# Patient Record
Sex: FEMALE | Race: WHITE | NOT HISPANIC OR LATINO | Employment: FULL TIME | URBAN - METROPOLITAN AREA
[De-identification: names, ages, dates, MRNs, and addresses within clinical notes are randomized per-mention and may not be internally consistent; named-entity substitution may affect disease eponyms.]

---

## 2017-04-03 RX ORDER — TAMOXIFEN CITRATE 20 MG/1
TABLET ORAL
Qty: 30 TABLET | Refills: 0 | OUTPATIENT
Start: 2017-04-03 | End: 2017-05-09 | Stop reason: SDUPTHER

## 2017-04-03 NOTE — TELEPHONE ENCOUNTER
----- Message from Arlet Smith sent at 4/3/2017  9:48 AM EDT -----  Contact: Mamta Johns [phone]   Want to clarify tamoxiphen.      Maryse with [facility] called wanting a refill on pt's tamoxifen. She has sent the request a few times. Since pt will see Dr. Lyon at the end of the month, OK'd a 30 day supply. She v/u.

## 2017-04-28 ENCOUNTER — OFFICE VISIT (OUTPATIENT)
Dept: ONCOLOGY | Facility: CLINIC | Age: 42
End: 2017-04-28

## 2017-04-28 ENCOUNTER — LAB (OUTPATIENT)
Dept: LAB | Facility: HOSPITAL | Age: 42
End: 2017-04-28

## 2017-04-28 VITALS
RESPIRATION RATE: 18 BRPM | HEART RATE: 116 BPM | OXYGEN SATURATION: 97 % | SYSTOLIC BLOOD PRESSURE: 140 MMHG | TEMPERATURE: 98.3 F | HEIGHT: 65 IN | BODY MASS INDEX: 47.32 KG/M2 | WEIGHT: 284 LBS | DIASTOLIC BLOOD PRESSURE: 90 MMHG

## 2017-04-28 DIAGNOSIS — I89.0 LYMPHEDEMA OF UPPER EXTREMITY: ICD-10-CM

## 2017-04-28 DIAGNOSIS — E55.9 VITAMIN D DEFICIENCY: ICD-10-CM

## 2017-04-28 DIAGNOSIS — D50.8 OTHER IRON DEFICIENCY ANEMIA: ICD-10-CM

## 2017-04-28 DIAGNOSIS — C50.812 MALIGNANT NEOPLASM OF OVERLAPPING SITES OF LEFT FEMALE BREAST (HCC): ICD-10-CM

## 2017-04-28 DIAGNOSIS — D51.8 OTHER VITAMIN B12 DEFICIENCY ANEMIA: ICD-10-CM

## 2017-04-28 DIAGNOSIS — M25.50 ARTHRALGIA OF MULTIPLE SITES: ICD-10-CM

## 2017-04-28 DIAGNOSIS — D51.8 OTHER VITAMIN B12 DEFICIENCY ANEMIA: Primary | ICD-10-CM

## 2017-04-28 DIAGNOSIS — D50.0 ANEMIA DUE TO BLOOD LOSS: ICD-10-CM

## 2017-04-28 DIAGNOSIS — F32.89 OTHER DEPRESSION: ICD-10-CM

## 2017-04-28 PROBLEM — R53.82 CHRONIC FATIGUE: Status: ACTIVE | Noted: 2017-04-28

## 2017-04-28 LAB
25(OH)D3 SERPL-MCNC: 29.8 NG/ML (ref 30–100)
ALBUMIN SERPL-MCNC: 4.2 G/DL (ref 3.5–5.2)
ALBUMIN/GLOB SERPL: 1.4 G/DL (ref 1.1–2.4)
ALP SERPL-CCNC: 62 U/L (ref 38–116)
ALT SERPL W P-5'-P-CCNC: 39 U/L (ref 0–33)
ANION GAP SERPL CALCULATED.3IONS-SCNC: 17.4 MMOL/L
AST SERPL-CCNC: 47 U/L (ref 0–32)
BASOPHILS # BLD AUTO: 0.05 10*3/MM3 (ref 0–0.1)
BASOPHILS NFR BLD AUTO: 0.5 % (ref 0–1.1)
BILIRUB SERPL-MCNC: <0.2 MG/DL (ref 0.1–1.2)
BUN BLD-MCNC: 10 MG/DL (ref 6–20)
BUN/CREAT SERPL: 16.4 (ref 7.3–30)
CALCIUM SPEC-SCNC: 9.2 MG/DL (ref 8.5–10.2)
CHLORIDE SERPL-SCNC: 100 MMOL/L (ref 98–107)
CHROMATIN AB SERPL-ACNC: 16.4 IU/ML (ref 0–14)
CO2 SERPL-SCNC: 24.6 MMOL/L (ref 22–29)
CREAT BLD-MCNC: 0.61 MG/DL (ref 0.6–1.1)
CRP SERPL-MCNC: 0.48 MG/DL (ref 0–0.5)
DEPRECATED RDW RBC AUTO: 46.1 FL (ref 37–49)
EOSINOPHIL # BLD AUTO: 0.99 10*3/MM3 (ref 0–0.36)
EOSINOPHIL NFR BLD AUTO: 9.6 % (ref 1–5)
ERYTHROCYTE [DISTWIDTH] IN BLOOD BY AUTOMATED COUNT: 13.6 % (ref 11.7–14.5)
ERYTHROCYTE [SEDIMENTATION RATE] IN BLOOD: 16 MM/HR (ref 0–20)
FERRITIN SERPL-MCNC: 42.3 NG/ML (ref 11–207)
GFR SERPL CREATININE-BSD FRML MDRD: 107 ML/MIN/1.73
GLOBULIN UR ELPH-MCNC: 3 GM/DL (ref 1.8–3.5)
GLUCOSE BLD-MCNC: 135 MG/DL (ref 74–124)
HCT VFR BLD AUTO: 35.4 % (ref 34–45)
HGB BLD-MCNC: 11.4 G/DL (ref 11.5–14.9)
IMM GRANULOCYTES # BLD: 0.07 10*3/MM3 (ref 0–0.03)
IMM GRANULOCYTES NFR BLD: 0.7 % (ref 0–0.5)
IRON 24H UR-MRATE: 36 MCG/DL (ref 37–145)
IRON SATN MFR SERPL: 7 % (ref 14–48)
LYMPHOCYTES # BLD AUTO: 2.2 10*3/MM3 (ref 1–3.5)
LYMPHOCYTES NFR BLD AUTO: 21.4 % (ref 20–49)
MCH RBC QN AUTO: 29.7 PG (ref 27–33)
MCHC RBC AUTO-ENTMCNC: 32.2 G/DL (ref 32–35)
MCV RBC AUTO: 92.2 FL (ref 83–97)
MONOCYTES # BLD AUTO: 0.41 10*3/MM3 (ref 0.25–0.8)
MONOCYTES NFR BLD AUTO: 4 % (ref 4–12)
NEUTROPHILS # BLD AUTO: 6.57 10*3/MM3 (ref 1.5–7)
NEUTROPHILS NFR BLD AUTO: 63.8 % (ref 39–75)
NRBC BLD MANUAL-RTO: 0 /100 WBC (ref 0–0)
PLATELET # BLD AUTO: 427 10*3/MM3 (ref 150–375)
PMV BLD AUTO: 9.3 FL (ref 8.9–12.1)
POTASSIUM BLD-SCNC: 3.7 MMOL/L (ref 3.5–4.7)
PROT SERPL-MCNC: 7.2 G/DL (ref 6.3–8)
RBC # BLD AUTO: 3.84 10*6/MM3 (ref 3.9–5)
SODIUM BLD-SCNC: 142 MMOL/L (ref 134–145)
TIBC SERPL-MCNC: 491 MCG/DL (ref 249–505)
TRANSFERRIN SERPL-MCNC: 351 MG/DL (ref 200–360)
VIT B12 BLD-MCNC: 370 PG/ML (ref 250–999)
WBC NRBC COR # BLD: 10.29 10*3/MM3 (ref 4–10)

## 2017-04-28 PROCEDURE — 99214 OFFICE O/P EST MOD 30 MIN: CPT | Performed by: INTERNAL MEDICINE

## 2017-04-28 PROCEDURE — 80053 COMPREHEN METABOLIC PANEL: CPT

## 2017-04-28 PROCEDURE — 86431 RHEUMATOID FACTOR QUANT: CPT | Performed by: INTERNAL MEDICINE

## 2017-04-28 PROCEDURE — 85652 RBC SED RATE AUTOMATED: CPT | Performed by: INTERNAL MEDICINE

## 2017-04-28 PROCEDURE — 83540 ASSAY OF IRON: CPT

## 2017-04-28 PROCEDURE — 84466 ASSAY OF TRANSFERRIN: CPT

## 2017-04-28 PROCEDURE — 86140 C-REACTIVE PROTEIN: CPT | Performed by: INTERNAL MEDICINE

## 2017-04-28 PROCEDURE — 36415 COLL VENOUS BLD VENIPUNCTURE: CPT

## 2017-04-28 PROCEDURE — 85025 COMPLETE CBC W/AUTO DIFF WBC: CPT

## 2017-04-28 PROCEDURE — 82728 ASSAY OF FERRITIN: CPT

## 2017-04-28 PROCEDURE — 82306 VITAMIN D 25 HYDROXY: CPT | Performed by: INTERNAL MEDICINE

## 2017-04-28 PROCEDURE — 82607 VITAMIN B-12: CPT

## 2017-04-28 RX ORDER — NEBIVOLOL HYDROCHLORIDE 10 MG/1
TABLET ORAL
COMMUNITY
Start: 2017-03-03 | End: 2017-11-08

## 2017-04-28 RX ORDER — METOPROLOL SUCCINATE 50 MG/1
50 TABLET, EXTENDED RELEASE ORAL NIGHTLY
COMMUNITY
Start: 2017-04-12 | End: 2020-06-26 | Stop reason: SDDI

## 2017-04-28 RX ORDER — LOSARTAN POTASSIUM AND HYDROCHLOROTHIAZIDE 25; 100 MG/1; MG/1
1 TABLET ORAL NIGHTLY
COMMUNITY
Start: 2017-04-25 | End: 2020-06-26 | Stop reason: SDDI

## 2017-04-28 RX ORDER — FLUTICASONE PROPIONATE 50 MCG
1 SPRAY, SUSPENSION (ML) NASAL AS NEEDED
COMMUNITY
Start: 2017-04-12 | End: 2019-10-25

## 2017-04-28 RX ORDER — MELOXICAM 15 MG/1
15 TABLET ORAL DAILY
COMMUNITY
Start: 2017-04-12 | End: 2018-06-04

## 2017-04-28 NOTE — PROGRESS NOTES
Subjective .     REASONS FOR FOLLOWUP:    1. Stage IIA ([id number]) left breast cancer:  Status post left mastectomy with left axillary lymph node dissection 03/12/2013.  Lesion #1 invasive ductal carcinoma grade 2, 1.5 cm ER positive (90%), DC positive (1-4%), HER-2/jodi FISH negative (ratio 1.4),  Ki-67 score of 8%.  Lesion #2 invasive ductal carcinoma, grade 2, 1.7 cm,  ER positive (70%), DC negative (%),HER-2/jodi FISH positive (ratio of 3.0).  3 of 15 lymph nodes positive (2 macrometastases, 1 micrometastasis, largest lesion 2.5 cm replacing lymph node).  Lymph node evaluated and is ER positive (90%), DC positive (30%), HER-2/jodi FISH negative (ratio of 1.4).    2. Iron deficiency anemia presumed secondary to menstrual blood loss.  Previously treated with iron sulfate 325 mg daily.  Iron discontinued secondary to GI side effects (diarrhea).  3. BRCA-1 and 2 testing negative.    4. Participation in the [employer] Clinical trial.   5. Status post Mediport placement by Dr. Neumann 04/08/2013.  Mediport removed by Dr. Mabry 07/21/2014.  6. Initiation of adjuvant chemotherapy 04/16/2013.  Treatment planned:  dose-dense Adriamycin and Cytoxan with Neulasta support q.2 weeks x4 cycles.  Subsequent weekly Taxol and Herceptin x12 weeks.  Subsequent continuation of Herceptin q.3 weeks to complete one year.  Adjuvant endocrine therapy after completion of chemotherapy with tamoxifen (premenopausal).  Plan for adjuvant radiation therapy following completion of chemotherapy.  Last dose of Taxol administered 08/27/2013. Tamoxifen initiated 09/25/2013 (plan to treat for 10 years).   Final dose of Herceptin administered 05/21/2014 (one year of treatment completed 06/11/2014).   7. 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  8. Borderline elevated transaminases. CT abdomen 10/30/2013 with normal appearing liver. Suspected steatohepatitis.   9. Left foot plantar fasciitis with stress fracture of  the 4th metatarsal, status post evaluation with bone scan, 09/29/2014 and MRI of the left foot, 10/08/2014. Negative for metastatic disease.    10. Lymphedema of the left upper extremity with compression sleeve in place and having been evaluated in the lymphedema clinic.   11. Left wrist tenosynovitis identified on MRI of 06/18/2015. Status post steroid injection by orthopedics, on 07/18/2015.   12. Anemia with hemoglobin decreased to 10.8 on 3/30/16.  Evaluation revealing evidence of iron deficiency (related to menstrual blood loss) and B12 deficiency.  Initiated oral iron sulfate 325 mg twice a day as well as B12 replacement with weekly injections ×4 and subsequent monthly injections with transition to oral B12 1000 µg daily.   13. Vitamin D deficiency continuing on 2000 unit per day replacement     HISTORY OF PRESENT ILLNESS:  The patient is a 41 y.o. year old female who is here for follow-up with the above-mentioned history.    History of Present Illness   the patient returns today in follow-up continuing on adjuvant tamoxifen.  She continues to tolerate this very well.  She is not experiencing any significant hot flashes.  She had previous difficulty with dysfunctional uterine bleeding.  She did undergo a endometrial biopsy 1/18/16 that was negative.  She had fairly profuse bleeding that occurred in early March 2016.  Eventually this did cease.  She did undergo a DandC on 4/13/16 with no evidence of malignancy on the pathology.       The patient returns in follow-up today with a number of complaints.  I had asked her to remain on oral iron twice daily as well as oral B12 at her last visit however she has not been receiving either of these.  She is receiving calcium and vitamin D.  She does have quite a technically schedule working around an hour away from her home and caring for her children.  She is fatigued and notes that this has increased over the past 4 months or so.  She has had worsening difficulty with  arthralgias involving her hands right greater than left.  She has pain involving her entire body.  She has been evaluated by her primary care nurse practitioner in reports that an MARYSE panel was performed that was negative.  She felt that she may have carpal tunnel syndrome and she has been referred to orthopedics for evaluation.  It is also felt that she may have fibromyalgia.  She had continued without evidence of any menstrual blood loss until a few weeks ago when she had a very heavy menstrual cycle and she has been more fatigued since that time.  She does note some diarrhea intermittently recently denies any bright blood per rectum or melena.  She has tried to pursue weight loss however has been unsuccessful.  She did take a brief course of Saxenda but was taken off with no effects.  She did follow-up with her gynecologist in January and they had discussed the potential hysterectomy however as it would be performed vaginally with laparoscopic assistance, it was recommended for her to first lose weight.  She is also wanting to pursue at some point reconstruction of the left breast however plastic surgery has requested that she lose weight first as well.  She reports that from a psychiatric standpoint she has been doing well.    PAST MEDICAL HISTORY:    1. Hypertension.  2. History of menorrhagia status post [facility] with Dr. Elda Roland 16.  Pathology showed polypoid endometrium with disordered proliferation but no evidence of malignancy.  Menstrual blood loss ceased.  3. History of phentermine use for weight loss.  4. Status post knee surgery after traumatic fracture with hardware in place in .  5. Status post  x2.  6. Status post Mediport placement by Dr. Neumann on 2013. Mediport removed by Dr. Mabry 2014.  7. Left foot plantar fasciitis with stress fracture of the left 4th metatarsal, 2014.   8. Osteopenia.   9. Borderline vitamin D deficiency.   10. Anxiety/depression.   Hospitalization required in 10/2015 due to major depressive disorder with psychotic features.   11. Anemia secondary to iron deficiency (menstrual blood loss) and B12 deficiency.    OB-GYN HISTORY:   G3, P3, A0, menarche age 10.  The patient has had regular menstrual cycles, however, has some degree of menorrhagia.  She was on oral contraceptives for 3-4 years in her late teens to early 20s. Menstrual cycles ceased with the 1st cycle of chemotherapy.     Menstrual cycles resumed in July 2015. Endometrial biopsy performed due to endometrial thickening in October 2015 and again 1/18/16 with benign pathology.  Recurrent severe dysfunctional uterine bleeding in early March 2016 causing anemia.  D&C 4/13/16-  pathology showed polypoid endometrium with disordered proliferation but no evidence of malignancy.     ONCOLOGIC HISTORY:  (History from previous dates can be found in the separate document.)    Current Outpatient Prescriptions on File Prior to Visit   Medication Sig Dispense Refill   • cholecalciferol (VITAMIN D3) 1000 UNITS tablet Take 1,000 Units by mouth daily.     • escitalopram (LEXAPRO) 20 MG tablet Take 20 mg by mouth daily.     • ferrous sulfate 325 (65 FE) MG tablet Take 325 mg by mouth 2 (two) times a day.     • LISINOPRIL PO Take 20 mg by mouth Daily.     • Loratadine 10 MG capsule Take 10 mg by mouth Daily.     • LORazepam (ATIVAN) 1 MG tablet Take 1 mg by mouth 3 (three) times a day.     • tamoxifen (NOLVADEX) 20 MG chemo tablet TAKE ONE TABLET BY MOUTH DAILY 30 tablet 0   • [DISCONTINUED] azithromycin (ZITHROMAX) 250 MG tablet Take 2 tablets the first day, then 1 tablet daily for 4 days. 6 tablet 0   • [DISCONTINUED] Cyanocobalamin (VITAMIN B-12 IJ) Inject  as directed 1 (one) time per week.     • [DISCONTINUED] verapamil SR (CALAN-SR) 240 MG CR tablet Take 240 mg by mouth every morning.       Current Facility-Administered Medications on File Prior to Visit   Medication Dose Route Frequency Provider  Last Rate Last Dose   • cyanocobalamin injection 1,000 mcg  1,000 mcg Intramuscular Once Glenn Lyon Jr., MD           ALLERGIES:     Allergies   Allergen Reactions   • Adhesive Tape    • Shellfish-Derived Products GI Intolerance   • Shrimp Flavor GI Intolerance       SOCIAL HISTORY:  The patient is  and lives with her  in [place], [place].  She works as a teacher.  She denies history of smoking, denies significant alcohol use.      FAMILY HISTORY:  Maternal grandmother had breast cancer in her 70s; maternal grandmother’s sister had ovarian cancer in her 70s, and another sister had non-Hodgkin's lymphoma in her 70s.  Maternal grandfather had prostate cancer late in life, and his sister had ovarian cancer later in life.  On paternal side there is a history of diabetes mellitus.  A sister has had issues with anemia and easy bruising.  The patient was seen in [facility] and underwent BRCA-1 and 2 testing prior to surgery which returned negative.          Review of Systems   Constitutional: Positive for fatigue. Negative for activity change, appetite change, fever and unexpected weight change.   HENT: Negative for congestion, mouth sores, nosebleeds and voice change.    Respiratory: Negative for cough and shortness of breath.    Cardiovascular: Negative for chest pain, palpitations and leg swelling.   Gastrointestinal: Negative for abdominal distention, abdominal pain, blood in stool, constipation, diarrhea, nausea and vomiting.   Endocrine: Negative for cold intolerance and heat intolerance.   Genitourinary: Positive for menstrual problem. Negative for difficulty urinating, dysuria, frequency and hematuria.   Musculoskeletal: Positive for arthralgias. Negative for back pain, joint swelling and myalgias.   Skin: Negative for rash.   Neurological: Negative for dizziness, syncope, weakness, light-headedness, numbness and headaches.   Hematological: Negative for adenopathy. Does not bruise/bleed easily.    Psychiatric/Behavioral: Negative for confusion, dysphoric mood and sleep disturbance. The patient is not nervous/anxious.          Objective      Vitals:    04/28/17 1506   BP: 140/90   Pulse: 116   Resp: 18   Temp: 98.3 °F (36.8 °C)   SpO2: 97%      Current Status 4/28/2017   ECOG score 0   Pain 5/10    Physical Exam   Constitutional: She is oriented to person, place, and time. She appears well-developed and well-nourished.   HENT:   Mouth/Throat: Oropharynx is clear and moist.   Eyes: Conjunctivae are normal.   Neck: No thyromegaly present.   Cardiovascular: Normal rate and regular rhythm.  Exam reveals no gallop and no friction rub.    No murmur heard.  Pulmonary/Chest: Effort normal and breath sounds normal. No respiratory distress. She has no wheezes. Right breast exhibits no mass. Left breast exhibits no mass (status post left mastectomy with postoperative/post radiation changes noted.).   Abdominal: Soft. Bowel sounds are normal. She exhibits no distension. There is no tenderness.   Musculoskeletal: She exhibits no edema.   Lymphadenopathy:        Head (right side): No submandibular adenopathy present.     She has no cervical adenopathy.     She has no axillary adenopathy.        Right: No inguinal and no supraclavicular adenopathy present.        Left: No inguinal and no supraclavicular adenopathy present.   Neurological: She is alert and oriented to person, place, and time. She displays normal reflexes. No cranial nerve deficit. She exhibits normal muscle tone.   Skin: Skin is warm and dry. No rash noted.   Psychiatric: She has a normal mood and affect. Her behavior is normal.       RECENT LABS:  Hematology WBC   Date Value Ref Range Status   04/28/2017 10.29 (H) 4.00 - 10.00 10*3/mm3 Final     RBC   Date Value Ref Range Status   04/28/2017 3.84 (L) 3.90 - 5.00 10*6/mm3 Final     Hemoglobin   Date Value Ref Range Status   04/28/2017 11.4 (L) 11.5 - 14.9 g/dL Final     Hematocrit   Date Value Ref Range  Status   04/28/2017 35.4 34.0 - 45.0 % Final     Platelets   Date Value Ref Range Status   04/28/2017 427 (H) 150 - 375 10*3/mm3 Final        Lab Results   Component Value Date    GLUCOSE 135 (H) 04/28/2017    BUN 10 04/28/2017    CREATININE 0.61 04/28/2017    EGFRIFNONA 107 04/28/2017    BCR 16.4 04/28/2017    CO2 24.6 04/28/2017    CALCIUM 9.2 04/28/2017    ALBUMIN 4.20 04/28/2017    LABIL2 1.4 04/28/2017    AST 47 (H) 04/28/2017    ALT 39 (H) 04/28/2017     Additional labs today: Iron 36, ferritin 42, iron saturation 7%, TIBC 491, B12 370      Assessment/Plan     1. Stage IIA ([id number]) left breast cancer:   The patient had 2 primary lesions of the left breast, the first ER positive, SD borderline, HER-2/jodi negative. The second was ER positive, SD negative, HER-2/jodi positive. She had 3 of 15 lymph nodes positive. She received adjuvant dose-dense chemotherapy with AC x4 cycles followed by weekly Taxol x12 doses completed on 08/27/2013. She did receive Herceptin in conjunction with the Taxol and has continued on Herceptin now every 3 weeks to complete a total of a year.  One year of Herceptin has now been completed. Her last dose of Herceptin was received on 05/21/2014 (3-week dose). She did initiate adjuvant tamoxifen on 09/25/2013 and we anticipate likely a 10-year course of treatment.     The patient continues with no clinical evidence of recurrent disease.  I believe that her musculoskeletal complaints are unrelated to her history of breast cancer at this point.  Her exam today was negative.  She did undergo her annual mammogram on 10/13/16 which was negative, BI-RADS 1.  She will continue on adjuvant tamoxifen and I will see her back in 3 months for follow-up visit.    2. Depression/anxiety with prior psychotic episode: The patient required hospitalization in the behavioral health unit in October 2015. She has had difficulties with anxiety and depression in the past, however, given her breast cancer  diagnosis as well as stressors from her job and family, it appears that these precipitated the event.  She has stabilized on Lexapro at 20 mg per day and does have routine follow-up scheduled with Dr. Camp.   3. Left wrist tenosynovitis: This improved.  4. Diffuse arthralgias and fatigue: The patient is experiencing diffuse arthralgias as well as pain in her wrists right greater than left.  It is unclear whether she has some recurrence of tenosynovitis on the right side.  She reports that her primary care nurse practitioner performed a serologic evaluation that was negative however we will go ahead and check a rheumatoid factor and an MARYSE panel along with sedimentation rate and CRP today.  There is been some suspicion for carpal tunnel syndrome on the right side and she has been referred to orthopedics.  There is also been some suspicion for fibromyalgia and I share that concern. Fibromyalgia seems to be the most likely cause of her current generalized symptoms.  5. Vitamin D deficiency: The patient went off her vitamin D supplement and developed recurrent vitamin D deficiency.  She had been receiving 1000 units per day with a low level on 5/27/16 of 25.2.  She subsequently increased her dose to 2000 units daily and her level improved to 34.1.  She reports compliance with her vitamin D supplementation and a level is pending today.  6. Hot flashes secondary to tamoxifen: The patient is not requiring any pharmacologic therapy for this.  Her symptoms are tolerable.   7. Endometrial thickening with dysfunctional uterine bleeding on tamoxifen: The patient underwent endometrial biopsy in October 2015 and again in 1/18/16 with negative findings.  She then experienced a significant degree of menstrual blood loss in early March 2016 to the point where she became anemic.  She did undergo a DandC with Dr. Roland on 4/13/16.  Pathology showed a polypoid endometrium with disordered proliferation but no evidence of  malignancy.  She had been without a menstrual cycle for some time until a few weeks ago when she had a severe episode of blood loss.  She did see her gynecologist in January and they had discussed a possible laparoscopic assisted vaginal hysterectomy once she loses some more weight.   8. Anemia: The patient developed significant anemia with a hemoglobin down to 10.8 on 3/30/16.  Evaluation revealed iron deficiency with a ferritin of 12.5 iron percent saturation of 5 and a TIBC 535.  She started oral iron sulfate 325 mg twice a day and was tolerating this very well.  Her hemoglobin improved into the 12 range.  Her iron studies showed a significant improvement previously in October 2016 with ferritin up to 54, iron saturation 16%.  She was asked to continue on oral iron twice a day however she did not do so.  The patient also was found to have B12 deficiency with a level of 187 on 3/30/16.  She started B12 replacement and received weekly dosing ×4 followed bimonthly injections, last received 7/22/16.  As it was difficult for her to come in to the office for B12 injections, she was asked to begin oral B12 1000 µg daily however she did not do this.  Now, she is mildly anemic with a hemoglobin of 11.4.  She has a thrombocytosis as well, possibly related to iron deficiency.  Repeat iron studies today show a ferritin that his decline to 42, iron saturation low at 7%, elevated TIBC at 491.  I have asked her to resume oral iron twice daily and we will recheck iron studies in 3 months.  She maintains a low normal B12 level of 370 off replacement and we will monitor this again in 3 months.  9. Elevated transaminases: The patient has experienced this in the past with negative hepatic imaging.  She has very mild elevation today and we will recheck this in 3 months.  If her levels become more significantly elevated, we will consider additional imaging evaluation.  I suspect that she has underlying steatohepatitis.    PLAN:      1. Continue tamoxifen 20 mg daily.   2. Continue vitamin D 2000 units daily  3. Laboratory studies pending today with ESR, CRP, 25 hydroxy vitamin D level, rheumatoid factor, MARYSE panel.  4. Resume oral iron sulfate 325 mg twice a day  5. Evaluation by orthopedic surgeon in [place] as scheduled for possible right wrist carpal tunnel syndrome  6. M.D. visit in 3 months with CBC, CMP, iron panel, ferritin, B12 level.

## 2017-05-01 LAB
CENTROMERE B AB SER-ACNC: <0.2 AI (ref 0–0.9)
CHROMATIN AB SERPL-ACNC: <0.2 AI (ref 0–0.9)
DSDNA AB SER-ACNC: 1 IU/ML (ref 0–9)
ENA JO1 AB SER-ACNC: <0.2 AI (ref 0–0.9)
ENA RNP AB SER-ACNC: <0.2 AI (ref 0–0.9)
ENA SCL70 AB SER-ACNC: 0.8 AI (ref 0–0.9)
ENA SM AB SER-ACNC: <0.2 AI (ref 0–0.9)
ENA SS-A AB SER-ACNC: <0.2 AI (ref 0–0.9)
ENA SS-B AB SER-ACNC: <0.2 AI (ref 0–0.9)
Lab: NORMAL

## 2017-05-04 ENCOUNTER — TELEPHONE (OUTPATIENT)
Dept: ONCOLOGY | Facility: CLINIC | Age: 42
End: 2017-05-04

## 2017-05-04 DIAGNOSIS — R76.8 ELEVATED RHEUMATOID FACTOR: Primary | ICD-10-CM

## 2017-05-04 RX ORDER — ERGOCALCIFEROL 1.25 MG/1
50000 CAPSULE ORAL WEEKLY
Qty: 8 CAPSULE | Refills: 0 | Status: SHIPPED | OUTPATIENT
Start: 2017-05-04 | End: 2020-07-31

## 2017-05-09 RX ORDER — TAMOXIFEN CITRATE 20 MG/1
TABLET ORAL
Qty: 30 TABLET | Refills: 0 | Status: SHIPPED | OUTPATIENT
Start: 2017-05-09 | End: 2017-06-16 | Stop reason: SDUPTHER

## 2017-05-18 DIAGNOSIS — C50.812 MALIGNANT NEOPLASM OF OVERLAPPING SITES OF LEFT FEMALE BREAST (HCC): Primary | ICD-10-CM

## 2017-06-16 RX ORDER — TAMOXIFEN CITRATE 20 MG/1
TABLET ORAL
Qty: 30 TABLET | Refills: 0 | Status: SHIPPED | OUTPATIENT
Start: 2017-06-16 | End: 2017-07-24 | Stop reason: SDUPTHER

## 2017-07-14 ENCOUNTER — TELEPHONE (OUTPATIENT)
Dept: ONCOLOGY | Facility: HOSPITAL | Age: 42
End: 2017-07-14

## 2017-07-14 ENCOUNTER — LAB (OUTPATIENT)
Dept: LAB | Facility: HOSPITAL | Age: 42
End: 2017-07-14

## 2017-07-14 ENCOUNTER — OFFICE VISIT (OUTPATIENT)
Dept: ONCOLOGY | Facility: CLINIC | Age: 42
End: 2017-07-14

## 2017-07-14 VITALS
HEART RATE: 92 BPM | TEMPERATURE: 98.2 F | HEIGHT: 65 IN | SYSTOLIC BLOOD PRESSURE: 122 MMHG | DIASTOLIC BLOOD PRESSURE: 82 MMHG | RESPIRATION RATE: 16 BRPM | WEIGHT: 293 LBS | BODY MASS INDEX: 48.82 KG/M2

## 2017-07-14 DIAGNOSIS — D50.0 ANEMIA DUE TO BLOOD LOSS: ICD-10-CM

## 2017-07-14 DIAGNOSIS — R53.82 CHRONIC FATIGUE: ICD-10-CM

## 2017-07-14 DIAGNOSIS — E55.9 VITAMIN D DEFICIENCY: Primary | ICD-10-CM

## 2017-07-14 DIAGNOSIS — D51.8 OTHER VITAMIN B12 DEFICIENCY ANEMIA: ICD-10-CM

## 2017-07-14 DIAGNOSIS — D50.8 OTHER IRON DEFICIENCY ANEMIA: ICD-10-CM

## 2017-07-14 DIAGNOSIS — C50.812 MALIGNANT NEOPLASM OF OVERLAPPING SITES OF LEFT FEMALE BREAST (HCC): ICD-10-CM

## 2017-07-14 DIAGNOSIS — E55.9 VITAMIN D DEFICIENCY: ICD-10-CM

## 2017-07-14 DIAGNOSIS — R76.8 ELEVATED RHEUMATOID FACTOR: ICD-10-CM

## 2017-07-14 DIAGNOSIS — I89.0 LYMPHEDEMA OF UPPER EXTREMITY: ICD-10-CM

## 2017-07-14 DIAGNOSIS — M25.50 ARTHRALGIA OF MULTIPLE SITES: ICD-10-CM

## 2017-07-14 LAB
25(OH)D3 SERPL-MCNC: 34.9 NG/ML (ref 30–100)
ALBUMIN SERPL-MCNC: 3.7 G/DL (ref 3.5–5.2)
ALBUMIN/GLOB SERPL: 1.2 G/DL (ref 1.1–2.4)
ALP SERPL-CCNC: 61 U/L (ref 38–116)
ALT SERPL W P-5'-P-CCNC: 28 U/L (ref 0–33)
ANION GAP SERPL CALCULATED.3IONS-SCNC: 12.1 MMOL/L
AST SERPL-CCNC: 26 U/L (ref 0–32)
BASOPHILS # BLD AUTO: 0.05 10*3/MM3 (ref 0–0.1)
BASOPHILS NFR BLD AUTO: 0.6 % (ref 0–1.1)
BILIRUB SERPL-MCNC: <0.2 MG/DL (ref 0.1–1.2)
BUN BLD-MCNC: 12 MG/DL (ref 6–20)
BUN/CREAT SERPL: 21.1 (ref 7.3–30)
CALCIUM SPEC-SCNC: 8.9 MG/DL (ref 8.5–10.2)
CHLORIDE SERPL-SCNC: 100 MMOL/L (ref 98–107)
CO2 SERPL-SCNC: 26.9 MMOL/L (ref 22–29)
CREAT BLD-MCNC: 0.57 MG/DL (ref 0.6–1.1)
DEPRECATED RDW RBC AUTO: 42.1 FL (ref 37–49)
EOSINOPHIL # BLD AUTO: 0.51 10*3/MM3 (ref 0–0.36)
EOSINOPHIL NFR BLD AUTO: 5.6 % (ref 1–5)
ERYTHROCYTE [DISTWIDTH] IN BLOOD BY AUTOMATED COUNT: 12.6 % (ref 11.7–14.5)
FERRITIN SERPL-MCNC: 46.7 NG/ML (ref 11–207)
GFR SERPL CREATININE-BSD FRML MDRD: 116 ML/MIN/1.73
GLOBULIN UR ELPH-MCNC: 3.1 GM/DL (ref 1.8–3.5)
GLUCOSE BLD-MCNC: 108 MG/DL (ref 74–124)
HCT VFR BLD AUTO: 37.8 % (ref 34–45)
HGB BLD-MCNC: 12.2 G/DL (ref 11.5–14.9)
IMM GRANULOCYTES # BLD: 0.06 10*3/MM3 (ref 0–0.03)
IMM GRANULOCYTES NFR BLD: 0.7 % (ref 0–0.5)
IRON 24H UR-MRATE: 54 MCG/DL (ref 37–145)
IRON SATN MFR SERPL: 12 % (ref 14–48)
LYMPHOCYTES # BLD AUTO: 1.82 10*3/MM3 (ref 1–3.5)
LYMPHOCYTES NFR BLD AUTO: 20.1 % (ref 20–49)
MCH RBC QN AUTO: 29.6 PG (ref 27–33)
MCHC RBC AUTO-ENTMCNC: 32.3 G/DL (ref 32–35)
MCV RBC AUTO: 91.7 FL (ref 83–97)
MONOCYTES # BLD AUTO: 0.62 10*3/MM3 (ref 0.25–0.8)
MONOCYTES NFR BLD AUTO: 6.9 % (ref 4–12)
NEUTROPHILS # BLD AUTO: 5.99 10*3/MM3 (ref 1.5–7)
NEUTROPHILS NFR BLD AUTO: 66.1 % (ref 39–75)
NRBC BLD MANUAL-RTO: 0 /100 WBC (ref 0–0)
PLATELET # BLD AUTO: 312 10*3/MM3 (ref 150–375)
PMV BLD AUTO: 9.6 FL (ref 8.9–12.1)
POTASSIUM BLD-SCNC: 4.1 MMOL/L (ref 3.5–4.7)
PROT SERPL-MCNC: 6.8 G/DL (ref 6.3–8)
RBC # BLD AUTO: 4.12 10*6/MM3 (ref 3.9–5)
SODIUM BLD-SCNC: 139 MMOL/L (ref 134–145)
TIBC SERPL-MCNC: 448 MCG/DL (ref 249–505)
TRANSFERRIN SERPL-MCNC: 320 MG/DL (ref 200–360)
VIT B12 BLD-MCNC: 299 PG/ML (ref 250–999)
WBC NRBC COR # BLD: 9.05 10*3/MM3 (ref 4–10)

## 2017-07-14 PROCEDURE — 85025 COMPLETE CBC W/AUTO DIFF WBC: CPT

## 2017-07-14 PROCEDURE — 84466 ASSAY OF TRANSFERRIN: CPT

## 2017-07-14 PROCEDURE — 36415 COLL VENOUS BLD VENIPUNCTURE: CPT | Performed by: INTERNAL MEDICINE

## 2017-07-14 PROCEDURE — 82306 VITAMIN D 25 HYDROXY: CPT | Performed by: INTERNAL MEDICINE

## 2017-07-14 PROCEDURE — 82607 VITAMIN B-12: CPT

## 2017-07-14 PROCEDURE — 82728 ASSAY OF FERRITIN: CPT

## 2017-07-14 PROCEDURE — 83540 ASSAY OF IRON: CPT

## 2017-07-14 PROCEDURE — 99215 OFFICE O/P EST HI 40 MIN: CPT | Performed by: INTERNAL MEDICINE

## 2017-07-14 PROCEDURE — 80053 COMPREHEN METABOLIC PANEL: CPT

## 2017-07-14 RX ORDER — HYDROXYCHLOROQUINE SULFATE 200 MG/1
200 TABLET, FILM COATED ORAL DAILY
COMMUNITY
Start: 2017-06-01 | End: 2020-08-07 | Stop reason: HOSPADM

## 2017-07-14 NOTE — TELEPHONE ENCOUNTER
----- Message from Glenn Lyon Jr., MD sent at 7/14/2017  4:36 PM EDT -----  Regarding: B12 replacement  Please contact patient and asked her to resume oral B12 1000 µg daily over-the-counter. Thanks!  ----- Message -----     From: Lab, Background User     Sent: 7/14/2017  12:08 PM       To: Glenn Lyon Jr., MD

## 2017-07-14 NOTE — PROGRESS NOTES
Subjective .     REASONS FOR FOLLOWUP:    1. Stage IIA ([id number]) left breast cancer:  Status post left mastectomy with left axillary lymph node dissection 03/12/2013.  Lesion #1 invasive ductal carcinoma grade 2, 1.5 cm ER positive (90%), GA positive (1-4%), HER-2/jodi FISH negative (ratio 1.4),  Ki-67 score of 8%.  Lesion #2 invasive ductal carcinoma, grade 2, 1.7 cm,  ER positive (70%), GA negative (%),HER-2/jodi FISH positive (ratio of 3.0).  3 of 15 lymph nodes positive (2 macrometastases, 1 micrometastasis, largest lesion 2.5 cm replacing lymph node).  Lymph node evaluated and is ER positive (90%), GA positive (30%), HER-2/jodi FISH negative (ratio of 1.4).    2. Iron deficiency anemia presumed secondary to menstrual blood loss.  Previously treated with iron sulfate 325 mg daily.  Iron discontinued secondary to GI side effects (diarrhea).  3. BRCA-1 and 2 testing negative.    4. Participation in the [employer] Clinical trial.   5. Status post Mediport placement by Dr. Neumann 04/08/2013.  Mediport removed by Dr. Mabry 07/21/2014.  6. Initiation of adjuvant chemotherapy 04/16/2013.  Treatment planned:  dose-dense Adriamycin and Cytoxan with Neulasta support q.2 weeks x4 cycles.  Subsequent weekly Taxol and Herceptin x12 weeks.  Subsequent continuation of Herceptin q.3 weeks to complete one year.  Adjuvant endocrine therapy after completion of chemotherapy with tamoxifen (premenopausal).  Plan for adjuvant radiation therapy following completion of chemotherapy.  Last dose of Taxol administered 08/27/2013. Tamoxifen initiated 09/25/2013 (plan to treat for 10 years).   Final dose of Herceptin administered 05/21/2014 (one year of treatment completed 06/11/2014).   7. 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  8. Borderline elevated transaminases. CT abdomen 10/30/2013 with normal appearing liver. Suspected steatohepatitis.   9. Left foot plantar fasciitis with stress fracture of  the 4th metatarsal, status post evaluation with bone scan, 09/29/2014 and MRI of the left foot, 10/08/2014. Negative for metastatic disease.    10. Lymphedema of the left upper extremity with compression sleeve in place and having been evaluated in the lymphedema clinic.   11. Left wrist tenosynovitis identified on MRI of 06/18/2015. Status post steroid injection by orthopedics, on 07/18/2015.   12. Anemia with hemoglobin decreased to 10.8 on 3/30/16.  Evaluation revealing evidence of iron deficiency (related to menstrual blood loss) and B12 deficiency.  Initiated oral iron sulfate 325 mg twice a day as well as B12 replacement with weekly injections ×4 and subsequent monthly injections with intended transition to oral B12 1000 µg daily, however, patient did not follow through.  Patient also discontinued oral iron.  Recurrent iron deficiency with oral iron resumed 5/4/17 twice a day dosing.  13. Vitamin D deficiency currently receiving 50,000 units weekly.   14. Positive rheumatoid factor 4/28/17 (16.4), referred to rheumatology for further evaluation.  Initiated treatment with Plaquenil by rheumatology.    HISTORY OF PRESENT ILLNESS:  The patient is a 41 y.o. year old female who is here for follow-up with the above-mentioned history.    History of Present Illness   the patient returns today in follow-up continuing on adjuvant tamoxifen.  She continues to tolerate this very well.  She is not experiencing any significant hot flashes.  She had previous difficulty with dysfunctional uterine bleeding.  She did undergo a endometrial biopsy 1/18/16 that was negative.  She had fairly profuse bleeding that occurred in early March 2016.  Eventually this did cease.  She did undergo a DandC on 4/13/16 with no evidence of malignancy on the pathology.       At her visit on 4/28/17, the patient reported that she had not been taking oral B12 or iron.  She was receiving vitamin D supplementation at 2000 units daily.  Spite this, her  level was low at 29.8 and she was transitioned to vitamin D 50,000 units weekly for 8 weeks.  She is nearing completion of this regimen.  She also had an iron saturation that had declined to 7% and a ferritin down to 42 and we asked her to resume oral iron twice daily.  Her B12 level was in the low normal range at 370 and she has not resumed any oral B12.  She was experiencing diffuse myalgias and arthralgias with arthralgias especially involving the hands.  We did check an MARYSE panel which was negative and a rheumatoid factor that was positive at 16.4.  She was referred to rheumatology Dr. Lopez and was evaluated in early May.  I do not have her laboratory evaluation however she was felt to potentially have evidence of early rheumatoid arthritis.  She was started on Plaquenil.  She has tolerated this well.  She did see a hand surgeon who performed nerve conduction studies on her hands and arms and found that she had carpal tunnel syndrome and she underwent a carpal tunnel release surgery approximately a week and a half ago on the right side.  Her pain is improving postoperatively.  She has a brace on today.      Unfortunately, she has continued to gain weight, increasing up to 298 pounds.  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  This has relieved some of her stress.  She reports that the oral iron does cause some very occasional diarrhea however this is tolerable.  She does continue with some intermittent musculoskeletal pain noting pain usually in the morning time involving her shoulders.  This does improve through the day.  She notes slight increase in lymphedema symptoms in her left upper extremity in the hot weather.  She has been wearing her sleeve regularly.    PAST MEDICAL HISTORY:    1. Hypertension.  2. History of menorrhagia status post Dan[facility] with Dr. Elda Roland 4/13/16.  Pathology showed polypoid endometrium with disordered proliferation but no evidence of  malignancy.  Menstrual blood loss ceased.  3. History of phentermine use for weight loss.  4. Status post knee surgery after traumatic fracture with hardware in place in .  5. Status post  x2.  6. Status post Mediport placement by Dr. Neumann on 2013. Mediport removed by Dr. Mabry 2014.  7. Left foot plantar fasciitis with stress fracture of the left 4th metatarsal, 2014.   8. Osteopenia.   9. Vitamin D deficiency.   10. Anxiety/depression.  Hospitalization required in 10/2015 due to major depressive disorder with psychotic features.   11. Anemia secondary to iron deficiency (menstrual blood loss) and B12 deficiency.  12. Suspected early rheumatoid arthritis initiated Plaquenil under the direction of Dr. Lopez in rheumatology in May 2017.  Positive rheumatoid factor 16.4 on 17.  13. Status post right carpal tunnel release surgery in 2017    OB-GYN HISTORY:   G3, P3, A0, menarche age 10.  The patient has had regular menstrual cycles, however, has some degree of menorrhagia.  She was on oral contraceptives for 3-4 years in her late teens to early 20s. Menstrual cycles ceased with the 1st cycle of chemotherapy.     Menstrual cycles resumed in 2015. Endometrial biopsy performed due to endometrial thickening in 2015 and again 16 with benign pathology.  Recurrent severe dysfunctional uterine bleeding in early 2016 causing anemia.  D&C 16-  pathology showed polypoid endometrium with disordered proliferation but no evidence of malignancy.     ONCOLOGIC HISTORY:  (History from previous dates can be found in the separate document.)    Current Outpatient Prescriptions on File Prior to Visit   Medication Sig Dispense Refill   • BYSTOLIC 10 MG tablet      • cholecalciferol (VITAMIN D3) 1000 UNITS tablet Take 1,000 Units by mouth daily.     • escitalopram (LEXAPRO) 20 MG tablet Take 20 mg by mouth daily.     • ferrous sulfate 325 (65 FE) MG tablet Take 325  mg by mouth 2 (two) times a day.     • fluticasone (FLONASE) 50 MCG/ACT nasal spray      • LISINOPRIL PO Take 20 mg by mouth Daily.     • Loratadine 10 MG capsule Take 10 mg by mouth Daily.     • LORazepam (ATIVAN) 1 MG tablet Take 1 mg by mouth 3 (three) times a day.     • losartan-hydrochlorothiazide (HYZAAR) 100-25 MG per tablet      • meloxicam (MOBIC) 15 MG tablet      • metoprolol succinate XL (TOPROL-XL) 50 MG 24 hr tablet      • tamoxifen (NOLVADEX) 20 MG chemo tablet TAKE ONE TABLET BY MOUTH DAILY 30 tablet 0   • vitamin D (ERGOCALCIFEROL) 51569 UNITS capsule capsule Take 1 capsule by mouth 1 (One) Time Per Week. For 8 weeks 8 capsule 0     Current Facility-Administered Medications on File Prior to Visit   Medication Dose Route Frequency Provider Last Rate Last Dose   • cyanocobalamin injection 1,000 mcg  1,000 mcg Intramuscular Once Glenn Lyon Jr., MD           ALLERGIES:     Allergies   Allergen Reactions   • Adhesive Tape    • Shellfish-Derived Products GI Intolerance   • Shrimp Flavor GI Intolerance       SOCIAL HISTORY:  The patient is  and lives with her  in [place], [place].  She works as a teacher.  She denies history of smoking, denies significant alcohol use.      FAMILY HISTORY:  Maternal grandmother had breast cancer in her 70s; maternal grandmother’s sister had ovarian cancer in her 70s, and another sister had non-Hodgkin's lymphoma in her 70s.  Maternal grandfather had prostate cancer late in life, and his sister had ovarian cancer later in life.  On paternal side there is a history of diabetes mellitus.  A sister has had issues with anemia and easy bruising.  The patient was seen in Genetics Clinic and underwent BRCA-1 and 2 testing prior to surgery which returned negative.          Review of Systems   Constitutional: Positive for fatigue. Negative for activity change, appetite change, fever and unexpected weight change.   HENT: Negative for congestion, mouth sores, nosebleeds  and voice change.    Respiratory: Negative for cough and shortness of breath.    Cardiovascular: Negative for chest pain, palpitations and leg swelling.   Gastrointestinal: Negative for abdominal distention, abdominal pain, blood in stool, constipation, diarrhea, nausea and vomiting.   Endocrine: Negative for cold intolerance and heat intolerance.   Genitourinary: Positive for menstrual problem. Negative for difficulty urinating, dysuria, frequency and hematuria.   Musculoskeletal: Positive for arthralgias. Negative for back pain, joint swelling and myalgias.   Skin: Negative for rash.   Neurological: Negative for dizziness, syncope, weakness, light-headedness, numbness and headaches.   Hematological: Negative for adenopathy. Does not bruise/bleed easily.   Psychiatric/Behavioral: Negative for confusion, dysphoric mood and sleep disturbance. The patient is not nervous/anxious.          Objective      Vitals:    07/14/17 1210   BP: 122/82   Pulse: 92   Resp: 16   Temp: 98.2 °F (36.8 °C)      Current Status 7/14/2017   ECOG score 0   Pain 2/10    Physical Exam   Constitutional: She is oriented to person, place, and time. She appears well-developed and well-nourished.   HENT:   Mouth/Throat: Oropharynx is clear and moist.   Eyes: Conjunctivae are normal.   Neck: No thyromegaly present.   Cardiovascular: Normal rate and regular rhythm.  Exam reveals no gallop and no friction rub.    No murmur heard.  Pulmonary/Chest: Effort normal and breath sounds normal. No respiratory distress. She has no wheezes.   Abdominal: Soft. Bowel sounds are normal. She exhibits no distension. There is no tenderness.   Musculoskeletal:   Brace in place on the right wrist and forearm following recent surgery.  Lymphedema sleeve in place in the left upper extremity with no significant edema currently.  No lower extremity edema.   Lymphadenopathy:        Head (right side): No submandibular adenopathy present.     She has no cervical adenopathy.      She has no axillary adenopathy.        Right: No inguinal and no supraclavicular adenopathy present.        Left: No inguinal and no supraclavicular adenopathy present.   Neurological: She is alert and oriented to person, place, and time. She displays normal reflexes. No cranial nerve deficit. She exhibits normal muscle tone.   Skin: Skin is warm and dry. No rash noted.   Psychiatric: She has a normal mood and affect. Her behavior is normal.       RECENT LABS:  Hematology WBC   Date Value Ref Range Status   07/14/2017 9.05 4.00 - 10.00 10*3/mm3 Final     RBC   Date Value Ref Range Status   07/14/2017 4.12 3.90 - 5.00 10*6/mm3 Final     Hemoglobin   Date Value Ref Range Status   07/14/2017 12.2 11.5 - 14.9 g/dL Final     Hematocrit   Date Value Ref Range Status   07/14/2017 37.8 34.0 - 45.0 % Final     Platelets   Date Value Ref Range Status   07/14/2017 312 150 - 375 10*3/mm3 Final        Lab Results   Component Value Date    GLUCOSE 108 07/14/2017    BUN 12 07/14/2017    CREATININE 0.57 (L) 07/14/2017    EGFRIFNONA 116 07/14/2017    BCR 21.1 07/14/2017    CO2 26.9 07/14/2017    CALCIUM 8.9 07/14/2017    ALBUMIN 3.70 07/14/2017    LABIL2 1.2 07/14/2017    AST 26 07/14/2017    ALT 28 07/14/2017     Laboratory studies from 4/28/17: Iron 36, ferritin 42.3, iron saturation 7%, TIBC 491, he 12/3/70, CRP 0.48, ESR 16, 25-hydroxy vitamin D 29.8, MARYSE panel negative, rheumatoid factor 16.4    Additional labs today:Iron 54, ferritin 46.7, iron saturation 12%, TIBC 448, B12 299      Assessment/Plan     1. Stage IIA ([id number]) left breast cancer:   The patient had 2 primary lesions of the left breast, the first ER positive, [place] borderline, HER-2/jodi negative. The second was ER positive, [place] negative, HER-2/jodi positive. She had 3 of 15 lymph nodes positive. She received adjuvant dose-dense chemotherapy with AC x4 cycles followed by weekly Taxol x12 doses completed on 08/27/2013. She did receive Herceptin in conjunction  with the Taxol and has continued on Herceptin now every 3 weeks to complete a total of a year.  One year of Herceptin has now been completed. Her last dose of Herceptin was received on 05/21/2014 (3-week dose). She did initiate adjuvant tamoxifen on 09/25/2013 and we anticipate likely a 10-year course of treatment.     The patient continues with no clinical evidence of recurrent disease.  Her musculoskeletal complaints appear unrelated to her history of breast cancer.  Breast exam was last performed on 4/28/17 and was not performed today.  We will anticipate a follow-up exam when she returns here in 4 months.  She will be due for her annual right-sided mammogram in October 2017 and we will schedule that for her.  She will continue on adjuvant tamoxifen and I will see her back in 4 months for follow-up visit.  If she is doing well at that point we will lengthen out further follow-up to 6 months.  She did have a question regarding hereditary breast cancer today which did trigger a discussion and review of her prior evaluation in the genetics clinic in 2013.  By the records available it appears that she had only BRCA1 and 2 testing performed.  It is reasonable for us to refer her back to the genetics clinic to consider whether she requires extended panel testing which is now routinely performed.    2. Depression/anxiety with prior psychotic episode: The patient required hospitalization in the behavioral health unit in October 2015. She has had difficulties with anxiety and depression in the past, however, given her breast cancer diagnosis as well as stressors from her job and family, it appears that these precipitated the event.  She has stabilized on Lexapro at 20 mg per day and does have routine follow-up scheduled with Dr. Camp.   3. Left wrist tenosynovitis: This has improved.  4. Diffuse arthralgias and fatigue with suspected early rheumatoid arthritis: The patient was experiencing diffuse arthralgias as  well as pain in her wrists right greater than left.  We did perform an evaluation 4/28/17 with negative MARYSE panel however rheumatoid factor was positive at 16.4.  She was referred to rheumatology Dr. Lopez in May 2017.  More extensive evaluation was performed and we are requesting those results.  It appears that the patient was felt to potentially have early autoimmune disorder/rheumatoid arthritis.  She was started on Plaquenil and is tolerating this well.  We will obtain the full records from rheumatology.  5. Vitamin D deficiency: The patient went off her vitamin D supplement and developed recurrent vitamin D deficiency.  She had been receiving 1000 units per day with a low level on 5/27/16 of 25.2.  She subsequently increased her dose to 2000 units daily and her level improved to 34.1.  She reported compliance with her vitamin D supplementation and level on 4/28/17 was low at 29.8.  Therefore, she was transitioned to 50,000 units weekly for 8 weeks in early May 2017.  She will be completing that course of treatment fairly soon.  A repeat 25-hydroxy vitamin D level is requested today and is pending.  I have asked her to return to her 2000 unit a day dose when she completes the current 8 week course.  We will recheck a 25-hydroxy vitamin D level when she returns here in 4 months.  6. Hot flashes secondary to tamoxifen: The patient is not requiring any pharmacologic therapy for this.  Her symptoms are tolerable.   7. Endometrial thickening with dysfunctional uterine bleeding on tamoxifen: The patient underwent endometrial biopsy in October 2015 and again in 1/18/16 with negative findings.  She then experienced a significant degree of menstrual blood loss in early March 2016 to the point where she became anemic.  She did undergo a DandC with Dr. Roland on 4/13/16.  Pathology showed a polypoid endometrium with disordered proliferation but no evidence of malignancy.  She did see her gynecologist in January 2017 and  they had discussed a possible laparoscopic assisted vaginal hysterectomy once she loses some more weight.   8. Anemia: The patient developed significant anemia with a hemoglobin down to 10.8 on 3/30/16.  Evaluation revealed iron deficiency with a ferritin of 12.5 iron percent saturation of 5 and a TIBC 535.  She started oral iron sulfate 325 mg twice a day and was tolerating this very well.  Her hemoglobin improved into the 12 range.  Her iron studies showed a significant improvement previously in October 2016 with ferritin up to 54, iron saturation 16%.  She was asked to continue on oral iron twice a day however she did not do so.  The patient also was found to have B12 deficiency with a level of 187 on 3/30/16.  She started B12 replacement and received weekly dosing ×4 followed bimonthly injections, last received 7/22/16.  As it was difficult for her to come in to the office for B12 injections, she was asked to begin oral B12 1000 µg daily however she did not do this.  On 4/28/17, she was mildly anemic with a hemoglobin of 11.4.  She had a thrombocytosis as well, related to iron deficiency.  Repeat iron studies 4/28/17 showed a ferritin that declined to 42, iron saturation low at 7%, elevated TIBC at 491. She was asked to resume oral iron twice daily.  B12 level 4/28/17 had remained in the low normal range at 370 and was monitored.  Today, she returns with repeat laboratory studies indicating a slight improvement in her hemoglobin to 12.2, resolution of her thrombocytosis, increase in her iron saturation 12%, and slight increase in ferritin up to 46.7.  She is tolerating oral iron reasonably well with some intermittent diarrhea.  She was asked to continue on this dose and we will recheck studies in 4 months.  Her B12 level has declined to 299 and I will ask her to resume oral B-12 1000 µg per day.  We will recheck a level in 4 months.   9. Elevated transaminases: The patient has experienced this in the past with  negative hepatic imaging.  She had very mild elevation 4/28/17.  Currently, her liver function studies have normalized.  If her levels become more significantly elevated, we will consider additional imaging evaluation.  I suspect that she has underlying steatohepatitis.    PLAN:     1. Continue tamoxifen 20 mg daily.   2. Complete 8 week course of vitamin D at 50,000 units weekly and then transition back to 2000 units daily.  25-hydroxy vitamin D level pending today.  3. Continue oral iron sulfate 325 mg twice a day  4. Resume oral B12 1000 µg daily.  5. Obtain additional records including laboratory studies from Dr. Lopez in rheumatology.  6. Referral back to genetics clinic to discuss the need for expanded gene panel testing (previous evaluation appears to have included BRCA1 and 2 only).  7. Schedule annual right-sided mammogram in October 2017  8. M.D. visit in 4 months with CBC, CMP, iron panel, ferritin, B12 level, 25-hydroxy vitamin D level.    I did spend 40 minutes with the patient today, 85% of that time in face-to-face consultation and coordination of care.  We reviewed items outlined above under the assessment and plan

## 2017-07-14 NOTE — TELEPHONE ENCOUNTER
Notified patient and she v/u      ----- Message from Glenn Lyon Jr., MD sent at 7/14/2017  4:36 PM EDT -----  Regarding: B12 replacement  Please contact patient and asked her to resume oral B12 1000 µg daily over-the-counter. Thanks!  ----- Message -----     From: Lab, Background User     Sent: 7/14/2017  12:08 PM       To: Glenn Lyon Jr., MD

## 2017-07-24 RX ORDER — TAMOXIFEN CITRATE 20 MG/1
TABLET ORAL
Qty: 30 TABLET | Refills: 0 | Status: SHIPPED | OUTPATIENT
Start: 2017-07-24 | End: 2017-08-29 | Stop reason: SDUPTHER

## 2017-08-10 ENCOUNTER — TELEPHONE (OUTPATIENT)
Dept: ONCOLOGY | Facility: HOSPITAL | Age: 42
End: 2017-08-10

## 2017-08-10 DIAGNOSIS — I89.0 LYMPHEDEMA OF UPPER EXTREMITY: Primary | ICD-10-CM

## 2017-08-10 NOTE — TELEPHONE ENCOUNTER
Rec'd call from pt asking about prescription for compression sleeve.  I attempted to return her call but no answer.  I called [facility] who she stated needed the rx.  I spoke with Ami and she gave me the detailed information on what needed to be on the script.  This was all entered and electronically signed per Shelly FRANCES in Dr. Cheung' absence. This was faxed to [facility]- fax receipt noted

## 2017-08-30 RX ORDER — TAMOXIFEN CITRATE 20 MG/1
TABLET ORAL
Qty: 30 TABLET | Refills: 10 | Status: SHIPPED | OUTPATIENT
Start: 2017-08-30 | End: 2018-06-04

## 2017-10-03 ENCOUNTER — CLINICAL SUPPORT (OUTPATIENT)
Dept: OTHER | Facility: HOSPITAL | Age: 42
End: 2017-10-03
Attending: INTERNAL MEDICINE

## 2017-10-03 DIAGNOSIS — C50.812 MALIGNANT NEOPLASM OF OVERLAPPING SITES OF LEFT FEMALE BREAST, UNSPECIFIED ESTROGEN RECEPTOR STATUS (HCC): Primary | ICD-10-CM

## 2017-10-03 PROCEDURE — G0463 HOSPITAL OUTPT CLINIC VISIT: HCPCS

## 2017-10-03 NOTE — PROGRESS NOTES
Labs drawn per right wrist area vein using a 21 gauge butterfly. Sterile 2 x 2 applied. 1 large lavender tube sent [facility].

## 2017-10-18 ENCOUNTER — APPOINTMENT (OUTPATIENT)
Dept: WOMENS IMAGING | Facility: HOSPITAL | Age: 42
End: 2017-10-18

## 2017-10-18 PROCEDURE — G0202 SCR MAMMO BI INCL CAD: HCPCS | Performed by: RADIOLOGY

## 2017-10-18 PROCEDURE — 77063 BREAST TOMOSYNTHESIS BI: CPT | Performed by: RADIOLOGY

## 2017-10-18 PROCEDURE — 77067 SCR MAMMO BI INCL CAD: CPT | Performed by: RADIOLOGY

## 2017-11-08 ENCOUNTER — OFFICE VISIT (OUTPATIENT)
Dept: ONCOLOGY | Facility: CLINIC | Age: 42
End: 2017-11-08

## 2017-11-08 ENCOUNTER — LAB (OUTPATIENT)
Dept: LAB | Facility: HOSPITAL | Age: 42
End: 2017-11-08

## 2017-11-08 VITALS
HEIGHT: 65 IN | BODY MASS INDEX: 48.82 KG/M2 | TEMPERATURE: 98 F | SYSTOLIC BLOOD PRESSURE: 160 MMHG | DIASTOLIC BLOOD PRESSURE: 102 MMHG | HEART RATE: 88 BPM | WEIGHT: 293 LBS | OXYGEN SATURATION: 96 % | RESPIRATION RATE: 16 BRPM

## 2017-11-08 DIAGNOSIS — R53.82 CHRONIC FATIGUE: ICD-10-CM

## 2017-11-08 DIAGNOSIS — C50.812 MALIGNANT NEOPLASM OF OVERLAPPING SITES OF LEFT BREAST IN FEMALE, ESTROGEN RECEPTOR POSITIVE (HCC): Primary | ICD-10-CM

## 2017-11-08 DIAGNOSIS — E55.9 VITAMIN D DEFICIENCY: ICD-10-CM

## 2017-11-08 DIAGNOSIS — C50.812 MALIGNANT NEOPLASM OF OVERLAPPING SITES OF LEFT FEMALE BREAST (HCC): ICD-10-CM

## 2017-11-08 DIAGNOSIS — D50.0 ANEMIA DUE TO BLOOD LOSS: ICD-10-CM

## 2017-11-08 DIAGNOSIS — R76.8 ELEVATED RHEUMATOID FACTOR: ICD-10-CM

## 2017-11-08 DIAGNOSIS — D51.8 OTHER VITAMIN B12 DEFICIENCY ANEMIA: ICD-10-CM

## 2017-11-08 DIAGNOSIS — I89.0 LYMPHEDEMA OF UPPER EXTREMITY: ICD-10-CM

## 2017-11-08 DIAGNOSIS — Z17.0 MALIGNANT NEOPLASM OF OVERLAPPING SITES OF LEFT BREAST IN FEMALE, ESTROGEN RECEPTOR POSITIVE (HCC): Primary | ICD-10-CM

## 2017-11-08 DIAGNOSIS — M25.50 ARTHRALGIA OF MULTIPLE SITES: ICD-10-CM

## 2017-11-08 DIAGNOSIS — D50.8 OTHER IRON DEFICIENCY ANEMIA: ICD-10-CM

## 2017-11-08 LAB
ALBUMIN SERPL-MCNC: 4.1 G/DL (ref 3.5–5.2)
ALBUMIN/GLOB SERPL: 1.2 G/DL (ref 1.1–2.4)
ALP SERPL-CCNC: 79 U/L (ref 38–116)
ALT SERPL W P-5'-P-CCNC: 25 U/L (ref 0–33)
ANION GAP SERPL CALCULATED.3IONS-SCNC: 13 MMOL/L
AST SERPL-CCNC: 30 U/L (ref 0–32)
BASOPHILS # BLD AUTO: 0.06 10*3/MM3 (ref 0–0.1)
BASOPHILS NFR BLD AUTO: 0.8 % (ref 0–1.1)
BILIRUB SERPL-MCNC: <0.2 MG/DL (ref 0.1–1.2)
BUN BLD-MCNC: 16 MG/DL (ref 6–20)
BUN/CREAT SERPL: 25.8 (ref 7.3–30)
CALCIUM SPEC-SCNC: 9.6 MG/DL (ref 8.5–10.2)
CHLORIDE SERPL-SCNC: 102 MMOL/L (ref 98–107)
CO2 SERPL-SCNC: 28 MMOL/L (ref 22–29)
CREAT BLD-MCNC: 0.62 MG/DL (ref 0.6–1.1)
DEPRECATED RDW RBC AUTO: 42.1 FL (ref 37–49)
EOSINOPHIL # BLD AUTO: 0.35 10*3/MM3 (ref 0–0.36)
EOSINOPHIL NFR BLD AUTO: 4.7 % (ref 1–5)
ERYTHROCYTE [DISTWIDTH] IN BLOOD BY AUTOMATED COUNT: 12.3 % (ref 11.7–14.5)
FERRITIN SERPL-MCNC: 39 NG/ML (ref 11–207)
GFR SERPL CREATININE-BSD FRML MDRD: 105 ML/MIN/1.73
GLOBULIN UR ELPH-MCNC: 3.5 GM/DL (ref 1.8–3.5)
GLUCOSE BLD-MCNC: 98 MG/DL (ref 74–124)
HCT VFR BLD AUTO: 40.2 % (ref 34–45)
HGB BLD-MCNC: 12.8 G/DL (ref 11.5–14.9)
IMM GRANULOCYTES # BLD: 0.02 10*3/MM3 (ref 0–0.03)
IMM GRANULOCYTES NFR BLD: 0.3 % (ref 0–0.5)
IRON 24H UR-MRATE: 45 MCG/DL (ref 37–145)
IRON SATN MFR SERPL: 9 % (ref 14–48)
LYMPHOCYTES # BLD AUTO: 2.19 10*3/MM3 (ref 1–3.5)
LYMPHOCYTES NFR BLD AUTO: 29.7 % (ref 20–49)
MCH RBC QN AUTO: 29.3 PG (ref 27–33)
MCHC RBC AUTO-ENTMCNC: 31.8 G/DL (ref 32–35)
MCV RBC AUTO: 92 FL (ref 83–97)
MONOCYTES # BLD AUTO: 0.43 10*3/MM3 (ref 0.25–0.8)
MONOCYTES NFR BLD AUTO: 5.8 % (ref 4–12)
NEUTROPHILS # BLD AUTO: 4.33 10*3/MM3 (ref 1.5–7)
NEUTROPHILS NFR BLD AUTO: 58.7 % (ref 39–75)
NRBC BLD MANUAL-RTO: 0 /100 WBC (ref 0–0)
PLATELET # BLD AUTO: 366 10*3/MM3 (ref 150–375)
PMV BLD AUTO: 9.6 FL (ref 8.9–12.1)
POTASSIUM BLD-SCNC: 3.8 MMOL/L (ref 3.5–4.7)
PROT SERPL-MCNC: 7.6 G/DL (ref 6.3–8)
RBC # BLD AUTO: 4.37 10*6/MM3 (ref 3.9–5)
SODIUM BLD-SCNC: 143 MMOL/L (ref 134–145)
TIBC SERPL-MCNC: 501 MCG/DL (ref 249–505)
TRANSFERRIN SERPL-MCNC: 358 MG/DL (ref 200–360)
WBC NRBC COR # BLD: 7.38 10*3/MM3 (ref 4–10)

## 2017-11-08 PROCEDURE — 85025 COMPLETE CBC W/AUTO DIFF WBC: CPT | Performed by: INTERNAL MEDICINE

## 2017-11-08 PROCEDURE — 83540 ASSAY OF IRON: CPT | Performed by: INTERNAL MEDICINE

## 2017-11-08 PROCEDURE — 84466 ASSAY OF TRANSFERRIN: CPT | Performed by: INTERNAL MEDICINE

## 2017-11-08 PROCEDURE — 82728 ASSAY OF FERRITIN: CPT | Performed by: INTERNAL MEDICINE

## 2017-11-08 PROCEDURE — 82607 VITAMIN B-12: CPT | Performed by: INTERNAL MEDICINE

## 2017-11-08 PROCEDURE — 36415 COLL VENOUS BLD VENIPUNCTURE: CPT | Performed by: INTERNAL MEDICINE

## 2017-11-08 PROCEDURE — 82306 VITAMIN D 25 HYDROXY: CPT | Performed by: INTERNAL MEDICINE

## 2017-11-08 PROCEDURE — 80053 COMPREHEN METABOLIC PANEL: CPT | Performed by: INTERNAL MEDICINE

## 2017-11-08 PROCEDURE — 99215 OFFICE O/P EST HI 40 MIN: CPT | Performed by: INTERNAL MEDICINE

## 2017-11-08 RX ORDER — AZITHROMYCIN 250 MG/1
TABLET, FILM COATED ORAL
Qty: 6 TABLET | Refills: 0 | Status: SHIPPED | OUTPATIENT
Start: 2017-11-08 | End: 2018-02-28

## 2017-11-09 PROBLEM — T45.4X5A IRON AND ITS COMPOUNDS CAUSING ADVERSE EFFECT IN THERAPEUTIC USE: Status: ACTIVE | Noted: 2017-11-09

## 2017-11-09 LAB
25(OH)D3 SERPL-MCNC: 36.1 NG/ML (ref 30–100)
VIT B12 BLD-MCNC: 306 PG/ML (ref 211–946)

## 2017-11-09 RX ORDER — DIPHENHYDRAMINE HCL 25 MG
25 CAPSULE ORAL ONCE
Status: CANCELLED | OUTPATIENT
Start: 2017-11-22

## 2017-11-09 RX ORDER — DIPHENHYDRAMINE HCL 25 MG
25 CAPSULE ORAL ONCE
Status: CANCELLED | OUTPATIENT
Start: 2017-11-15

## 2017-11-09 RX ORDER — FAMOTIDINE 10 MG/ML
20 INJECTION, SOLUTION INTRAVENOUS ONCE
Status: CANCELLED | OUTPATIENT
Start: 2017-11-15

## 2017-11-09 NOTE — PROGRESS NOTES
Subjective .     REASONS FOR FOLLOWUP:    1. Stage IIA ([id number]) left breast cancer:  Status post left mastectomy with left axillary lymph node dissection 03/12/2013.  Lesion #1 invasive ductal carcinoma grade 2, 1.5 cm ER positive (90%), NC positive (1-4%), HER-2/jodi FISH negative (ratio 1.4),  Ki-67 score of 8%.  Lesion #2 invasive ductal carcinoma, grade 2, 1.7 cm,  ER positive (70%), NC negative (%),HER-2/jodi FISH positive (ratio of 3.0).  3 of 15 lymph nodes positive (2 macrometastases, 1 micrometastasis, largest lesion 2.5 cm replacing lymph node).  Lymph node evaluated and is ER positive (90%), NC positive (30%), HER-2/jodi FISH negative (ratio of 1.4).    2. Iron deficiency anemia presumed secondary to menstrual blood loss.  Previously treated with iron sulfate 325 mg daily.  Iron discontinued secondary to GI side effects (diarrhea).  3. BRCA-1 and 2 testing negative.  Subsequent gene panel testing sent 10/3/17 with result pending   4. Participation in the [employer] Clinical trial.   5. Status post Mediport placement by Dr. Neumann 04/08/2013.  Mediport removed by Dr. Mabry 07/21/2014.  6. Initiation of adjuvant chemotherapy 04/16/2013.  Treatment planned:  dose-dense Adriamycin and Cytoxan with Neulasta support q.2 weeks x4 cycles.  Subsequent weekly Taxol and Herceptin x12 weeks.  Subsequent continuation of Herceptin q.3 weeks to complete one year.  Adjuvant endocrine therapy after completion of chemotherapy with tamoxifen (premenopausal).   Adjuvant radiation therapy following completion of chemotherapy.  Last dose of Taxol administered 08/27/2013. Tamoxifen initiated 09/25/2013 (plan to treat for 10 years).   Final dose of Herceptin administered 05/21/2014 (one year of treatment completed 06/11/2014).   7. 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  8. Borderline elevated transaminases. CT abdomen 10/30/2013 with normal appearing liver. Suspected steatohepatitis.    9. Left foot plantar fasciitis with stress fracture of the 4th metatarsal, status post evaluation with bone scan, 09/29/2014 and MRI of the left foot, 10/08/2014. Negative for metastatic disease.    10. Lymphedema of the left upper extremity with compression sleeve in place and having been evaluated in the lymphedema clinic.   11. Left wrist tenosynovitis identified on MRI of 06/18/2015. Status post steroid injection by orthopedics, on 07/18/2015.   12. Anemia with hemoglobin decreased to 10.8 on 3/30/16.  Evaluation revealing evidence of iron deficiency (related to menstrual blood loss) and B12 deficiency.  Initiated oral iron sulfate 325 mg twice a day as well as B12 replacement with weekly injections ×4 and subsequent monthly injections with intended transition to oral B12 1000 µg daily, however, patient did not follow through.  Patient also discontinued oral iron.  Recurrent iron deficiency with oral iron resumed 5/4/17 twice a day dosing.  13. Vitamin D deficiency  14. Positive rheumatoid factor 4/28/17 (16.4), referred to rheumatology for further evaluation.  Initiated treatment with Plaquenil per rheumatology.    HISTORY OF PRESENT ILLNESS:  The patient is a 42 y.o. year old female who is here for follow-up with the above-mentioned history.    History of Present Illness  the patient returns today for a four-month follow-up visit.  At her last visit, her B12 level returned  Ronit to 99 and we contacted her and asked her to resume oral B12 however it does not appear that she did so.  She has been continuing on oral iron once daily however has had difficulty tolerating this with an written diarrhea and cramping.  She did have a menstrual cycle in early September which was severe with profuse bleeding.  She has not experienced a menstrual cycle since that time.  That menstrual cycle was the first since she experienced one in April.  She has continued with difficulty regarding weight loss, currently stable at 299  "pounds.  She works a full schedule teaching.  She does note some \"bodyaches\" and low back pain that are exacerbated by standing during the day and teaching.  She does have some slight lower extremity edema that develops during the day but resolves when she rests at night.  Her arthralgias/myalgias are worse in the morning and improved through the day.  She has noted some fairly significant congestion over the past few weeks with associated headache, cough productive of yellow sputum.  She denies any fevers nor shortness of breath.  She has tried over-the-counter medications to no avail.  She did undergo her recent mammogram 10/18/17 which was negative, BI-RADS 1.  She was seen in the genetics clinic on 10/3/17 with gene panel testing sent.  She received a call from the genetics clinic yesterday but is unaware of any results and is going to make a follow-up appointment.    PAST MEDICAL HISTORY:    1. Hypertension.  2. History of menorrhagia status post [facility] with Dr. Elda Roland 16.  Pathology showed polypoid endometrium with disordered proliferation but no evidence of malignancy.  Menstrual blood loss ceased.  3. History of phentermine use for weight loss.  4. Status post knee surgery after traumatic fracture with hardware in place in .  5. Status post  x2.  6. Status post Mediport placement by Dr. Neumann on 2013. Mediport removed by Dr. Mabry 2014.  7. Left foot plantar fasciitis with stress fracture of the left 4th metatarsal, 2014.   8. Osteopenia.   9. Vitamin D deficiency.   10. Anxiety/depression.  Hospitalization required in 10/2015 due to major depressive disorder with psychotic features.   11. Anemia secondary to iron deficiency (menstrual blood loss) and B12 deficiency.  12. Suspected early rheumatoid arthritis initiated Plaquenil under the direction of Dr. Lopez in rheumatology in May 2017.  Positive rheumatoid factor 16.4 on 17.  13. Status post right carpal " tunnel release surgery in July 2017    OB-GYN HISTORY:   G3, P3, A0, menarche age 10.  The patient has had regular menstrual cycles, however, has some degree of menorrhagia.  She was on oral contraceptives for 3-4 years in her late teens to early 20s. Menstrual cycles ceased with the 1st cycle of chemotherapy.     Menstrual cycles resumed in July 2015. Endometrial biopsy performed due to endometrial thickening in October 2015 and again 1/18/16 with benign pathology.  Recurrent severe dysfunctional uterine bleeding in early March 2016 causing anemia.  D&C 4/13/16-  pathology showed polypoid endometrium with disordered proliferation but no evidence of malignancy.     ONCOLOGIC HISTORY:  (History from previous dates can be found in the separate document.)    Current Outpatient Prescriptions on File Prior to Visit   Medication Sig Dispense Refill   • cholecalciferol (VITAMIN D3) 1000 UNITS tablet Take 1,000 Units by mouth daily.     • escitalopram (LEXAPRO) 20 MG tablet Take 20 mg by mouth daily.     • ferrous sulfate 325 (65 FE) MG tablet Take 325 mg by mouth 2 (two) times a day.     • fluticasone (FLONASE) 50 MCG/ACT nasal spray      • hydroxychloroquine (PLAQUENIL) 200 MG tablet      • Loratadine 10 MG capsule Take 10 mg by mouth Daily.     • LORazepam (ATIVAN) 1 MG tablet Take 1 mg by mouth 3 (three) times a day.     • losartan-hydrochlorothiazide (HYZAAR) 100-25 MG per tablet      • meloxicam (MOBIC) 15 MG tablet      • metoprolol succinate XL (TOPROL-XL) 50 MG 24 hr tablet      • tamoxifen (NOLVADEX) 20 MG chemo tablet TAKE ONE TABLET BY MOUTH DAILY 30 tablet 10   • vitamin D (ERGOCALCIFEROL) 58108 UNITS capsule capsule Take 1 capsule by mouth 1 (One) Time Per Week. For 8 weeks 8 capsule 0     Current Facility-Administered Medications on File Prior to Visit   Medication Dose Route Frequency Provider Last Rate Last Dose   • cyanocobalamin injection 1,000 mcg  1,000 mcg Intramuscular Once Glenn Lyon Jr., MD            ALLERGIES:     Allergies   Allergen Reactions   • Adhesive Tape    • Shellfish-Derived Products GI Intolerance   • Shrimp Flavor GI Intolerance       SOCIAL HISTORY:  The patient is  and lives with her  in [place], [place].  She works as a teacher.  She denies history of smoking, denies significant alcohol use.      FAMILY HISTORY:  Maternal grandmother had breast cancer in her 70s; maternal grandmother’s sister had ovarian cancer in her 70s, and another sister had non-Hodgkin's lymphoma in her 70s.  Maternal grandfather had prostate cancer late in life, and his sister had ovarian cancer later in life.  On paternal side there is a history of diabetes mellitus.  A sister has had issues with anemia and easy bruising.  The patient was seen in Genetics Clinic and underwent BRCA-1 and 2 testing prior to surgery which returned negative.          Review of Systems   Constitutional: Positive for fatigue. Negative for activity change, appetite change, fever and unexpected weight change.   HENT: Positive for congestion. Negative for mouth sores, nosebleeds and voice change.    Respiratory: Positive for cough. Negative for shortness of breath.    Cardiovascular: Negative for chest pain, palpitations and leg swelling.   Gastrointestinal: Negative for abdominal distention, abdominal pain, blood in stool, constipation, diarrhea, nausea and vomiting.   Endocrine: Negative for cold intolerance and heat intolerance.   Genitourinary: Positive for menstrual problem. Negative for difficulty urinating, dysuria, frequency and hematuria.   Musculoskeletal: Positive for arthralgias and myalgias. Negative for back pain and joint swelling.   Skin: Negative for rash.   Neurological: Negative for dizziness, syncope, weakness, light-headedness, numbness and headaches.   Hematological: Negative for adenopathy. Does not bruise/bleed easily.   Psychiatric/Behavioral: Negative for confusion, dysphoric mood and sleep disturbance.  The patient is not nervous/anxious.          Objective      Vitals:    11/08/17 1653   BP: (!) 160/102   Pulse: 88   Resp: 16   Temp: 98 °F (36.7 °C)   SpO2: 96%      Current Status 11/8/2017   ECOG score 0   Pain 0/10    Physical Exam   Constitutional: She is oriented to person, place, and time. She appears well-developed and well-nourished.   HENT:   Mouth/Throat: Oropharynx is clear and moist.   Eyes: Conjunctivae are normal.   Neck: No thyromegaly present.   Cardiovascular: Normal rate and regular rhythm.  Exam reveals no gallop and no friction rub.    No murmur heard.  Pulmonary/Chest: Effort normal and breath sounds normal. No respiratory distress. She has no wheezes.   Right breast without masses nor abnormalities palpated.  Status post left mastectomy.  Chest wall without masses nor abnormalities palpated.  Radiation telangiectasias evident.  Postoperative/post radiation change evident.   Abdominal: Soft. Bowel sounds are normal. She exhibits no distension. There is no tenderness.   Musculoskeletal: She exhibits no edema.   Lymphadenopathy:        Head (right side): No submandibular adenopathy present.     She has no cervical adenopathy.     She has no axillary adenopathy.        Right: No inguinal and no supraclavicular adenopathy present.        Left: No inguinal and no supraclavicular adenopathy present.   Neurological: She is alert and oriented to person, place, and time. She displays normal reflexes. No cranial nerve deficit. She exhibits normal muscle tone.   Skin: Skin is warm and dry. No rash noted.   Psychiatric: She has a normal mood and affect. Her behavior is normal.       RECENT LABS:  Hematology WBC   Date Value Ref Range Status   11/08/2017 7.38 4.00 - 10.00 10*3/mm3 Final     RBC   Date Value Ref Range Status   11/08/2017 4.37 3.90 - 5.00 10*6/mm3 Final     Hemoglobin   Date Value Ref Range Status   11/08/2017 12.8 11.5 - 14.9 g/dL Final     Hematocrit   Date Value Ref Range Status    11/08/2017 40.2 34.0 - 45.0 % Final     Platelets   Date Value Ref Range Status   11/08/2017 366 150 - 375 10*3/mm3 Final        Lab Results   Component Value Date    GLUCOSE 98 11/08/2017    BUN 16 11/08/2017    CREATININE 0.62 11/08/2017    EGFRIFNONA 105 11/08/2017    BCR 25.8 11/08/2017    CO2 28.0 11/08/2017    CALCIUM 9.6 11/08/2017    ALBUMIN 4.10 11/08/2017    LABIL2 1.2 11/08/2017    AST 30 11/08/2017    ALT 25 11/08/2017     Laboratory studies from 4/28/17: Iron 36, ferritin 42.3, iron saturation 7%, TIBC 491, he 12/3/70, CRP 0.48, ESR 16, 25-hydroxy vitamin D 29.8, MARYSE panel negative, rheumatoid factor 16.4    Additional labs 7/14/17:Iron 54, ferritin 46.7, iron saturation 12%, TIBC 448, B12 299    Additional labs today: Iron 45, ferritin 39, iron saturation 9%, TIBC 501      Assessment/Plan     1. Stage IIA ([id number]) left breast cancer:   The patient had 2 primary lesions of the left breast, the first ER positive, IN borderline, HER-2/jodi negative. The second was ER positive, IN negative, HER-2/jodi positive. She had 3 of 15 lymph nodes positive. She received adjuvant dose-dense chemotherapy with AC x4 cycles followed by weekly Taxol x12 doses completed on 08/27/2013. She did receive Herceptin in conjunction with the Taxol and then completed a total of a year with last dose of Herceptin received on 05/21/2014 (3-week dose). She did initiate adjuvant tamoxifen on 09/25/2013 and we anticipate likely a 10-year course of treatment.     The patient returns today with no clinical evidence of recurrent disease.  Her exam today is negative.  She did undergo her annual mammogram 10/18/17 which was negative, BI-RADS 1.  She previously underwent BRCA1 and 2 testing alone and was referred back to the genetics clinic on 10/3/17 at which time she had extended gene panel testing performed.  Results are not yet available and she is scheduling a follow-up appointment there are to review.  I will see her back for  routine follow-up again in 4 months.      2. Depression/anxiety with prior psychotic episode: The patient required hospitalization in the behavioral health unit in October 2015. She has had difficulties with anxiety and depression in the past, however, given her breast cancer diagnosis as well as stressors from her job and family, it appears that these precipitated the event.  She has stabilized on Lexapro at 20 mg per day and does have routine follow-up scheduled with Dr. Camp.   3. Left wrist tenosynovitis: This has improved.  4. Diffuse arthralgias and fatigue with suspected early rheumatoid arthritis: The patient was experiencing diffuse arthralgias as well as pain in her wrists right greater than left.  We did perform an evaluation 4/28/17 with negative MARYSE panel however rheumatoid factor was positive at 16.4.  She was referred to rheumatology Dr. Lopez in May 2017. It appears that the patient was felt to potentially have early autoimmune disorder/rheumatoid arthritis.  She was started on Plaquenil.  Her symptoms are somewhat improved.  She is due for follow-up with rheumatology again later this month.  5. Vitamin D deficiency: The patient went off her vitamin D supplement and developed recurrent vitamin D deficiency.  She had been receiving 1000 units per day with a low level on 5/27/16 of 25.2.  She subsequently increased her dose to 2000 units daily and her level improved to 34.1.  She reported compliance with her vitamin D supplementation and level on 4/28/17 was low at 29.8.  Therefore, she was transitioned to 50,000 units weekly for 8 weeks in early May 2017.  A repeat 25-hydroxy vitamin D level on 7/14/17 was 34.9.  The patient was asked to transition back to a 2000 unit daily dose of vitamin D however she reports that she ran out of the medication around 3 weeks ago.  We are rechecking a vitamin D level today.  I have asked her to resume vitamin D at 2000 units daily.  We will recheck a level  again in 4 months as well.  6. Hot flashes secondary to tamoxifen: The patient is not requiring any pharmacologic therapy for this.  Her symptoms are tolerable and improved.   7. Endometrial thickening with dysfunctional uterine bleeding on tamoxifen: The patient underwent endometrial biopsy in October 2015 and again in 1/18/16 with negative findings.  She then experienced a significant degree of menstrual blood loss in early March 2016 to the point where she became anemic.  She did undergo a D and C with Dr. Roland on 4/13/16.  Pathology showed a polypoid endometrium with disordered proliferation but no evidence of malignancy.  She did see her gynecologist in January 2017 and they had discussed a possible laparoscopic assisted vaginal hysterectomy once she loses some more weight.  The patient has had 2 profuse menstrual cycles, one in April and the next in September 2017.  She is due to see her gynecologist again in January.  8. Anemia: The patient developed significant anemia with a hemoglobin down to 10.8 on 3/30/16.  Evaluation revealed iron deficiency with a ferritin of 12.5 iron percent saturation of 5 and a TIBC 535.  She started oral iron sulfate 325 mg twice a day and was tolerating this very well.  Her hemoglobin improved into the 12 range.  Her iron studies showed a significant improvement previously in October 2016 with ferritin up to 54, iron saturation 16%.  She was asked to continue on oral iron twice a day however she did not do so.  The patient also was found to have B12 deficiency with a level of 187 on 3/30/16.  She started B12 replacement and received weekly dosing ×4 followed bimonthly injections, last received 7/22/16.  As it was difficult for her to come in to the office for B12 injections, she was asked to begin oral B12 1000 µg daily however she did not do this.  On 4/28/17, she was mildly anemic with a hemoglobin of 11.4.  She had a thrombocytosis as well, related to iron deficiency.  Repeat  iron studies 4/28/17 showed a ferritin that declined to 42, iron saturation low at 7%, elevated TIBC at 491. She was asked to resume oral iron twice daily.  B12 level 4/28/17 had remained in the low normal range at 370 and was monitored.  On 7/14/17, B12 level was again low normal at 299 if she was asked to resume oral B12 however she did not do so.  She is only able to tolerate oral iron once a day and is having some difficulty with this with diarrhea and abdominal cramping.  Her current labs show a declining iron saturation 9% and a slight decline in ferritin down to 39 with a TIBC at the 501.  Her hemoglobin is up to 12.8.  I have suggested that we discontinue oral iron and that she receive Feraheme ×2 doses beginning next week and she is in agreement.  She will resume her oral B12 at 1000 µg daily and we will recheck a level again in 4 months.   9. Elevated transaminases: The patient has experienced this in the past with negative hepatic imaging.  She had very mild elevation 4/28/17.  Currently, her liver function studies have normalized.  If her levels become more significantly elevated, we will consider additional imaging evaluation.  I suspect that she has underlying steatohepatitis.  10. Sinusitis/bronchitis: The patient has been experiencing congestion productive cough over the past 2 weeks with no improvement despite over the counter medications.  I will go ahead and prescribe a Z-Theodore at this point.  11. Left upper extremity lymphedema: The patient does wear her sleeve intermittently, is not wearing it today.  She notes that her symptoms are improved with the cool weather.    PLAN:    1. Additional labs pending today with B12 and 25-hydroxy vitamin D level.  2. Continue adjuvant tamoxifen 20 mg daily  3. Resume vitamin D supplementation 2000 units daily  4. Resume oral B12 1000 µg daily.  5. Discontinue oral iron due to intolerance.  6. In 1 week return to begin Feraheme 510 mg IV ×2 doses one week  apart.  7. Prescription and sent for Z-Theodore today.  8. Obtain results from recent genetics clinic visit with gene panel test  9. Follow-up with gynecology as scheduled in January 2018  10. Follow-up with rheumatology as scheduled later this month  11. M.D. visit in 1 month with CBC, CMP, iron panel, ferritin, B12, 25-hydroxy vitamin D level.    I did spend 40 minutes with the patient today, 80% of that time in face-to-face consultation and coordination of care reviewing issues outlined above in the assessment and plan.

## 2017-11-15 ENCOUNTER — INFUSION (OUTPATIENT)
Dept: ONCOLOGY | Facility: HOSPITAL | Age: 42
End: 2017-11-15

## 2017-11-15 VITALS
TEMPERATURE: 98.6 F | OXYGEN SATURATION: 97 % | SYSTOLIC BLOOD PRESSURE: 123 MMHG | DIASTOLIC BLOOD PRESSURE: 76 MMHG | HEART RATE: 90 BPM

## 2017-11-15 DIAGNOSIS — IMO0001 IRON AND ITS COMPOUNDS CAUSING ADVERSE EFFECT IN THERAPEUTIC USE, SUBSEQUENT ENCOUNTER: Primary | ICD-10-CM

## 2017-11-15 DIAGNOSIS — D50.8 OTHER IRON DEFICIENCY ANEMIA: ICD-10-CM

## 2017-11-15 PROCEDURE — 96375 TX/PRO/DX INJ NEW DRUG ADDON: CPT

## 2017-11-15 PROCEDURE — 96374 THER/PROPH/DIAG INJ IV PUSH: CPT

## 2017-11-15 PROCEDURE — 63710000001 DIPHENHYDRAMINE PER 50 MG: Performed by: INTERNAL MEDICINE

## 2017-11-15 PROCEDURE — 25010000002 FERUMOXYTOL 510 MG/17ML SOLUTION 510 MG VIAL: Performed by: INTERNAL MEDICINE

## 2017-11-15 RX ORDER — FAMOTIDINE 10 MG/ML
20 INJECTION, SOLUTION INTRAVENOUS ONCE
Status: COMPLETED | OUTPATIENT
Start: 2017-11-15 | End: 2017-11-15

## 2017-11-15 RX ORDER — DIPHENHYDRAMINE HCL 25 MG
25 CAPSULE ORAL ONCE
Status: COMPLETED | OUTPATIENT
Start: 2017-11-15 | End: 2017-11-15

## 2017-11-15 RX ADMIN — FAMOTIDINE 20 MG: 10 INJECTION, SOLUTION INTRAVENOUS at 16:32

## 2017-11-15 RX ADMIN — SODIUM CHLORIDE 250 ML: 900 INJECTION, SOLUTION INTRAVENOUS at 16:31

## 2017-11-15 RX ADMIN — DIPHENHYDRAMINE HYDROCHLORIDE 25 MG: 25 CAPSULE ORAL at 16:33

## 2017-11-15 RX ADMIN — FERUMOXYTOL 510 MG: 510 INJECTION INTRAVENOUS at 16:59

## 2017-11-22 ENCOUNTER — INFUSION (OUTPATIENT)
Dept: ONCOLOGY | Facility: HOSPITAL | Age: 42
End: 2017-11-22

## 2017-11-22 VITALS
SYSTOLIC BLOOD PRESSURE: 146 MMHG | TEMPERATURE: 98 F | DIASTOLIC BLOOD PRESSURE: 86 MMHG | WEIGHT: 293 LBS | HEART RATE: 121 BPM | BODY MASS INDEX: 48.9 KG/M2

## 2017-11-22 DIAGNOSIS — IMO0001 IRON AND ITS COMPOUNDS CAUSING ADVERSE EFFECT IN THERAPEUTIC USE, SUBSEQUENT ENCOUNTER: Primary | ICD-10-CM

## 2017-11-22 DIAGNOSIS — D50.8 OTHER IRON DEFICIENCY ANEMIA: ICD-10-CM

## 2017-11-22 PROCEDURE — 96375 TX/PRO/DX INJ NEW DRUG ADDON: CPT | Performed by: INTERNAL MEDICINE

## 2017-11-22 PROCEDURE — 63710000001 DIPHENHYDRAMINE PER 50 MG: Performed by: INTERNAL MEDICINE

## 2017-11-22 PROCEDURE — 96374 THER/PROPH/DIAG INJ IV PUSH: CPT | Performed by: INTERNAL MEDICINE

## 2017-11-22 PROCEDURE — 25010000002 FERUMOXYTOL 510 MG/17ML SOLUTION 510 MG VIAL: Performed by: INTERNAL MEDICINE

## 2017-11-22 RX ORDER — FAMOTIDINE 10 MG/ML
20 INJECTION, SOLUTION INTRAVENOUS ONCE
Status: COMPLETED | OUTPATIENT
Start: 2017-11-22 | End: 2017-11-22

## 2017-11-22 RX ORDER — DIPHENHYDRAMINE HCL 25 MG
25 CAPSULE ORAL ONCE
Status: COMPLETED | OUTPATIENT
Start: 2017-11-22 | End: 2017-11-22

## 2017-11-22 RX ADMIN — DIPHENHYDRAMINE HYDROCHLORIDE 25 MG: 25 CAPSULE ORAL at 13:51

## 2017-11-22 RX ADMIN — SODIUM CHLORIDE 250 ML: 900 INJECTION, SOLUTION INTRAVENOUS at 13:50

## 2017-11-22 RX ADMIN — FERUMOXYTOL 510 MG: 510 INJECTION INTRAVENOUS at 14:29

## 2017-11-22 RX ADMIN — FAMOTIDINE 20 MG: 10 INJECTION, SOLUTION INTRAVENOUS at 13:52

## 2017-12-19 ENCOUNTER — CLINICAL SUPPORT (OUTPATIENT)
Dept: OTHER | Facility: HOSPITAL | Age: 42
End: 2017-12-19

## 2017-12-19 DIAGNOSIS — C50.812 MALIGNANT NEOPLASM OF OVERLAPPING SITES OF LEFT FEMALE BREAST, UNSPECIFIED ESTROGEN RECEPTOR STATUS (HCC): Primary | ICD-10-CM

## 2017-12-19 PROCEDURE — G0463 HOSPITAL OUTPT CLINIC VISIT: HCPCS

## 2018-02-28 ENCOUNTER — OFFICE VISIT (OUTPATIENT)
Dept: ONCOLOGY | Facility: CLINIC | Age: 43
End: 2018-02-28

## 2018-02-28 ENCOUNTER — LAB (OUTPATIENT)
Dept: LAB | Facility: HOSPITAL | Age: 43
End: 2018-02-28

## 2018-02-28 VITALS
HEIGHT: 65 IN | RESPIRATION RATE: 16 BRPM | HEART RATE: 108 BPM | WEIGHT: 293 LBS | DIASTOLIC BLOOD PRESSURE: 86 MMHG | TEMPERATURE: 99.6 F | OXYGEN SATURATION: 95 % | BODY MASS INDEX: 48.82 KG/M2 | SYSTOLIC BLOOD PRESSURE: 118 MMHG

## 2018-02-28 DIAGNOSIS — D51.8 OTHER VITAMIN B12 DEFICIENCY ANEMIA: ICD-10-CM

## 2018-02-28 DIAGNOSIS — D50.8 OTHER IRON DEFICIENCY ANEMIA: ICD-10-CM

## 2018-02-28 DIAGNOSIS — M25.50 ARTHRALGIA OF MULTIPLE SITES: ICD-10-CM

## 2018-02-28 DIAGNOSIS — Z17.0 MALIGNANT NEOPLASM OF OVERLAPPING SITES OF LEFT BREAST IN FEMALE, ESTROGEN RECEPTOR POSITIVE (HCC): ICD-10-CM

## 2018-02-28 DIAGNOSIS — R53.82 CHRONIC FATIGUE: ICD-10-CM

## 2018-02-28 DIAGNOSIS — C50.812 MALIGNANT NEOPLASM OF OVERLAPPING SITES OF LEFT BREAST IN FEMALE, ESTROGEN RECEPTOR POSITIVE (HCC): ICD-10-CM

## 2018-02-28 DIAGNOSIS — E55.9 VITAMIN D DEFICIENCY: ICD-10-CM

## 2018-02-28 DIAGNOSIS — D50.0 ANEMIA DUE TO BLOOD LOSS: ICD-10-CM

## 2018-02-28 DIAGNOSIS — E55.9 VITAMIN D DEFICIENCY: Primary | ICD-10-CM

## 2018-02-28 LAB
25(OH)D3 SERPL-MCNC: 34.9 NG/ML (ref 30–100)
ALBUMIN SERPL-MCNC: 4.4 G/DL (ref 3.5–5.2)
ALBUMIN/GLOB SERPL: 1.3 G/DL (ref 1.1–2.4)
ALP SERPL-CCNC: 69 U/L (ref 38–116)
ALT SERPL W P-5'-P-CCNC: 25 U/L (ref 0–33)
ANION GAP SERPL CALCULATED.3IONS-SCNC: 12.5 MMOL/L
AST SERPL-CCNC: 22 U/L (ref 0–32)
BASOPHILS # BLD AUTO: 0.06 10*3/MM3 (ref 0–0.1)
BASOPHILS NFR BLD AUTO: 0.6 % (ref 0–1.1)
BILIRUB SERPL-MCNC: 0.2 MG/DL (ref 0.1–1.2)
BUN BLD-MCNC: 12 MG/DL (ref 6–20)
BUN/CREAT SERPL: 19.7 (ref 7.3–30)
CALCIUM SPEC-SCNC: 9.8 MG/DL (ref 8.5–10.2)
CHLORIDE SERPL-SCNC: 97 MMOL/L (ref 98–107)
CO2 SERPL-SCNC: 29.5 MMOL/L (ref 22–29)
CREAT BLD-MCNC: 0.61 MG/DL (ref 0.6–1.1)
DEPRECATED RDW RBC AUTO: 41.9 FL (ref 37–49)
EOSINOPHIL # BLD AUTO: 0.21 10*3/MM3 (ref 0–0.36)
EOSINOPHIL NFR BLD AUTO: 2.1 % (ref 1–5)
ERYTHROCYTE [DISTWIDTH] IN BLOOD BY AUTOMATED COUNT: 12.7 % (ref 11.7–14.5)
FERRITIN SERPL-MCNC: 222.4 NG/ML (ref 11–207)
GFR SERPL CREATININE-BSD FRML MDRD: 107 ML/MIN/1.73
GLOBULIN UR ELPH-MCNC: 3.4 GM/DL (ref 1.8–3.5)
GLUCOSE BLD-MCNC: 107 MG/DL (ref 74–124)
HCT VFR BLD AUTO: 39.7 % (ref 34–45)
HGB BLD-MCNC: 13.3 G/DL (ref 11.5–14.9)
IMM GRANULOCYTES # BLD: 0.05 10*3/MM3 (ref 0–0.03)
IMM GRANULOCYTES NFR BLD: 0.5 % (ref 0–0.5)
IRON 24H UR-MRATE: 56 MCG/DL (ref 37–145)
IRON SATN MFR SERPL: 13 % (ref 14–48)
LYMPHOCYTES # BLD AUTO: 2.54 10*3/MM3 (ref 1–3.5)
LYMPHOCYTES NFR BLD AUTO: 26 % (ref 20–49)
MCH RBC QN AUTO: 30.3 PG (ref 27–33)
MCHC RBC AUTO-ENTMCNC: 33.5 G/DL (ref 32–35)
MCV RBC AUTO: 90.4 FL (ref 83–97)
MONOCYTES # BLD AUTO: 0.55 10*3/MM3 (ref 0.25–0.8)
MONOCYTES NFR BLD AUTO: 5.6 % (ref 4–12)
NEUTROPHILS # BLD AUTO: 6.37 10*3/MM3 (ref 1.5–7)
NEUTROPHILS NFR BLD AUTO: 65.2 % (ref 39–75)
NRBC BLD MANUAL-RTO: 0 /100 WBC (ref 0–0)
PLATELET # BLD AUTO: 360 10*3/MM3 (ref 150–375)
PMV BLD AUTO: 9.5 FL (ref 8.9–12.1)
POTASSIUM BLD-SCNC: 3.8 MMOL/L (ref 3.5–4.7)
PROT SERPL-MCNC: 7.8 G/DL (ref 6.3–8)
RBC # BLD AUTO: 4.39 10*6/MM3 (ref 3.9–5)
SODIUM BLD-SCNC: 139 MMOL/L (ref 134–145)
TIBC SERPL-MCNC: 448 MCG/DL (ref 249–505)
TRANSFERRIN SERPL-MCNC: 320 MG/DL (ref 200–360)
VIT B12 BLD-MCNC: 507 PG/ML (ref 211–946)
WBC NRBC COR # BLD: 9.78 10*3/MM3 (ref 4–10)

## 2018-02-28 PROCEDURE — 82306 VITAMIN D 25 HYDROXY: CPT | Performed by: INTERNAL MEDICINE

## 2018-02-28 PROCEDURE — 36415 COLL VENOUS BLD VENIPUNCTURE: CPT | Performed by: INTERNAL MEDICINE

## 2018-02-28 PROCEDURE — 82728 ASSAY OF FERRITIN: CPT | Performed by: INTERNAL MEDICINE

## 2018-02-28 PROCEDURE — 83540 ASSAY OF IRON: CPT | Performed by: INTERNAL MEDICINE

## 2018-02-28 PROCEDURE — 99214 OFFICE O/P EST MOD 30 MIN: CPT | Performed by: INTERNAL MEDICINE

## 2018-02-28 PROCEDURE — 80053 COMPREHEN METABOLIC PANEL: CPT | Performed by: INTERNAL MEDICINE

## 2018-02-28 PROCEDURE — 82607 VITAMIN B-12: CPT | Performed by: INTERNAL MEDICINE

## 2018-02-28 PROCEDURE — 85025 COMPLETE CBC W/AUTO DIFF WBC: CPT | Performed by: INTERNAL MEDICINE

## 2018-02-28 PROCEDURE — 84466 ASSAY OF TRANSFERRIN: CPT | Performed by: INTERNAL MEDICINE

## 2018-02-28 RX ORDER — NAPROXEN 500 MG/1
500 TABLET ORAL AS NEEDED
COMMUNITY
Start: 2017-11-22 | End: 2019-10-25

## 2018-06-04 ENCOUNTER — APPOINTMENT (OUTPATIENT)
Dept: PREADMISSION TESTING | Facility: HOSPITAL | Age: 43
End: 2018-06-04

## 2018-06-04 VITALS
BODY MASS INDEX: 50.02 KG/M2 | RESPIRATION RATE: 16 BRPM | HEART RATE: 118 BPM | HEIGHT: 64 IN | OXYGEN SATURATION: 97 % | TEMPERATURE: 97.3 F | WEIGHT: 293 LBS

## 2018-06-04 LAB
ANION GAP SERPL CALCULATED.3IONS-SCNC: 18.5 MMOL/L
BASOPHILS # BLD AUTO: 0.03 10*3/MM3 (ref 0–0.2)
BASOPHILS NFR BLD AUTO: 0.3 % (ref 0–1.5)
BUN BLD-MCNC: 11 MG/DL (ref 6–20)
BUN/CREAT SERPL: 15.1 (ref 7–25)
CALCIUM SPEC-SCNC: 9.4 MG/DL (ref 8.6–10.5)
CHLORIDE SERPL-SCNC: 95 MMOL/L (ref 98–107)
CO2 SERPL-SCNC: 25.5 MMOL/L (ref 22–29)
CREAT BLD-MCNC: 0.73 MG/DL (ref 0.57–1)
DEPRECATED RDW RBC AUTO: 44.6 FL (ref 37–54)
EOSINOPHIL # BLD AUTO: 0.27 10*3/MM3 (ref 0–0.7)
EOSINOPHIL NFR BLD AUTO: 2.7 % (ref 0.3–6.2)
ERYTHROCYTE [DISTWIDTH] IN BLOOD BY AUTOMATED COUNT: 13 % (ref 11.7–13)
GFR SERPL CREATININE-BSD FRML MDRD: 87 ML/MIN/1.73
GLUCOSE BLD-MCNC: 188 MG/DL (ref 65–99)
HCG SERPL QL: NEGATIVE
HCT VFR BLD AUTO: 38.2 % (ref 35.6–45.5)
HGB BLD-MCNC: 11.9 G/DL (ref 11.9–15.5)
IMM GRANULOCYTES # BLD: 0.06 10*3/MM3 (ref 0–0.03)
IMM GRANULOCYTES NFR BLD: 0.6 % (ref 0–0.5)
LYMPHOCYTES # BLD AUTO: 1.83 10*3/MM3 (ref 0.9–4.8)
LYMPHOCYTES NFR BLD AUTO: 18.3 % (ref 19.6–45.3)
MCH RBC QN AUTO: 29.7 PG (ref 26.9–32)
MCHC RBC AUTO-ENTMCNC: 31.2 G/DL (ref 32.4–36.3)
MCV RBC AUTO: 95.3 FL (ref 80.5–98.2)
MONOCYTES # BLD AUTO: 0.43 10*3/MM3 (ref 0.2–1.2)
MONOCYTES NFR BLD AUTO: 4.3 % (ref 5–12)
NEUTROPHILS # BLD AUTO: 7.37 10*3/MM3 (ref 1.9–8.1)
NEUTROPHILS NFR BLD AUTO: 73.8 % (ref 42.7–76)
PLATELET # BLD AUTO: 398 10*3/MM3 (ref 140–500)
PMV BLD AUTO: 10.2 FL (ref 6–12)
POTASSIUM BLD-SCNC: 3.7 MMOL/L (ref 3.5–5.2)
RBC # BLD AUTO: 4.01 10*6/MM3 (ref 3.9–5.2)
SODIUM BLD-SCNC: 139 MMOL/L (ref 136–145)
WBC NRBC COR # BLD: 9.99 10*3/MM3 (ref 4.5–10.7)

## 2018-06-04 PROCEDURE — 84703 CHORIONIC GONADOTROPIN ASSAY: CPT | Performed by: OBSTETRICS & GYNECOLOGY

## 2018-06-04 PROCEDURE — 85025 COMPLETE CBC W/AUTO DIFF WBC: CPT | Performed by: OBSTETRICS & GYNECOLOGY

## 2018-06-04 PROCEDURE — 93005 ELECTROCARDIOGRAM TRACING: CPT

## 2018-06-04 PROCEDURE — 80048 BASIC METABOLIC PNL TOTAL CA: CPT | Performed by: OBSTETRICS & GYNECOLOGY

## 2018-06-04 PROCEDURE — 93010 ELECTROCARDIOGRAM REPORT: CPT | Performed by: INTERNAL MEDICINE

## 2018-06-04 PROCEDURE — 36415 COLL VENOUS BLD VENIPUNCTURE: CPT

## 2018-06-04 RX ORDER — CHOLECALCIFEROL (VITAMIN D3) 25 MCG
1 TABLET,CHEWABLE ORAL DAILY
COMMUNITY
End: 2020-01-17

## 2018-06-04 RX ORDER — TAMOXIFEN CITRATE 20 MG/1
20 TABLET ORAL DAILY
COMMUNITY
End: 2018-11-20 | Stop reason: ALTCHOICE

## 2018-06-04 NOTE — DISCHARGE INSTRUCTIONS
PLEASE ARRIVE AT 5:30 AM TOMORROW MORNING      Take the following medications the morning of surgery with a small sip of water:        General Instructions:  • Do not eat solid food after midnight the night before surgery.  • You may drink clear liquids day of surgery but must stop at least one hour before your hospital arrival time. **STOP FLUIDS AT 4:30 AM DAY OF SURGERY**  • It is beneficial for you to have a clear drink that contains carbohydrates the day of surgery.  We suggest a 12 to 20 ounce bottle of Gatorade or Powerade for non-diabetic patients or a 12 to 20 ounce bottle of G2 or Powerade Zero for diabetic patients. (Pediatric patients, are not advised to drink a 12 to 20 ounce carbohydrate drink)    Clear liquids are liquids you can see through.  Nothing red in color.     Plain water                               Sports drinks  Sodas                                   Gelatin (Jell-O)  Fruit juices without pulp such as white grape juice and apple juice  Popsicles that contain no fruit or yogurt  Tea or coffee (no cream or milk added)  Gatorade / Powerade  G2 / Powerade Zero    • Infants may have breast milk up to four hours before surgery.  • Infants drinking formula may drink formula up to six hours before surgery.   • Patients who avoid smoking, chewing tobacco and alcohol for 4 weeks prior to surgery have a reduced risk of post-operative complications.  Quit smoking as many days before surgery as you can.  • Do not smoke, use chewing tobacco or drink alcohol the day of surgery.   • If applicable bring your C-PAP/ BI-PAP machine.  • Bring any papers given to you in the doctor’s office.  • Wear clean comfortable clothes and socks.  • Do not wear contact lenses or make-up.  Bring a case for your glasses.   • Bring crutches or walker if applicable.  • Remove all piercings.  Leave jewelry and any other valuables at home.  • Hair extensions with metal clips must be removed prior to surgery.  • The  Pre-Admission Testing nurse will instruct you to bring medications if unable to obtain an accurate list in Pre-Admission Testing.          Preventing a Surgical Site Infection:  • For 2 to 3 days before surgery, avoid shaving with a razor because the razor can irritate skin and make it easier to develop an infection.  • The night prior to surgery sleep in a clean bed with clean clothing.  Do not allow pets to sleep with you.  • Shower on the morning of surgery using a fresh bar of anti-bacterial soap (such as Dial) and clean washcloth.  Dry with a clean towel and dress in clean clothing.  • Ask your surgeon if you will be receiving antibiotics prior to surgery.  • Make sure you, your family, and all healthcare providers clean their hands with soap and water or an alcohol based hand  before caring for you or your wound.    Day of surgery:  Upon arrival, a Pre-op nurse and Anesthesiologist will review your health history, obtain vital signs, and answer questions you may have.  The only belongings needed at this time will be your home medications and if applicable your C-PAP/BI-PAP machine.  If you are staying overnight your family can leave the rest of your belongings in the car and bring them to your room later.  A Pre-op nurse will start an IV and you may receive medication in preparation for surgery, including something to help you relax.  Your family will be able to see you in the Pre-op area.  While you are in surgery your family should notify the waiting room  if they leave the waiting room area and provide a contact phone number.    Please be aware that surgery does come with discomfort.  We want to make every effort to control your discomfort so please discuss any uncontrolled symptoms with your nurse.   Your doctor will most likely have prescribed pain medications.      If you are going home after surgery you will receive individualized written care instructions before being discharged.   A responsible adult must drive you to and from the hospital on the day of your surgery and stay with you for 24 hours.    If you are staying overnight following surgery, you will be transported to your hospital room following the recovery period.  [facility] has all private rooms.    If you have any questions please call Pre-Admission Testing at [phone].  Deductibles and co-payments are collected on the day of service. Please be prepared to pay the required co-pay, deductible or deposit on the day of service as defined by your plan.

## 2018-06-05 ENCOUNTER — HOSPITAL ENCOUNTER (OUTPATIENT)
Facility: HOSPITAL | Age: 43
Discharge: HOME | End: 2018-06-06
Attending: OBSTETRICS & GYNECOLOGY | Admitting: OBSTETRICS & GYNECOLOGY

## 2018-06-05 ENCOUNTER — ANESTHESIA (OUTPATIENT)
Dept: PERIOP | Facility: HOSPITAL | Age: 43
End: 2018-06-05

## 2018-06-05 ENCOUNTER — ANESTHESIA EVENT (OUTPATIENT)
Dept: PERIOP | Facility: HOSPITAL | Age: 43
End: 2018-06-05

## 2018-06-05 DIAGNOSIS — N95.0 POST-MENOPAUSAL BLEEDING: ICD-10-CM

## 2018-06-05 LAB
ANION GAP SERPL CALCULATED.3IONS-SCNC: 20.2 MMOL/L
B-HCG UR QL: NEGATIVE
BUN BLD-MCNC: 9 MG/DL (ref 6–20)
BUN/CREAT SERPL: 12.5 (ref 7–25)
CALCIUM SPEC-SCNC: 8.7 MG/DL (ref 8.6–10.5)
CHLORIDE SERPL-SCNC: 96 MMOL/L (ref 98–107)
CO2 SERPL-SCNC: 20.8 MMOL/L (ref 22–29)
CREAT BLD-MCNC: 0.72 MG/DL (ref 0.57–1)
DEPRECATED RDW RBC AUTO: 45.8 FL (ref 37–54)
ERYTHROCYTE [DISTWIDTH] IN BLOOD BY AUTOMATED COUNT: 13.3 % (ref 11.7–13)
GFR SERPL CREATININE-BSD FRML MDRD: 88 ML/MIN/1.73
GLUCOSE BLD-MCNC: 238 MG/DL (ref 65–99)
GLUCOSE BLDC GLUCOMTR-MCNC: 146 MG/DL (ref 70–130)
HCT VFR BLD AUTO: 34.9 % (ref 35.6–45.5)
HGB BLD-MCNC: 11 G/DL (ref 11.9–15.5)
INTERNAL NEGATIVE CONTROL: NEGATIVE
INTERNAL POSITIVE CONTROL: POSITIVE
Lab: NORMAL
MCH RBC QN AUTO: 29.9 PG (ref 26.9–32)
MCHC RBC AUTO-ENTMCNC: 31.5 G/DL (ref 32.4–36.3)
MCV RBC AUTO: 94.8 FL (ref 80.5–98.2)
PLATELET # BLD AUTO: 409 10*3/MM3 (ref 140–500)
PMV BLD AUTO: 9.8 FL (ref 6–12)
POTASSIUM BLD-SCNC: 4.3 MMOL/L (ref 3.5–5.2)
RBC # BLD AUTO: 3.68 10*6/MM3 (ref 3.9–5.2)
SODIUM BLD-SCNC: 137 MMOL/L (ref 136–145)
WBC NRBC COR # BLD: 18.78 10*3/MM3 (ref 4.5–10.7)

## 2018-06-05 PROCEDURE — 25010000002 MORPHINE PER 10 MG: Performed by: OBSTETRICS & GYNECOLOGY

## 2018-06-05 PROCEDURE — 25010000002 ONDANSETRON PER 1 MG: Performed by: NURSE ANESTHETIST, CERTIFIED REGISTERED

## 2018-06-05 PROCEDURE — 25010000002 PROPOFOL 10 MG/ML EMULSION: Performed by: NURSE ANESTHETIST, CERTIFIED REGISTERED

## 2018-06-05 PROCEDURE — 25010000002 PHENYLEPHRINE PER 1 ML: Performed by: NURSE ANESTHETIST, CERTIFIED REGISTERED

## 2018-06-05 PROCEDURE — C1771 REP DEV, URINARY, W/SLING: HCPCS | Performed by: OBSTETRICS & GYNECOLOGY

## 2018-06-05 PROCEDURE — 25010000003 CEFAZOLIN IN DEXTROSE 2-4 GM/100ML-% SOLUTION: Performed by: OBSTETRICS & GYNECOLOGY

## 2018-06-05 PROCEDURE — 80048 BASIC METABOLIC PNL TOTAL CA: CPT | Performed by: OBSTETRICS & GYNECOLOGY

## 2018-06-05 PROCEDURE — 25010000002 FENTANYL CITRATE (PF) 100 MCG/2ML SOLUTION: Performed by: NURSE ANESTHETIST, CERTIFIED REGISTERED

## 2018-06-05 PROCEDURE — G0378 HOSPITAL OBSERVATION PER HR: HCPCS

## 2018-06-05 PROCEDURE — 25010000002 MIDAZOLAM PER 1 MG: Performed by: ANESTHESIOLOGY

## 2018-06-05 PROCEDURE — 88307 TISSUE EXAM BY PATHOLOGIST: CPT | Performed by: OBSTETRICS & GYNECOLOGY

## 2018-06-05 PROCEDURE — 85027 COMPLETE CBC AUTOMATED: CPT | Performed by: OBSTETRICS & GYNECOLOGY

## 2018-06-05 PROCEDURE — 25010000002 KETOROLAC TROMETHAMINE PER 15 MG: Performed by: NURSE ANESTHETIST, CERTIFIED REGISTERED

## 2018-06-05 PROCEDURE — 25010000002 HYDROMORPHONE PER 4 MG: Performed by: NURSE ANESTHETIST, CERTIFIED REGISTERED

## 2018-06-05 PROCEDURE — 25010000002 DEXAMETHASONE PER 1 MG: Performed by: NURSE ANESTHETIST, CERTIFIED REGISTERED

## 2018-06-05 PROCEDURE — 82962 GLUCOSE BLOOD TEST: CPT

## 2018-06-05 DEVICE — SLNG TVT EXACT CONTINENCE SYS BX/1EA: Type: IMPLANTABLE DEVICE | Status: FUNCTIONAL

## 2018-06-05 RX ORDER — ONDANSETRON 4 MG/1
4 TABLET, FILM COATED ORAL EVERY 6 HOURS PRN
Status: DISCONTINUED | OUTPATIENT
Start: 2018-06-05 | End: 2018-06-06 | Stop reason: HOSPADM

## 2018-06-05 RX ORDER — SODIUM CHLORIDE 9 MG/ML
75 INJECTION, SOLUTION INTRAVENOUS CONTINUOUS
Status: DISCONTINUED | OUTPATIENT
Start: 2018-06-05 | End: 2018-06-06 | Stop reason: HOSPADM

## 2018-06-05 RX ORDER — ONDANSETRON 2 MG/ML
4 INJECTION INTRAMUSCULAR; INTRAVENOUS ONCE AS NEEDED
Status: DISCONTINUED | OUTPATIENT
Start: 2018-06-05 | End: 2018-06-05 | Stop reason: HOSPADM

## 2018-06-05 RX ORDER — LIDOCAINE HYDROCHLORIDE 10 MG/ML
0.5 INJECTION, SOLUTION EPIDURAL; INFILTRATION; INTRACAUDAL; PERINEURAL ONCE AS NEEDED
Status: DISCONTINUED | OUTPATIENT
Start: 2018-06-05 | End: 2018-06-05 | Stop reason: HOSPADM

## 2018-06-05 RX ORDER — ONDANSETRON 2 MG/ML
INJECTION INTRAMUSCULAR; INTRAVENOUS AS NEEDED
Status: DISCONTINUED | OUTPATIENT
Start: 2018-06-05 | End: 2018-06-05 | Stop reason: SURG

## 2018-06-05 RX ORDER — LABETALOL HYDROCHLORIDE 5 MG/ML
5 INJECTION, SOLUTION INTRAVENOUS
Status: DISCONTINUED | OUTPATIENT
Start: 2018-06-05 | End: 2018-06-05 | Stop reason: HOSPADM

## 2018-06-05 RX ORDER — ONDANSETRON 2 MG/ML
4 INJECTION INTRAMUSCULAR; INTRAVENOUS EVERY 6 HOURS PRN
Status: DISCONTINUED | OUTPATIENT
Start: 2018-06-05 | End: 2018-06-06 | Stop reason: HOSPADM

## 2018-06-05 RX ORDER — MAGNESIUM HYDROXIDE 1200 MG/15ML
LIQUID ORAL AS NEEDED
Status: DISCONTINUED | OUTPATIENT
Start: 2018-06-05 | End: 2018-06-05 | Stop reason: HOSPADM

## 2018-06-05 RX ORDER — BUPIVACAINE HYDROCHLORIDE AND EPINEPHRINE 5; 5 MG/ML; UG/ML
INJECTION, SOLUTION PERINEURAL AS NEEDED
Status: DISCONTINUED | OUTPATIENT
Start: 2018-06-05 | End: 2018-06-06 | Stop reason: HOSPADM

## 2018-06-05 RX ORDER — HYDRALAZINE HYDROCHLORIDE 20 MG/ML
5 INJECTION INTRAMUSCULAR; INTRAVENOUS
Status: DISCONTINUED | OUTPATIENT
Start: 2018-06-05 | End: 2018-06-05 | Stop reason: HOSPADM

## 2018-06-05 RX ORDER — ROCURONIUM BROMIDE 10 MG/ML
INJECTION, SOLUTION INTRAVENOUS AS NEEDED
Status: DISCONTINUED | OUTPATIENT
Start: 2018-06-05 | End: 2018-06-05 | Stop reason: SURG

## 2018-06-05 RX ORDER — SIMETHICONE 80 MG
80 TABLET,CHEWABLE ORAL 4 TIMES DAILY PRN
Status: DISCONTINUED | OUTPATIENT
Start: 2018-06-05 | End: 2018-06-06 | Stop reason: HOSPADM

## 2018-06-05 RX ORDER — MIDAZOLAM HYDROCHLORIDE 1 MG/ML
2 INJECTION INTRAMUSCULAR; INTRAVENOUS
Status: DISCONTINUED | OUTPATIENT
Start: 2018-06-05 | End: 2018-06-05 | Stop reason: HOSPADM

## 2018-06-05 RX ORDER — HYDROMORPHONE HYDROCHLORIDE 1 MG/ML
0.5 INJECTION, SOLUTION INTRAMUSCULAR; INTRAVENOUS; SUBCUTANEOUS
Status: DISCONTINUED | OUTPATIENT
Start: 2018-06-05 | End: 2018-06-05 | Stop reason: HOSPADM

## 2018-06-05 RX ORDER — NALOXONE HCL 0.4 MG/ML
0.2 VIAL (ML) INJECTION AS NEEDED
Status: DISCONTINUED | OUTPATIENT
Start: 2018-06-05 | End: 2018-06-05 | Stop reason: HOSPADM

## 2018-06-05 RX ORDER — DIPHENHYDRAMINE HCL 25 MG
25 CAPSULE ORAL NIGHTLY PRN
Status: DISCONTINUED | OUTPATIENT
Start: 2018-06-05 | End: 2018-06-06 | Stop reason: HOSPADM

## 2018-06-05 RX ORDER — KETOROLAC TROMETHAMINE 30 MG/ML
INJECTION, SOLUTION INTRAMUSCULAR; INTRAVENOUS AS NEEDED
Status: DISCONTINUED | OUTPATIENT
Start: 2018-06-05 | End: 2018-06-05 | Stop reason: SURG

## 2018-06-05 RX ORDER — DEXAMETHASONE SODIUM PHOSPHATE 10 MG/ML
INJECTION INTRAMUSCULAR; INTRAVENOUS AS NEEDED
Status: DISCONTINUED | OUTPATIENT
Start: 2018-06-05 | End: 2018-06-05 | Stop reason: SURG

## 2018-06-05 RX ORDER — PROMETHAZINE HYDROCHLORIDE 25 MG/ML
12.5 INJECTION, SOLUTION INTRAMUSCULAR; INTRAVENOUS ONCE AS NEEDED
Status: DISCONTINUED | OUTPATIENT
Start: 2018-06-05 | End: 2018-06-05 | Stop reason: HOSPADM

## 2018-06-05 RX ORDER — PROMETHAZINE HYDROCHLORIDE 25 MG/1
25 SUPPOSITORY RECTAL ONCE AS NEEDED
Status: DISCONTINUED | OUTPATIENT
Start: 2018-06-05 | End: 2018-06-05 | Stop reason: HOSPADM

## 2018-06-05 RX ORDER — MIDAZOLAM HYDROCHLORIDE 1 MG/ML
1 INJECTION INTRAMUSCULAR; INTRAVENOUS
Status: DISCONTINUED | OUTPATIENT
Start: 2018-06-05 | End: 2018-06-05 | Stop reason: HOSPADM

## 2018-06-05 RX ORDER — SODIUM CHLORIDE 0.9 % (FLUSH) 0.9 %
1-10 SYRINGE (ML) INJECTION AS NEEDED
Status: DISCONTINUED | OUTPATIENT
Start: 2018-06-05 | End: 2018-06-05 | Stop reason: HOSPADM

## 2018-06-05 RX ORDER — HYDROMORPHONE HCL 110MG/55ML
PATIENT CONTROLLED ANALGESIA SYRINGE INTRAVENOUS AS NEEDED
Status: DISCONTINUED | OUTPATIENT
Start: 2018-06-05 | End: 2018-06-05 | Stop reason: SURG

## 2018-06-05 RX ORDER — HYDROCODONE BITARTRATE AND ACETAMINOPHEN 5; 325 MG/1; MG/1
1 TABLET ORAL EVERY 4 HOURS PRN
Status: DISCONTINUED | OUTPATIENT
Start: 2018-06-05 | End: 2018-06-06 | Stop reason: HOSPADM

## 2018-06-05 RX ORDER — SODIUM CHLORIDE, SODIUM LACTATE, POTASSIUM CHLORIDE, CALCIUM CHLORIDE 600; 310; 30; 20 MG/100ML; MG/100ML; MG/100ML; MG/100ML
9 INJECTION, SOLUTION INTRAVENOUS CONTINUOUS
Status: DISCONTINUED | OUTPATIENT
Start: 2018-06-05 | End: 2018-06-05

## 2018-06-05 RX ORDER — FENTANYL CITRATE 50 UG/ML
50 INJECTION, SOLUTION INTRAMUSCULAR; INTRAVENOUS
Status: DISCONTINUED | OUTPATIENT
Start: 2018-06-05 | End: 2018-06-05 | Stop reason: HOSPADM

## 2018-06-05 RX ORDER — ACETAMINOPHEN 650 MG/1
650 SUPPOSITORY RECTAL EVERY 4 HOURS PRN
Status: DISCONTINUED | OUTPATIENT
Start: 2018-06-05 | End: 2018-06-06 | Stop reason: HOSPADM

## 2018-06-05 RX ORDER — PROPOFOL 10 MG/ML
VIAL (ML) INTRAVENOUS AS NEEDED
Status: DISCONTINUED | OUTPATIENT
Start: 2018-06-05 | End: 2018-06-05 | Stop reason: SURG

## 2018-06-05 RX ORDER — PHENAZOPYRIDINE HYDROCHLORIDE 200 MG/1
200 TABLET, FILM COATED ORAL ONCE
Status: COMPLETED | OUTPATIENT
Start: 2018-06-05 | End: 2018-06-05

## 2018-06-05 RX ORDER — MORPHINE SULFATE 2 MG/ML
2 INJECTION, SOLUTION INTRAMUSCULAR; INTRAVENOUS EVERY 4 HOURS PRN
Status: DISCONTINUED | OUTPATIENT
Start: 2018-06-05 | End: 2018-06-06 | Stop reason: HOSPADM

## 2018-06-05 RX ORDER — FLUMAZENIL 0.1 MG/ML
0.2 INJECTION INTRAVENOUS AS NEEDED
Status: DISCONTINUED | OUTPATIENT
Start: 2018-06-05 | End: 2018-06-05 | Stop reason: HOSPADM

## 2018-06-05 RX ORDER — CEFAZOLIN SODIUM 2 G/100ML
2 INJECTION, SOLUTION INTRAVENOUS
Status: COMPLETED | OUTPATIENT
Start: 2018-06-05 | End: 2018-06-05

## 2018-06-05 RX ORDER — PROMETHAZINE HYDROCHLORIDE 25 MG/1
12.5 TABLET ORAL ONCE AS NEEDED
Status: DISCONTINUED | OUTPATIENT
Start: 2018-06-05 | End: 2018-06-05 | Stop reason: HOSPADM

## 2018-06-05 RX ORDER — DIPHENHYDRAMINE HYDROCHLORIDE 50 MG/ML
25 INJECTION INTRAMUSCULAR; INTRAVENOUS NIGHTLY PRN
Status: DISCONTINUED | OUTPATIENT
Start: 2018-06-05 | End: 2018-06-06 | Stop reason: HOSPADM

## 2018-06-05 RX ORDER — PROMETHAZINE HYDROCHLORIDE 25 MG/1
25 TABLET ORAL ONCE AS NEEDED
Status: DISCONTINUED | OUTPATIENT
Start: 2018-06-05 | End: 2018-06-05 | Stop reason: HOSPADM

## 2018-06-05 RX ORDER — HYDROCODONE BITARTRATE AND ACETAMINOPHEN 10; 325 MG/1; MG/1
1 TABLET ORAL EVERY 4 HOURS PRN
Status: DISCONTINUED | OUTPATIENT
Start: 2018-06-05 | End: 2018-06-06 | Stop reason: HOSPADM

## 2018-06-05 RX ORDER — EPHEDRINE SULFATE 50 MG/ML
5 INJECTION, SOLUTION INTRAVENOUS ONCE AS NEEDED
Status: DISCONTINUED | OUTPATIENT
Start: 2018-06-05 | End: 2018-06-05 | Stop reason: HOSPADM

## 2018-06-05 RX ORDER — ACETAMINOPHEN 325 MG/1
650 TABLET ORAL EVERY 4 HOURS PRN
Status: DISCONTINUED | OUTPATIENT
Start: 2018-06-05 | End: 2018-06-06 | Stop reason: HOSPADM

## 2018-06-05 RX ORDER — FENTANYL CITRATE 50 UG/ML
INJECTION, SOLUTION INTRAMUSCULAR; INTRAVENOUS AS NEEDED
Status: DISCONTINUED | OUTPATIENT
Start: 2018-06-05 | End: 2018-06-05 | Stop reason: SURG

## 2018-06-05 RX ORDER — DOCUSATE SODIUM 100 MG/1
100 CAPSULE, LIQUID FILLED ORAL 2 TIMES DAILY PRN
Status: DISCONTINUED | OUTPATIENT
Start: 2018-06-05 | End: 2018-06-06 | Stop reason: HOSPADM

## 2018-06-05 RX ORDER — NALOXONE HCL 0.4 MG/ML
0.4 VIAL (ML) INJECTION
Status: DISCONTINUED | OUTPATIENT
Start: 2018-06-05 | End: 2018-06-06 | Stop reason: HOSPADM

## 2018-06-05 RX ORDER — OXYCODONE AND ACETAMINOPHEN 7.5; 325 MG/1; MG/1
1 TABLET ORAL ONCE AS NEEDED
Status: DISCONTINUED | OUTPATIENT
Start: 2018-06-05 | End: 2018-06-05 | Stop reason: HOSPADM

## 2018-06-05 RX ORDER — HYDROCODONE BITARTRATE AND ACETAMINOPHEN 7.5; 325 MG/1; MG/1
1 TABLET ORAL ONCE AS NEEDED
Status: DISCONTINUED | OUTPATIENT
Start: 2018-06-05 | End: 2018-06-05 | Stop reason: HOSPADM

## 2018-06-05 RX ORDER — IBUPROFEN 600 MG/1
600 TABLET ORAL EVERY 6 HOURS PRN
Status: DISCONTINUED | OUTPATIENT
Start: 2018-06-05 | End: 2018-06-06 | Stop reason: HOSPADM

## 2018-06-05 RX ORDER — SODIUM CHLORIDE 9 MG/ML
INJECTION, SOLUTION INTRAVENOUS AS NEEDED
Status: DISCONTINUED | OUTPATIENT
Start: 2018-06-05 | End: 2018-06-05 | Stop reason: HOSPADM

## 2018-06-05 RX ORDER — WATER 1000 ML/1000ML
INJECTION, SOLUTION INTRAVENOUS AS NEEDED
Status: DISCONTINUED | OUTPATIENT
Start: 2018-06-05 | End: 2018-06-05 | Stop reason: HOSPADM

## 2018-06-05 RX ORDER — FAMOTIDINE 10 MG/ML
20 INJECTION, SOLUTION INTRAVENOUS ONCE
Status: COMPLETED | OUTPATIENT
Start: 2018-06-05 | End: 2018-06-05

## 2018-06-05 RX ORDER — LIDOCAINE HYDROCHLORIDE 20 MG/ML
INJECTION, SOLUTION INFILTRATION; PERINEURAL AS NEEDED
Status: DISCONTINUED | OUTPATIENT
Start: 2018-06-05 | End: 2018-06-05 | Stop reason: SURG

## 2018-06-05 RX ORDER — ACETAMINOPHEN 160 MG/5ML
650 SOLUTION ORAL EVERY 4 HOURS PRN
Status: DISCONTINUED | OUTPATIENT
Start: 2018-06-05 | End: 2018-06-06 | Stop reason: HOSPADM

## 2018-06-05 RX ORDER — DIPHENHYDRAMINE HYDROCHLORIDE 50 MG/ML
12.5 INJECTION INTRAMUSCULAR; INTRAVENOUS
Status: DISCONTINUED | OUTPATIENT
Start: 2018-06-05 | End: 2018-06-05 | Stop reason: HOSPADM

## 2018-06-05 RX ORDER — ONDANSETRON 4 MG/1
4 TABLET, ORALLY DISINTEGRATING ORAL EVERY 6 HOURS PRN
Status: DISCONTINUED | OUTPATIENT
Start: 2018-06-05 | End: 2018-06-06 | Stop reason: HOSPADM

## 2018-06-05 RX ADMIN — LIDOCAINE HYDROCHLORIDE 100 MG: 20 INJECTION, SOLUTION INFILTRATION; PERINEURAL at 07:36

## 2018-06-05 RX ADMIN — HYDROCODONE BITARTRATE AND ACETAMINOPHEN 1 TABLET: 10; 325 TABLET ORAL at 17:31

## 2018-06-05 RX ADMIN — ROCURONIUM BROMIDE 50 MG: 10 INJECTION INTRAVENOUS at 07:36

## 2018-06-05 RX ADMIN — HYDROMORPHONE HYDROCHLORIDE 0.5 MG: 2 INJECTION INTRAMUSCULAR; INTRAVENOUS; SUBCUTANEOUS at 10:47

## 2018-06-05 RX ADMIN — SUGAMMADEX 300 MG: 100 INJECTION, SOLUTION INTRAVENOUS at 10:46

## 2018-06-05 RX ADMIN — PHENYLEPHRINE HYDROCHLORIDE 100 MCG: 10 INJECTION INTRAVENOUS at 08:02

## 2018-06-05 RX ADMIN — PHENYLEPHRINE HYDROCHLORIDE 100 MCG: 10 INJECTION INTRAVENOUS at 07:49

## 2018-06-05 RX ADMIN — PHENAZOPYRIDINE HYDROCHLORIDE 200 MG: 200 TABLET, FILM COATED ORAL at 07:03

## 2018-06-05 RX ADMIN — HYDROCODONE BITARTRATE AND ACETAMINOPHEN 1 TABLET: 10; 325 TABLET ORAL at 13:22

## 2018-06-05 RX ADMIN — FENTANYL CITRATE 50 MCG: 50 INJECTION, SOLUTION INTRAMUSCULAR; INTRAVENOUS at 08:41

## 2018-06-05 RX ADMIN — FAMOTIDINE 20 MG: 10 INJECTION INTRAVENOUS at 07:03

## 2018-06-05 RX ADMIN — ROCURONIUM BROMIDE 30 MG: 10 INJECTION INTRAVENOUS at 08:10

## 2018-06-05 RX ADMIN — FENTANYL CITRATE 50 MCG: 50 INJECTION INTRAMUSCULAR; INTRAVENOUS at 12:04

## 2018-06-05 RX ADMIN — EPHEDRINE SULFATE 10 MG: 50 INJECTION INTRAMUSCULAR; INTRAVENOUS; SUBCUTANEOUS at 08:11

## 2018-06-05 RX ADMIN — SODIUM CHLORIDE, POTASSIUM CHLORIDE, SODIUM LACTATE AND CALCIUM CHLORIDE: 600; 310; 30; 20 INJECTION, SOLUTION INTRAVENOUS at 08:51

## 2018-06-05 RX ADMIN — ONDANSETRON 4 MG: 2 INJECTION INTRAMUSCULAR; INTRAVENOUS at 10:18

## 2018-06-05 RX ADMIN — MORPHINE SULFATE 2 MG: 2 INJECTION, SOLUTION INTRAMUSCULAR; INTRAVENOUS at 13:28

## 2018-06-05 RX ADMIN — FENTANYL CITRATE 50 MCG: 50 INJECTION, SOLUTION INTRAMUSCULAR; INTRAVENOUS at 08:45

## 2018-06-05 RX ADMIN — FENTANYL CITRATE 100 MCG: 50 INJECTION, SOLUTION INTRAMUSCULAR; INTRAVENOUS at 08:51

## 2018-06-05 RX ADMIN — HYDROCODONE BITARTRATE AND ACETAMINOPHEN 1 TABLET: 10; 325 TABLET ORAL at 21:44

## 2018-06-05 RX ADMIN — FENTANYL CITRATE 50 MCG: 50 INJECTION, SOLUTION INTRAMUSCULAR; INTRAVENOUS at 07:36

## 2018-06-05 RX ADMIN — MIDAZOLAM 1 MG: 1 INJECTION INTRAMUSCULAR; INTRAVENOUS at 07:04

## 2018-06-05 RX ADMIN — SODIUM CHLORIDE 75 ML/HR: 9 INJECTION, SOLUTION INTRAVENOUS at 13:22

## 2018-06-05 RX ADMIN — EPHEDRINE SULFATE 10 MG: 50 INJECTION INTRAMUSCULAR; INTRAVENOUS; SUBCUTANEOUS at 08:15

## 2018-06-05 RX ADMIN — IBUPROFEN 600 MG: 600 TABLET ORAL at 23:32

## 2018-06-05 RX ADMIN — HYDROMORPHONE HYDROCHLORIDE 0.5 MG: 2 INJECTION INTRAMUSCULAR; INTRAVENOUS; SUBCUTANEOUS at 10:34

## 2018-06-05 RX ADMIN — PHENYLEPHRINE HYDROCHLORIDE 100 MCG: 10 INJECTION INTRAVENOUS at 09:33

## 2018-06-05 RX ADMIN — KETOROLAC TROMETHAMINE 30 MG: 30 INJECTION, SOLUTION INTRAMUSCULAR; INTRAVENOUS at 10:35

## 2018-06-05 RX ADMIN — ROCURONIUM BROMIDE 20 MG: 10 INJECTION INTRAVENOUS at 09:30

## 2018-06-05 RX ADMIN — SODIUM CHLORIDE, POTASSIUM CHLORIDE, SODIUM LACTATE AND CALCIUM CHLORIDE 9 ML/HR: 600; 310; 30; 20 INJECTION, SOLUTION INTRAVENOUS at 07:03

## 2018-06-05 RX ADMIN — IBUPROFEN 600 MG: 600 TABLET ORAL at 16:17

## 2018-06-05 RX ADMIN — ROCURONIUM BROMIDE 20 MG: 10 INJECTION INTRAVENOUS at 08:33

## 2018-06-05 RX ADMIN — PROPOFOL 200 MG: 10 INJECTION, EMULSION INTRAVENOUS at 07:36

## 2018-06-05 RX ADMIN — CEFAZOLIN SODIUM 2 G: 2 INJECTION, SOLUTION INTRAVENOUS at 07:41

## 2018-06-05 RX ADMIN — DEXAMETHASONE SODIUM PHOSPHATE 8 MG: 10 INJECTION INTRAMUSCULAR; INTRAVENOUS at 08:51

## 2018-06-05 NOTE — H&P
Interval H&P    I have reviewed the H&P from 04/11/2018 (see scanned H&P) and there are no interval changes noted.  Pt. scheduled for TLH, BSO, and TVT.  Place SCDs, IV, and administer Ancef 30-60 min prior to scheduled procedure.  Consent form reviewed, signed, and placed in chart.  Proceed to OR.     Vitals:    06/05/18 0608   BP: 124/47   Pulse: 82   Resp: 20   Temp: 97.7 °F (36.5 °C)   SpO2: 97%       Zachary Robin MD- [facility]

## 2018-06-05 NOTE — ANESTHESIA PROCEDURE NOTES
Airway  Urgency: elective    Airway not difficult    General Information and Staff    Patient location during procedure: OR  Anesthesiologist: LUPE MIGUEL  CRNA: KAMLA CORONA    Indications and Patient Condition  Indications for airway management: airway protection    Preoxygenated: yes  Mask difficulty assessment: 2 - vent by mask + OA or adjuvant +/- NMBA    Final Airway Details  Final airway type: endotracheal airway      Successful airway: ETT  Cuffed: yes   Successful intubation technique: direct laryngoscopy and RSI  Endotracheal tube insertion site: oral  Blade: Olmedo  Blade size: #2  ETT size: 7.0 mm  Cormack-Lehane Classification: grade I - full view of glottis  Placement verified by: chest auscultation and capnometry   Cuff volume (mL): 8  Number of attempts at approach: 1    Additional Comments  PreO2 with 100% O2;  FeO2 >85%;  sniff position; RSI; Intubated with no difficultly after eyes taped; Appears atraumatic;  Lips and teeth intact as preop condition;  Airway secured. Connected to ventilator.

## 2018-06-05 NOTE — PLAN OF CARE
Problem: Patient Care Overview  Goal: Plan of Care Review  Outcome: Ongoing (interventions implemented as appropriate)   06/05/18 4624   Coping/Psychosocial   Plan of Care Reviewed With patient   Plan of Care Review   Progress no change   OTHER   Outcome Summary VSS. IVF infusing. Medicated for pain. No reports of nausea. Tolerating regular food. Plan for D/C tomorrow. F/C to bedside.      Goal: Individualization and Mutuality  Outcome: Ongoing (interventions implemented as appropriate)    Goal: Discharge Needs Assessment  Outcome: Ongoing (interventions implemented as appropriate)    Goal: Interprofessional Rounds/Family Conf  Outcome: Ongoing (interventions implemented as appropriate)      Problem: Hysterectomy (Adult)  Goal: Signs and Symptoms of Listed Potential Problems Will be Absent, Minimized or Managed (Hysterectomy)  Outcome: Ongoing (interventions implemented as appropriate)

## 2018-06-05 NOTE — ANESTHESIA POSTPROCEDURE EVALUATION
Patient: Yu Thakur    Procedure Summary     Date:  06/05/18 Room / Location:  [facility] OR [address] [place] [address] MAIN OR    Anesthesia Start:  0735 Anesthesia Stop:  1105    Procedure:  TOTAL LAPAROSCOPIC HYSTERECTOMY SHA SALPNGO OOPHORECTOMY TENSION FREE VAGINAL TAPING CYSTOSCOPY (Bilateral Abdomen) Diagnosis:      Surgeon:  Jocelynn Zimmerman MD Provider:  Floresita Colón MD    Anesthesia Type:  general ASA Status:  3          Anesthesia Type: general  Last vitals  BP   116/74 (06/05/18 1215)   Temp   36.6 °C (97.8 °F) (06/05/18 1215)   Pulse   107 (06/05/18 1215)   Resp   12 (06/05/18 1215)     SpO2   96 % (06/05/18 1215)     Post Anesthesia Care and Evaluation    Patient location during evaluation: bedside  Patient participation: complete - patient participated  Level of consciousness: awake  Pain management: adequate  Airway patency: patent  Anesthetic complications: No anesthetic complications    Cardiovascular status: acceptable  Respiratory status: acceptable  Hydration status: acceptable

## 2018-06-05 NOTE — OP NOTE
GYN OPERATIVE NOTE     Date of surgery:6/5/2018   Patient ID: Yu Thakur    YOB: 1975   MRN: [id number]      Pre-op Dx:    - HUMBERTO  - Postmenopausal bleeding     Post-op Dx: Same     Procedure:   1) Total laparoscopic hysterectomy  2) Bilateral salpingectomy  3) Cystoscopy  4) TVT     Surgeon: Dr. Zimmerman  Asst: Dr. Zachary COLEMAN Fellow  Anes: GETA  Antibiotics: Cefazolin 2g IV    IVF: 1700cc  EBL: 100cc  UOP: 300cc  Findings: Omental adhesions to the anterior abdominal wall. Large, mobile uterus. Otherwise normal appearing ovaries and fallopian tubes bilaterally. Normal appearing appendix. On cystoscopy bladder intact and ureters patent bilaterally.    Specimen: Uterus, cervix and bilateral fallopian tubes.   Complications none  Status: Stable to PACU     PROCEDURE IN DETAIL     Patient was taken to the operating room where anesthesia was induced without difficulty. Patient was then placed in dorsal lithotomy positioning using viviana stirrups. Prepped and draped in the normal fashion. The mejias was placed at the beginning of the case without any difficulty. A sponge stick manipulator was placed without difficulty for uterine manipulation.      Attention was then turned to the abomen. An incision was made at the base of the umbilicus and the Veress needle was used for entry. Correct positioning was verified by the pressure noted on the system. The abdomen was insufflated to a pressure of 15mmHg. A 5mm trocar was placed through the umbilicus. Next, under direct visualization a 5mm port was placed in the right lower quadrant, a 5mm port was placed in the left lower quadrant and finally another 5mm port was placed suprapubically under direct visualization. A survey of the pelvis revealed findings as above. Both ureters were observed prior to starting the case.       Attention was turned to the left side where the tube and ovary were  from the IP ligament using the harmonic scalpel. The  utero-ovarian ligament was then desiccated and transected using the harmonic scalpel as well.  A similar technique was used to desiccate and transect the round ligament which was carried down to the anterior leaf of the broad ligament and subsequently was then intimate with the scarring from the bladder to the anterior portion of the uterus. These adhesions were carefully taken down using the harmonic scalpel and the bladder flap was developed. The posterior leaf of the broad ligament was taken down to the level of the uterine vessels. The uterine vessels were skeletonized and coagulated and transected using the harmonic scalpel. These vessels were carefully lateralized and hemostasis was noted.        The procedure was repeated on the right side exactly as it was performed on the left side. Attention was then turned to the bladder flap. Dissection of the bladder was performed until visualization of the pubocervical fascia was clearly noted. At this time we placed the sponge stick anterior to the cervix to delineate the level of the vagina and the anterior colpotomy was performed. The sponge stick was then placed in the posterior cul-de-sac and the posterior culdotomy was made. The colpotomy was then completed by connecting both the anterior and posterior incisions.      Vaginal morcellation was performed taking care to protect the vaginal mucosa, the bladder anteriorly and the rectum posteriorly. The specimen was then removed from the pelvis and sent for permanent pathology.    The vaginal cuff was closed with 0-PDS stratafix in running fashion in 2 layers. At this point we confirmed that we maintained hemostasis from above. All laparoscopic instruments were removed from the field and counts were correct x 2. Skin incisions were closed using 4-0 vicryl and Dermabond.      Cystoscopy was then performed and the bladder was noted to be intact and bilateral ureters were noted to be patent with brisk efflux of orange  urine bilaterally.     Next, attention was turned to the TVT. The anterior vaginal mucosa beneath the midurethra was infiltrated with 0.5% Marcaine with epinephrine. A vertical midline incision was made beneath the midurethra. Careful submucosal dissection was performed to identify the endopelvic fascia. TVT trocar was then placed to the right of the urethra, into the space of Retzius, across the back of the pubis to exit through the skin, approximately 2.5 cm to the right of midline. Identical process was performed to the left of the urethra without difficulty. Both trocars were left in place. Terry catheter was removed and cystoscope was inserted. Complete evaluation of the bladder mucosa was performed noting perforation of the bladder with the trocar on the left side. This was removed and replaced in the correct position without difficulty. On inspection there were no lacerations, dimpling, tears, bleeding or trocar penetration of the mucosa or muscular layers bilaterally. Both ureteral orifices were identified. Prompt excretion of orange-tinged urine from both ureteral orifices was noted. Cystoscope was withdrawn. Both trocars were probed through the abdominal skin to place the vaginal tape beneath the midurethra. Careful tensioning of the tape was performed using Troy scissors placed vertically beneath the urethra to allow proper tensioning. Sheaths were removed from both mesh tapes which were then trimmed to the level of the skin. The vaginal mucosa was closed using 2-0 vicryl suture.     All sponge and needle counts were correct x 2     Dr. Zimmerman was scrubbed and present throughout the entire procedure     Zachary Robin MD-[facility]  6/5/2018  11:21 AM

## 2018-06-05 NOTE — NURSING NOTE
Tried to contact Dr. Zimmerman's Fellow for catheter removal orders. No phone number in chart. Tried calling office, they did not have a number for her either. At this time, catheter will stay in place till AM.

## 2018-06-05 NOTE — DISCHARGE SUMMARY
Inpatient Discharge Summary     BRIEF OVERVIEW  Admitting Provider: Jocelynn Zimmerman MD  Discharge Provider: Jocelynn Zimmerman MD     Admission Date:6/5/2018   Discharge Date: 6/6/2018      Primary Discharge Diagnosis  HUMBERTO  Postmenopausal bleeding     Secondary Discharge Diagnosis  Same     Discharge Disposition  Home to self care  Code Status at Discharge: Full code     Active Issues Requiring Follow-up  None     Outpatient Follow-Up  F/u with Dr. Jocelynn Zimmerman in 2 weeks postop     Test Results Pending at Discharge  None    DETAILS OF HOSPITAL STAY     Presenting Problem/History of Present Illness  Patient Active Problem List   Diagnosis   • Essential hypertension, malignant   • Malignant neoplasm of overlapping sites of left breast in female, estrogen receptor positive   • Vitamin D deficiency   • Anemia due to blood loss   • Depression   • Anxiety   • Iron deficiency anemia   • B12 deficiency anemia   • Bronchitis   • H/O Lymphedema of left upper extremity   • Chronic fatigue   • Arthralgia of multiple sites   • Elevated rheumatoid factor   • Iron and its compounds causing adverse effect in therapeutic use       Hospital Course  Patient was transferred to her room from PACU in stable condition. Her pain was well controlled on PO medications. She returned quickly to ambulating and was able to tolerate a regular diet without N/V. She has passed flatus. Her VSS remained stable and WNL. Terry catheter was removed and she passed her voiding trial.     She was deemed stable for discharge home to self care on POD#1 as she is meeting all postoperative milestones with no evidence of hemodynamic instability or infection.       Operative Procedures Performed  Procedure(s):    Treatments: surgery: as above  Consults: none  Procedures: none  Pertinent Test Results: CBC and BMP reviewed     Physical Exam at Discharge  /73 (BP Location: Right arm, Patient Position: Lying)   Pulse 88   Temp 97.7 °F (36.5 °C) (Oral)   Resp 16   " Ht 162.6 cm (64\")   Wt (!) 138 kg (303 lb 9.2 oz)   LMP 05/26/2018   SpO2 95%   BMI 52.11 kg/m²    Gen: well appearing. NAD  Heart: RRR, no m/r/g  Lungs: CTAB  Abd: soft, non-tender, non-distended. No rebound or guarding. Active bowel sounds  Incision: C/D/I  Pelvic: no vaginal bleeding.   Extremities: WWP    Discharge Condition: good        Your medication list      START taking these medications      Instructions Last Dose Given Next Dose Due   docusate sodium 250 MG capsule  Commonly known as:  COLACE      Take 1 capsule by mouth 2 (Two) Times a Day As Needed for Constipation.       HYDROcodone-acetaminophen 5-325 MG per tablet  Commonly known as:  NORCO      Take 1 tablet by mouth Every 6 (Six) Hours As Needed for Severe Pain .       ibuprofen 600 MG tablet  Commonly known as:  ADVIL,MOTRIN      Take 1 tablet by mouth Every 6 (Six) Hours As Needed for Mild Pain  or Moderate Pain .       nitrofurantoin (macrocrystal-monohydrate) 100 MG capsule  Commonly known as:  MACROBID      Take 1 capsule by mouth Every Night for 5 days.          CONTINUE taking these medications      Instructions Last Dose Given Next Dose Due   B-12 1000 MCG capsule      Take 1 capsule by mouth Daily.       cholecalciferol 1000 units tablet  Commonly known as:  VITAMIN D3      Take 1,000 Units by mouth daily.       escitalopram 20 MG tablet  Commonly known as:  LEXAPRO      Take 20 mg by mouth daily.       ferrous sulfate 325 (65 FE) MG tablet      Take 325 mg by mouth 2 (two) times a day.       fluticasone 50 MCG/ACT nasal spray  Commonly known as:  FLONASE      1 spray into each nostril As Needed for Allergies.       hydroxychloroquine 200 MG tablet  Commonly known as:  PLAQUENIL      Take 200 mg by mouth Daily.       Loratadine 10 MG capsule      Take 10 mg by mouth Daily.       LORazepam 1 MG tablet  Commonly known as:  ATIVAN      Take 1 mg by mouth 3 (three) times a day.       losartan-hydrochlorothiazide 100-25 MG per " tablet  Commonly known as:  HYZAAR      Take 1 tablet by mouth Every Night.       metFORMIN 500 MG tablet  Commonly known as:  GLUCOPHAGE      Take 500 mg by mouth Daily With Breakfast.       metoprolol succinate XL 50 MG 24 hr tablet  Commonly known as:  TOPROL-XL      Take 50 mg by mouth Every Night.       naproxen 500 MG tablet  Commonly known as:  NAPROSYN      Take 500 mg by mouth As Needed for Moderate Pain .       tamoxifen 20 MG chemo tablet  Commonly known as:  NOLVADEX      Take 20 mg by mouth Daily.       vitamin D 01544 units capsule capsule  Commonly known as:  ERGOCALCIFEROL      Take 1 capsule by mouth 1 (One) Time Per Week. For 8 weeks             Where to Get Your Medications      These medications were sent to [facility] [address] [place] [address], [place] - [address] - [phone] [facility] [phone]   [address], [facility] [address]    Phone:  [phone]   · docusate sodium 250 MG capsule  · ibuprofen 600 MG tablet  · nitrofurantoin (macrocrystal-monohydrate) 100 MG capsule     You can get these medications from any pharmacy    Bring a paper prescription for each of these medications  · HYDROcodone-acetaminophen 5-325 MG per tablet       Plan discussed with Dr. Zimmerman who agrees    Zachary Robin MD [facility] Fellow  6/6/2018  7:10 AM

## 2018-06-05 NOTE — ANESTHESIA PREPROCEDURE EVALUATION
Anesthesia Evaluation     Patient summary reviewed and Nursing notes reviewed   NPO Solid Status: > 8 hours  NPO Liquid Status: > 2 hours           Airway   Mallampati: II  no difficulty expected  Dental - normal exam     Pulmonary     breath sounds clear to auscultation  (+) sleep apnea,   Cardiovascular     ECG reviewed  Rhythm: regular  Rate: normal    (+) hypertension,       Neuro/Psych  (+) numbness, psychiatric history,     GI/Hepatic/Renal/Endo    (+) morbid obesity,      Musculoskeletal     Abdominal    Substance History      OB/GYN          Other   (+) arthritis   history of cancer                    Anesthesia Plan    ASA 3     general

## 2018-06-06 VITALS
HEIGHT: 64 IN | DIASTOLIC BLOOD PRESSURE: 73 MMHG | OXYGEN SATURATION: 97 % | TEMPERATURE: 97.9 F | RESPIRATION RATE: 18 BRPM | SYSTOLIC BLOOD PRESSURE: 113 MMHG | BODY MASS INDEX: 50.02 KG/M2 | HEART RATE: 90 BPM | WEIGHT: 293 LBS

## 2018-06-06 PROBLEM — N95.0 POSTMENOPAUSAL BLEEDING: Status: RESOLVED | Noted: 2018-06-05 | Resolved: 2018-06-06

## 2018-06-06 LAB
CYTO UR: NORMAL
LAB AP CASE REPORT: NORMAL
Lab: NORMAL
PATH REPORT.FINAL DX SPEC: NORMAL
PATH REPORT.GROSS SPEC: NORMAL

## 2018-06-06 PROCEDURE — G0378 HOSPITAL OBSERVATION PER HR: HCPCS

## 2018-06-06 RX ORDER — HYDROCODONE BITARTRATE AND ACETAMINOPHEN 5; 325 MG/1; MG/1
1 TABLET ORAL EVERY 6 HOURS PRN
Status: DISCONTINUED | OUTPATIENT
Start: 2018-06-06 | End: 2018-06-06 | Stop reason: HOSPADM

## 2018-06-06 RX ORDER — NITROFURANTOIN 25; 75 MG/1; MG/1
100 CAPSULE ORAL NIGHTLY
Qty: 5 CAPSULE | Refills: 0 | Status: SHIPPED | OUTPATIENT
Start: 2018-06-06 | End: 2018-06-11

## 2018-06-06 RX ORDER — HYDROCODONE BITARTRATE AND ACETAMINOPHEN 5; 325 MG/1; MG/1
1 TABLET ORAL EVERY 6 HOURS PRN
Qty: 30 TABLET | Refills: 0 | Status: SHIPPED | OUTPATIENT
Start: 2018-06-06 | End: 2019-10-25

## 2018-06-06 RX ORDER — IBUPROFEN 600 MG/1
600 TABLET ORAL EVERY 6 HOURS PRN
Status: DISCONTINUED | OUTPATIENT
Start: 2018-06-06 | End: 2018-06-06 | Stop reason: HOSPADM

## 2018-06-06 RX ORDER — IBUPROFEN 600 MG/1
600 TABLET ORAL EVERY 6 HOURS PRN
Qty: 60 TABLET | Refills: 0 | Status: SHIPPED | OUTPATIENT
Start: 2018-06-06 | End: 2020-01-17

## 2018-06-06 RX ORDER — DOCUSATE SODIUM 250 MG
250 CAPSULE ORAL 2 TIMES DAILY PRN
Qty: 30 CAPSULE | Refills: 0 | Status: SHIPPED | OUTPATIENT
Start: 2018-06-06 | End: 2019-10-25

## 2018-06-06 RX ADMIN — IBUPROFEN 600 MG: 600 TABLET ORAL at 06:54

## 2018-06-06 RX ADMIN — HYDROCODONE BITARTRATE AND ACETAMINOPHEN 1 TABLET: 10; 325 TABLET ORAL at 13:18

## 2018-06-06 RX ADMIN — HYDROCODONE BITARTRATE AND ACETAMINOPHEN 1 TABLET: 10; 325 TABLET ORAL at 02:28

## 2018-06-06 RX ADMIN — HYDROCODONE BITARTRATE AND ACETAMINOPHEN 1 TABLET: 10; 325 TABLET ORAL at 08:42

## 2018-06-06 RX ADMIN — IBUPROFEN 600 MG: 600 TABLET ORAL at 14:23

## 2018-06-06 RX ADMIN — SODIUM CHLORIDE 75 ML/HR: 9 INJECTION, SOLUTION INTRAVENOUS at 02:28

## 2018-06-06 NOTE — PLAN OF CARE
Problem: Patient Care Overview  Goal: Plan of Care Review  Outcome: Ongoing (interventions implemented as appropriate)   06/06/18 7584   Coping/Psychosocial   Plan of Care Reviewed With patient;family   Plan of Care Review   Progress improving   OTHER   Outcome Summary VSS. PO pain medication given with relief. No c/o nausea. [place] reg diet. IVF's infusing. Walked in halls x1. F/C to come out at 0600. Currenlty resting well.      Goal: Individualization and Mutuality  Outcome: Ongoing (interventions implemented as appropriate)    Goal: Discharge Needs Assessment  Outcome: Ongoing (interventions implemented as appropriate)    Goal: Interprofessional Rounds/Family Conf  Outcome: Ongoing (interventions implemented as appropriate)      Problem: Hysterectomy (Adult)  Goal: Signs and Symptoms of Listed Potential Problems Will be Absent, Minimized or Managed (Hysterectomy)  Outcome: Ongoing (interventions implemented as appropriate)    Goal: Anesthesia/Sedation Recovery  Outcome: Ongoing (interventions implemented as appropriate)

## 2018-06-06 NOTE — PLAN OF CARE
Problem: Patient Care Overview  Goal: Plan of Care Review  Outcome: Outcome(s) achieved Date Met: 06/06/18 06/06/18 1428   Coping/Psychosocial   Plan of Care Reviewed With patient   OTHER   Outcome Summary VSS voided 400 cc after f/c removal, bladder scan 117cc, good pain control with po meds, no BM but+ bowerl sounds, lap sites dry & intact, using ice pack off & on, shell reg diet, ambulating     Goal: Individualization and Mutuality  Outcome: Outcome(s) achieved Date Met: 06/06/18    Goal: Discharge Needs Assessment  Outcome: Outcome(s) achieved Date Met: 06/06/18    Goal: Interprofessional Rounds/Family Conf  Outcome: Outcome(s) achieved Date Met: 06/06/18      Problem: Hysterectomy (Adult)  Goal: Signs and Symptoms of Listed Potential Problems Will be Absent, Minimized or Managed (Hysterectomy)  Outcome: Outcome(s) achieved Date Met: 06/06/18    Goal: Anesthesia/Sedation Recovery  Outcome: Outcome(s) achieved Date Met: 06/06/18

## 2018-06-06 NOTE — PROGRESS NOTES
Case Management Discharge Note    Final Note: Home with no needs identified. GILDARDO Flores    Destination     No service coordination in this encounter.      Durable Medical Equipment     No service coordination in this encounter.      Dialysis/Infusion     No service coordination in this encounter.      Home Medical Care     No service coordination in this encounter.      Social Care     No service coordination in this encounter.        Other: Other (Private auto)    Final Discharge Disposition Code: 01 - home or self-care

## 2018-08-15 ENCOUNTER — APPOINTMENT (OUTPATIENT)
Dept: LAB | Facility: HOSPITAL | Age: 43
End: 2018-08-15

## 2018-08-15 ENCOUNTER — APPOINTMENT (OUTPATIENT)
Dept: ONCOLOGY | Facility: CLINIC | Age: 43
End: 2018-08-15

## 2018-08-31 RX ORDER — TAMOXIFEN CITRATE 20 MG/1
TABLET ORAL
Qty: 30 TABLET | Refills: 1 | Status: SHIPPED | OUTPATIENT
Start: 2018-08-31 | End: 2018-11-20 | Stop reason: ALTCHOICE

## 2018-09-07 ENCOUNTER — TELEPHONE (OUTPATIENT)
Dept: ONCOLOGY | Facility: HOSPITAL | Age: 43
End: 2018-09-07

## 2018-09-07 DIAGNOSIS — C50.812 MALIGNANT NEOPLASM OF OVERLAPPING SITES OF LEFT BREAST IN FEMALE, ESTROGEN RECEPTOR POSITIVE (HCC): Primary | ICD-10-CM

## 2018-09-07 DIAGNOSIS — Z17.0 MALIGNANT NEOPLASM OF OVERLAPPING SITES OF LEFT BREAST IN FEMALE, ESTROGEN RECEPTOR POSITIVE (HCC): Primary | ICD-10-CM

## 2018-10-01 ENCOUNTER — TRANSCRIBE ORDERS (OUTPATIENT)
Dept: ADMINISTRATIVE | Facility: HOSPITAL | Age: 43
End: 2018-10-01

## 2018-10-01 DIAGNOSIS — Z78.0 MENOPAUSE: Primary | ICD-10-CM

## 2018-10-04 ENCOUNTER — OFFICE VISIT (OUTPATIENT)
Dept: ONCOLOGY | Facility: CLINIC | Age: 43
End: 2018-10-04

## 2018-10-04 ENCOUNTER — LAB (OUTPATIENT)
Dept: LAB | Facility: HOSPITAL | Age: 43
End: 2018-10-04

## 2018-10-04 VITALS
TEMPERATURE: 98.7 F | DIASTOLIC BLOOD PRESSURE: 68 MMHG | WEIGHT: 293 LBS | RESPIRATION RATE: 16 BRPM | BODY MASS INDEX: 48.82 KG/M2 | SYSTOLIC BLOOD PRESSURE: 114 MMHG | HEART RATE: 97 BPM | OXYGEN SATURATION: 97 % | HEIGHT: 65 IN

## 2018-10-04 DIAGNOSIS — D51.8 OTHER VITAMIN B12 DEFICIENCY ANEMIA: ICD-10-CM

## 2018-10-04 DIAGNOSIS — D50.8 OTHER IRON DEFICIENCY ANEMIA: ICD-10-CM

## 2018-10-04 DIAGNOSIS — C50.812 MALIGNANT NEOPLASM OF OVERLAPPING SITES OF LEFT BREAST IN FEMALE, ESTROGEN RECEPTOR POSITIVE (HCC): ICD-10-CM

## 2018-10-04 DIAGNOSIS — E55.9 VITAMIN D DEFICIENCY: ICD-10-CM

## 2018-10-04 DIAGNOSIS — Z17.0 MALIGNANT NEOPLASM OF OVERLAPPING SITES OF LEFT BREAST IN FEMALE, ESTROGEN RECEPTOR POSITIVE (HCC): Primary | ICD-10-CM

## 2018-10-04 DIAGNOSIS — Z17.0 MALIGNANT NEOPLASM OF OVERLAPPING SITES OF LEFT BREAST IN FEMALE, ESTROGEN RECEPTOR POSITIVE (HCC): ICD-10-CM

## 2018-10-04 DIAGNOSIS — R53.82 CHRONIC FATIGUE: ICD-10-CM

## 2018-10-04 DIAGNOSIS — C50.812 MALIGNANT NEOPLASM OF OVERLAPPING SITES OF LEFT BREAST IN FEMALE, ESTROGEN RECEPTOR POSITIVE (HCC): Primary | ICD-10-CM

## 2018-10-04 LAB
25(OH)D3 SERPL-MCNC: 35.6 NG/ML (ref 30–100)
ALBUMIN SERPL-MCNC: 4.3 G/DL (ref 3.5–5.2)
ALBUMIN/GLOB SERPL: 1.5 G/DL (ref 1.1–2.4)
ALP SERPL-CCNC: 81 U/L (ref 38–116)
ALT SERPL W P-5'-P-CCNC: 15 U/L (ref 0–33)
ANION GAP SERPL CALCULATED.3IONS-SCNC: 13.3 MMOL/L
AST SERPL-CCNC: 19 U/L (ref 0–32)
BASOPHILS # BLD AUTO: 0.06 10*3/MM3 (ref 0–0.1)
BASOPHILS NFR BLD AUTO: 0.7 % (ref 0–1.1)
BILIRUB SERPL-MCNC: 0.2 MG/DL (ref 0.1–1.2)
BUN BLD-MCNC: 15 MG/DL (ref 6–20)
BUN/CREAT SERPL: 28.8 (ref 7.3–30)
CALCIUM SPEC-SCNC: 9.3 MG/DL (ref 8.5–10.2)
CHLORIDE SERPL-SCNC: 100 MMOL/L (ref 98–107)
CO2 SERPL-SCNC: 27.7 MMOL/L (ref 22–29)
CREAT BLD-MCNC: 0.52 MG/DL (ref 0.6–1.1)
DEPRECATED RDW RBC AUTO: 41.9 FL (ref 37–49)
EOSINOPHIL # BLD AUTO: 0.54 10*3/MM3 (ref 0–0.36)
EOSINOPHIL NFR BLD AUTO: 6.3 % (ref 1–5)
ERYTHROCYTE [DISTWIDTH] IN BLOOD BY AUTOMATED COUNT: 13.2 % (ref 11.7–14.5)
FERRITIN SERPL-MCNC: 64.3 NG/ML (ref 11–207)
GFR SERPL CREATININE-BSD FRML MDRD: 128 ML/MIN/1.73
GLOBULIN UR ELPH-MCNC: 2.8 GM/DL (ref 1.8–3.5)
GLUCOSE BLD-MCNC: 153 MG/DL (ref 74–124)
HCT VFR BLD AUTO: 38.1 % (ref 34–45)
HGB BLD-MCNC: 12.4 G/DL (ref 11.5–14.9)
IMM GRANULOCYTES # BLD: 0.05 10*3/MM3 (ref 0–0.03)
IMM GRANULOCYTES NFR BLD: 0.6 % (ref 0–0.5)
IRON 24H UR-MRATE: 76 MCG/DL (ref 37–145)
IRON SATN MFR SERPL: 15 % (ref 14–48)
LYMPHOCYTES # BLD AUTO: 1.83 10*3/MM3 (ref 1–3.5)
LYMPHOCYTES NFR BLD AUTO: 21.4 % (ref 20–49)
MCH RBC QN AUTO: 28.4 PG (ref 27–33)
MCHC RBC AUTO-ENTMCNC: 32.5 G/DL (ref 32–35)
MCV RBC AUTO: 87.4 FL (ref 83–97)
MONOCYTES # BLD AUTO: 0.37 10*3/MM3 (ref 0.25–0.8)
MONOCYTES NFR BLD AUTO: 4.3 % (ref 4–12)
NEUTROPHILS # BLD AUTO: 5.72 10*3/MM3 (ref 1.5–7)
NEUTROPHILS NFR BLD AUTO: 66.7 % (ref 39–75)
NRBC BLD MANUAL-RTO: 0 /100 WBC (ref 0–0)
PLATELET # BLD AUTO: 348 10*3/MM3 (ref 150–375)
PMV BLD AUTO: 9.9 FL (ref 8.9–12.1)
POTASSIUM BLD-SCNC: 3.9 MMOL/L (ref 3.5–4.7)
PROT SERPL-MCNC: 7.1 G/DL (ref 6.3–8)
RBC # BLD AUTO: 4.36 10*6/MM3 (ref 3.9–5)
SODIUM BLD-SCNC: 141 MMOL/L (ref 134–145)
T4 FREE SERPL-MCNC: 1.04 NG/DL (ref 0.93–1.7)
TIBC SERPL-MCNC: 521 MCG/DL (ref 249–505)
TRANSFERRIN SERPL-MCNC: 372 MG/DL (ref 200–360)
TSH SERPL DL<=0.05 MIU/L-ACNC: 3.25 MIU/ML (ref 0.27–4.2)
VIT B12 BLD-MCNC: 573 PG/ML (ref 211–946)
WBC NRBC COR # BLD: 8.57 10*3/MM3 (ref 4–10)

## 2018-10-04 PROCEDURE — 36415 COLL VENOUS BLD VENIPUNCTURE: CPT | Performed by: INTERNAL MEDICINE

## 2018-10-04 PROCEDURE — 84443 ASSAY THYROID STIM HORMONE: CPT | Performed by: INTERNAL MEDICINE

## 2018-10-04 PROCEDURE — 83540 ASSAY OF IRON: CPT

## 2018-10-04 PROCEDURE — 80053 COMPREHEN METABOLIC PANEL: CPT

## 2018-10-04 PROCEDURE — 84466 ASSAY OF TRANSFERRIN: CPT

## 2018-10-04 PROCEDURE — 84439 ASSAY OF FREE THYROXINE: CPT | Performed by: INTERNAL MEDICINE

## 2018-10-04 PROCEDURE — 82607 VITAMIN B-12: CPT | Performed by: INTERNAL MEDICINE

## 2018-10-04 PROCEDURE — 85025 COMPLETE CBC W/AUTO DIFF WBC: CPT

## 2018-10-04 PROCEDURE — 99214 OFFICE O/P EST MOD 30 MIN: CPT | Performed by: INTERNAL MEDICINE

## 2018-10-04 PROCEDURE — 82728 ASSAY OF FERRITIN: CPT

## 2018-10-04 PROCEDURE — 82306 VITAMIN D 25 HYDROXY: CPT | Performed by: INTERNAL MEDICINE

## 2018-10-04 RX ORDER — DULAGLUTIDE 0.75 MG/.5ML
INJECTION, SOLUTION SUBCUTANEOUS
COMMUNITY
Start: 2018-10-01 | End: 2020-06-26 | Stop reason: SDDI

## 2018-10-04 NOTE — PROGRESS NOTES
Subjective .     REASONS FOR FOLLOWUP:    1. Stage IIA ([id number]) left breast cancer:  Status post left mastectomy with left axillary lymph node dissection 03/12/2013.  Lesion #1 invasive ductal carcinoma grade 2, 1.5 cm ER positive (90%), MN positive (1-4%), HER-2/jodi FISH negative (ratio 1.4),  Ki-67 score of 8%.  Lesion #2 invasive ductal carcinoma, grade 2, 1.7 cm,  ER positive (70%), MN negative (%),HER-2/jodi FISH positive (ratio of 3.0).  3 of 15 lymph nodes positive (2 macrometastases, 1 micrometastasis, largest lesion 2.5 cm replacing lymph node).  Lymph node evaluated and is ER positive (90%), MN positive (30%), HER-2/jodi FISH negative (ratio of 1.4).    2. Iron deficiency anemia presumed secondary to menstrual blood loss.  Previously treated with iron sulfate 325 mg daily.  Iron discontinued secondary to GI side effects (diarrhea).  3. BRCA-1 and 2 testing negative.  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  4. Participation in the [employer] Clinical trial.   5. Status post Mediport placement by Dr. Neumann 04/08/2013.  Mediport removed by Dr. Mabry 07/21/2014.  6. Initiation of adjuvant chemotherapy 04/16/2013.  Treatment planned:  dose-dense Adriamycin and Cytoxan with Neulasta support q.2 weeks x4 cycles.  Subsequent weekly Taxol and Herceptin x12 weeks.  Subsequent continuation of Herceptin q.3 weeks to complete one year.  Adjuvant endocrine therapy after completion of chemotherapy with tamoxifen (premenopausal).   Adjuvant radiation therapy following completion of chemotherapy.  Last dose of Taxol administered 08/27/2013. Tamoxifen initiated 09/25/2013 (plan to treat for 10 years).   Final dose of Herceptin administered 05/21/2014 (one year of treatment completed 06/11/2014).   7. 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  8. Borderline elevated transaminases. CT abdomen 10/30/2013 with normal appearing liver. Suspected steatohepatitis.    9. Left foot plantar fasciitis with stress fracture of the 4th metatarsal, status post evaluation with bone scan, 09/29/2014 and MRI of the left foot, 10/08/2014. Negative for metastatic disease.    10. Lymphedema of the left upper extremity with compression sleeve in place and having been evaluated in the lymphedema clinic.   11. Left wrist tenosynovitis identified on MRI of 06/18/2015. Status post steroid injection by orthopedics, on 07/18/2015.   12. Anemia with hemoglobin decreased to 10.8 on 3/30/16.  Evaluation revealing evidence of iron deficiency (related to menstrual blood loss) and B12 deficiency.  Initiated oral iron sulfate 325 mg twice a day as well as B12 replacement with weekly injections ×4 and subsequent monthly injections with intended transition to oral B12 1000 µg daily, however, patient did not follow through.  Patient also discontinued oral iron.  Recurrent iron deficiency with oral iron resumed 5/4/17 twice a day dosing, again discontinued due to poor tolerance.  Administration of Feraheme 11/15 and 11/22/17.  13. Vitamin D deficiency continuing on vitamin D 2000 units daily  14. Positive rheumatoid factor 4/28/17 (16.4), referred to rheumatology for further evaluation.  Initiated treatment with Plaquenil per rheumatology.  15. Status post KATE/BSO 6/5/18 (benign pathologic findings).  Consideration of transition to aromatase inhibitor therapy.    HISTORY OF PRESENT ILLNESS:  The patient is a 43 y.o. year old female who is here for follow-up with the above-mentioned history.    History of Present Illness the patient returns today in follow-up with laboratory studies to review, continuing on adjuvant tamoxifen as well as continuing on oral B12.  The patient reports that she has not been taking oral iron for vitamin D supplementation recently.  She had continued difficulty with menorrhagia and ultimately underwent KATE/BSO on 6/5/18 with benign findings identified pathologically.  She did not  have any difficulties postoperatively.  She has continued to struggle with her weight.  She is not experiencing any significant hot flashes currently.  She does note stable arthralgias involving her hands.  She notes a mildly pruritic annular skin lesion around her left axillary fold and is concerned this might be ringworm.  She notes ongoing difficulty with fatigue.  She does continue to use CPAP for her obstructive sleep apnea but has not been evaluated recently.  She has developed diabetes with hemoglobin A1c of 6.8 by her report and is receiving metformin and Trulicity.    PAST MEDICAL HISTORY:    1. Hypertension.  2. History of menorrhagia status post DandC with Dr. Elda Roland 16.  Pathology showed polypoid endometrium with disordered proliferation but no evidence of malignancy.  Menstrual blood loss ceased.  3. History of phentermine use for weight loss.  4. Status post knee surgery after traumatic fracture with hardware in place in .  5. Status post  x2.  6. Status post Mediport placement by Dr. Neumann on 2013. Mediport removed by Dr. Mabry 2014.  7. Left foot plantar fasciitis with stress fracture of the left 4th metatarsal, 2014.   8. Osteopenia.   9. Vitamin D deficiency.   10. Anxiety/depression.  Hospitalization required in 10/2015 due to major depressive disorder with psychotic features.   11. Anemia secondary to iron deficiency (menstrual blood loss) and B12 deficiency.  12. Suspected early rheumatoid arthritis initiated Plaquenil under the direction of Dr. Lopez in rheumatology in May 2017.  Positive rheumatoid factor 16.4 on 17.  13. Status post right carpal tunnel release surgery in 2017  14. Diabetes mellitus  15. Status post KATE/BSO 18    OB-GYN HISTORY:   G3, P3, A0, menarche age 10.  The patient has had regular menstrual cycles, however, has some degree of menorrhagia.  She was on oral contraceptives for 3-4 years in her late teens to  early 20s. Menstrual cycles ceased with the 1st cycle of chemotherapy.     Menstrual cycles resumed in 2015. Endometrial biopsy performed due to endometrial thickening in 2015 and again 16 with benign pathology.  Recurrent severe dysfunctional uterine bleeding in early 2016 causing anemia.  D&C 16-  pathology showed polypoid endometrium with disordered proliferation but no evidence of malignancy.   Past Medical History:   Diagnosis Date   • Anxiety    • Arthritis    • Depression    • H/O Borderline vitamin D deficiency    • H/O Grade 1 peripheral neuropathy     Involving feet.   • H/O Iron deficiency anemia     Presumed secondary to menstrual loss.   • H/O Lymphedema of left upper extremity    • Hx of being hospitalized 10/2015    Due to major depressive disorder with psychotic features.   • Hypertension    • Malignant neoplasm of breast (CMS/HCC) 2013    LEFT SIDE WITH MASTECTOMY (BRCA-1 and 2 negative)   • Menorrhagia    • Osteopenia    • Plantar fasciitis     LEFT FOOT   • Rash     BILATERAL LOWER LEGS CAUSED BY CHEMO   • Sleep apnea     USES CPAP   • Tenosynovitis of left wrist    • Vitamin D deficiency     BORDERLINE     Past Surgical History:   Procedure Laterality Date   •  SECTION  , 2002    x2   • D&C HYSTEROSCOPY N/A 2016    Procedure: DILATATION AND CURETTAGE HYSTEROSCOPY;  Surgeon: Elda Roland MD;  Location: [facility];  Service:    • KNEE SURGERY Right     After traumatic fracture with hardware in place.   • MEDIPORT INSERTION, DOUBLE Right    • MEDIPORT REMOVAL Right 2014    By Dr. Mabry.   • MODIFIED RADICAL MASTECTOMY W/ AXILLARY LYMPH NODE DISSECTION Left 2013   • TOTAL LAPAROSCOPIC HYSTERECTOMY WITH TRANSVAGINAL TAPING Bilateral 2018    Procedure: TOTAL LAPAROSCOPIC HYSTERECTOMY SHA SALPNGO OOPHORECTOMY TENSION FREE VAGINAL TAPING CYSTOSCOPY;  Surgeon: Jocelynn Zimmerman MD;  Location: [facility] OR;  Service: Gynecology      ONCOLOGIC HISTORY:  (History from previous dates can be found in the separate document.)    Current Outpatient Prescriptions on File Prior to Visit   Medication Sig Dispense Refill   • cholecalciferol (VITAMIN D3) 1000 UNITS tablet Take 1,000 Units by mouth daily.     • Cyanocobalamin (B-12) 1000 MCG capsule Take 1 capsule by mouth Daily.     • docusate sodium (COLACE) 250 MG capsule Take 1 capsule by mouth 2 (Two) Times a Day As Needed for Constipation. 30 capsule 0   • escitalopram (LEXAPRO) 20 MG tablet Take 20 mg by mouth daily.     • ferrous sulfate 325 (65 FE) MG tablet Take 325 mg by mouth 2 (two) times a day.     • fluticasone (FLONASE) 50 MCG/ACT nasal spray 1 spray into each nostril As Needed for Allergies.     • HYDROcodone-acetaminophen (NORCO) 5-325 MG per tablet Take 1 tablet by mouth Every 6 (Six) Hours As Needed for Severe Pain . 30 tablet 0   • hydroxychloroquine (PLAQUENIL) 200 MG tablet Take 200 mg by mouth Daily.     • ibuprofen (ADVIL,MOTRIN) 600 MG tablet Take 1 tablet by mouth Every 6 (Six) Hours As Needed for Mild Pain  or Moderate Pain . 60 tablet 0   • Loratadine 10 MG capsule Take 10 mg by mouth Daily.     • LORazepam (ATIVAN) 1 MG tablet Take 1 mg by mouth 3 (three) times a day.     • losartan-hydrochlorothiazide (HYZAAR) 100-25 MG per tablet Take 1 tablet by mouth Every Night.     • metFORMIN (GLUCOPHAGE) 500 MG tablet Take 500 mg by mouth Daily With Breakfast.     • metoprolol succinate XL (TOPROL-XL) 50 MG 24 hr tablet Take 50 mg by mouth Every Night.     • naproxen (NAPROSYN) 500 MG tablet Take 500 mg by mouth As Needed for Moderate Pain .     • tamoxifen (NOLVADEX) 20 MG chemo tablet Take 20 mg by mouth Daily.     • tamoxifen (NOLVADEX) 20 MG chemo tablet TAKE ONE TABLET BY MOUTH DAILY 30 tablet 1   • vitamin D (ERGOCALCIFEROL) 64114 UNITS capsule capsule Take 1 capsule by mouth 1 (One) Time Per Week. For 8 weeks 8 capsule 0     Current Facility-Administered Medications on File  Prior to Visit   Medication Dose Route Frequency Provider Last Rate Last Dose   • cyanocobalamin injection 1,000 mcg  1,000 mcg Intramuscular Once Glenn Lyon Jr., MD           ALLERGIES:     Allergies   Allergen Reactions   • Shellfish-Derived Products GI Intolerance   • Shrimp (Diagnostic) Nausea And Vomiting   • Adhesive Tape Rash       SOCIAL HISTORY:  The patient is  and lives with her  in [place], [place].  She works as a teacher.  She denies history of smoking, denies significant alcohol use.    Social History     Social History   • Marital status:      Spouse name: Jonathan   • Number of children: 3   • Years of education: College     Occupational History   • Teacher [employer]     Worked in marketing in past.     Social History Main Topics   • Smoking status: Never Smoker   • Smokeless tobacco: Never Used   • Alcohol use Yes      Comment: Light (less than 1 per week)   • Drug use: No   • Sexual activity: Defer     Other Topics Concern   • Not on file     Social History Narrative    Enjoys family activities, sports, 4-H leader/[place]. College education.       FAMILY HISTORY:  Maternal grandmother had breast cancer in her 70s; maternal grandmother’s sister had ovarian cancer in her 70s, and another sister had non-Hodgkin's lymphoma in her 70s.  Maternal grandfather had prostate cancer late in life, and his sister had ovarian cancer later in life.  On paternal side there is a history of diabetes mellitus.  A sister has had issues with anemia and easy bruising.  The patient was seen in Genetics Clinic and underwent BRCA-1 and 2 testing prior to surgery which returned negative.      Family History   Problem Relation Age of Onset   • Cancer Other    • Stroke Other    • Diabetes Other    • Hypertension Other    • Other Other         cardiac disorder   • Ovarian cancer Other    • Lymphoma Other         NON-HODGKIN   • Diabetes insipidus Other    • Anemia Other    • Mental illness  Other         Grandfather   • Arthritis Other    • Heart disease Other    • Breast cancer Maternal Grandmother         In her 70s.   • Prostate cancer Maternal Grandfather    • Other Sister         Anemia and easy bruising   • Hypertension Sister    • Hypertension Mother    • Mental illness Mother    • Hypertension Father    • Diabetes Father    • Malig Hyperthermia Neg Hx        Review of Systems   Constitutional: Positive for fatigue. Negative for activity change, appetite change, fever and unexpected weight change.   HENT: Negative for congestion, mouth sores, nosebleeds and voice change.    Respiratory: Negative for cough and shortness of breath.    Cardiovascular: Negative for chest pain, palpitations and leg swelling.   Gastrointestinal: Negative for abdominal distention, abdominal pain, blood in stool, constipation, diarrhea, nausea and vomiting.   Endocrine: Negative for cold intolerance and heat intolerance.   Genitourinary: Negative for difficulty urinating, dysuria, frequency, hematuria and menstrual problem.   Musculoskeletal: Positive for arthralgias. Negative for back pain, joint swelling and myalgias.   Skin: Negative for rash.   Neurological: Negative for dizziness, syncope, weakness, light-headedness, numbness and headaches.   Hematological: Negative for adenopathy. Does not bruise/bleed easily.   Psychiatric/Behavioral: Negative for confusion, dysphoric mood and sleep disturbance. The patient is not nervous/anxious.          Objective      Vitals:    10/04/18 0944   BP: 114/68   Pulse: 97   Resp: 16   Temp: 98.7 °F (37.1 °C)   SpO2: 97%      Current Status 10/4/2018   ECOG score 0   Pain 0/10    Physical Exam   Constitutional: She is oriented to person, place, and time. She appears well-developed and well-nourished.   HENT:   Mouth/Throat: Oropharynx is clear and moist.   Eyes: Conjunctivae are normal.   Neck: No thyromegaly present.   Cardiovascular: Normal rate and regular rhythm.  Exam reveals  no gallop and no friction rub.    No murmur heard.  Pulmonary/Chest: Effort normal and breath sounds normal. No respiratory distress. She has no wheezes.   The right breast is without masses no abnormalities palpated.  Status post left mastectomy with postoperative/postradiation changes noted in the left chest wall.   Abdominal: Soft. Bowel sounds are normal. She exhibits no distension. There is no tenderness.   Musculoskeletal: She exhibits no edema.   Lymphadenopathy:        Head (right side): No submandibular adenopathy present.     She has no cervical adenopathy.     She has no axillary adenopathy.        Right: No inguinal and no supraclavicular adenopathy present.        Left: No inguinal and no supraclavicular adenopathy present.   Neurological: She is alert and oriented to person, place, and time. She displays normal reflexes. No cranial nerve deficit. She exhibits normal muscle tone.   Skin: Skin is warm and dry. No rash noted.   There is a 2 cm annular scaling lesion in the left axillary fold   Psychiatric: She has a normal mood and affect. Her behavior is normal.       RECENT LABS:  Hematology WBC   Date Value Ref Range Status   10/04/2018 8.57 4.00 - 10.00 10*3/mm3 Final     RBC   Date Value Ref Range Status   10/04/2018 4.36 3.90 - 5.00 10*6/mm3 Final     Hemoglobin   Date Value Ref Range Status   10/04/2018 12.4 11.5 - 14.9 g/dL Final     Hematocrit   Date Value Ref Range Status   10/04/2018 38.1 34.0 - 45.0 % Final     Platelets   Date Value Ref Range Status   10/04/2018 348 150 - 375 10*3/mm3 Final        Lab Results   Component Value Date    GLUCOSE 153 (H) 10/04/2018    BUN 15 10/04/2018    CREATININE 0.52 (L) 10/04/2018    EGFRIFNONA 128 10/04/2018    BCR 28.8 10/04/2018    CO2 27.7 10/04/2018    CALCIUM 9.3 10/04/2018    ALBUMIN 4.30 10/04/2018    AST 19 10/04/2018    ALT 15 10/04/2018     Additional labs today with iron 76, ferritin 64.3, iron saturation 15%, TIBC 521, B12 573    I did review  the patient's operative note and pathologic results from 6/5/18.  Will need to clarify regarding procedure performed as there is no mention of oophorectomy however ovaries are noted in the pathologic specimen.    Assessment/Plan     1. Stage IIA ([id number]) left breast cancer:   The patient had 2 primary lesions of the left breast, the first ER positive, PA borderline, HER-2/jodi negative. The second was ER positive, PA negative, HER-2/jodi positive. She had 3 of 15 lymph nodes positive. She received adjuvant dose-dense chemotherapy with AC x4 cycles followed by weekly Taxol x12 doses completed on 08/27/2013. She did receive Herceptin in conjunction with the Taxol and then completed a total of a year with last dose of Herceptin received on 05/21/2014 (3-week dose). She did initiate adjuvant tamoxifen on 09/25/2013 and we anticipate likely a 10-year course of treatment. BRCA-1 and 2 testing negative at time of diagnosis.  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    Patient returns today in follow-up with no clinical evidence of recurrent disease.  Her exam is negative.  She is scheduled for her annual mammogram later this month on 10/22/18 [facility]. She was experiencing continued menorrhagia and underwent KATE/BSO on 6/5/18.  The operative note does not mention that bilateral oophorectomy was performed however bilateral ovaries are noted in the pathologic specimen.  There were benign findings.  We discussed today that assuming her ovaries were removed as it appears pathologically, we can consider transitioning from tamoxifen to an aromatase inhibitor.  There are 2 concerns however regarding this possibility.  One concern is in regards to her bone density which will be discussed further below.  The other is in regards to her pre-existing arthralgias which are fairly significant in her hands at the present time and may worsen on aromatase inhibitor therapy.  We certainly could switch  back to tamoxifen if she could not tolerate aromatase inhibitor treatment.  The patient would like to attempt this.  Therefore I will have her return in 1 month just after her DEXA scan and mammogram were performed and we will further discuss this issue and possibly transition at that point.    2. Depression/anxiety with prior psychotic episode: The patient required hospitalization in the behavioral health unit in October 2015. She has had difficulties with anxiety and depression in the past, however, given her breast cancer diagnosis as well as stressors from her job and family, it appears that these precipitated the event.  She has stabilized on Lexapro at 20 mg per day.   3. Left wrist tenosynovitis: This has improved.  4. Diffuse arthralgias and fatigue with suspected early rheumatoid arthritis: The patient was experiencing diffuse arthralgias as well as pain in her wrists right greater than left.  We did perform an evaluation 4/28/17 with negative MARYSE panel however rheumatoid factor was positive at 16.4.  She was referred to rheumatology Dr. Lopez in May 2017. It appears that the patient was felt to potentially have early autoimmune disorder/rheumatoid arthritis.  She was started on Plaquenil.  Symptoms have stabilized and improved slightly over time.  She does continue routine follow-up with rheumatology.  Concern regarding potential worsening of symptoms on aromatase inhibitor therapy if pursued.  5. Vitamin D deficiency: The patient went off her vitamin D supplement and developed recurrent vitamin D deficiency.  She had been receiving 1000 units per day with a low level on 5/27/16 of 25.2.  She subsequently increased her dose to 2000 units daily and her level improved to 34.1.  She reported compliance with her vitamin D supplementation and level on 4/28/17 was low at 29.8.  Therefore, she was transitioned to 50,000 units weekly for 8 weeks in early May 2017.  A repeat 25-hydroxy vitamin D level on 7/14/17  was 34.9.  The patient was asked to transition back to a 2000 unit daily.  Repeat 25-hydroxy vitamin D level on 2/28/18 was normal at 34.9.  For unclear reasons, the patient has gone off of vitamin D supplementation.  Her level today remains normal fortunately at 35.6.  I have asked her to resume dosing at 2000 units daily.  6. Hot flashes secondary to tamoxifen: The patient is not requiring any pharmacologic therapy for this.  Her symptoms are significantly improved.   7. Endometrial thickening with dysfunctional uterine bleeding on tamoxifen: The patient underwent endometrial biopsy in October 2015 and again in 1/18/16 with negative findings.  She then experienced a significant degree of menstrual blood loss in early March 2016 to the point where she became anemic.  She did undergo a D and C with Dr. Roland on 4/13/16.  Pathology showed a polypoid endometrium with disordered proliferation but no evidence of malignancy.  She did see her gynecologist in January 2017 and they had discussed a possible laparoscopic assisted vaginal hysterectomy once she loses some more weight.  The patient had 2 profuse menstrual cycles, one in April and the next in September 2017.  She had subsequent recurrence of menorrhagia and ultimately underwent KATE/BSO on 6/5/18.  Pathologic findings were benign.  8. Anemia: The patient developed significant anemia with a hemoglobin down to 10.8 on 3/30/16.  Evaluation revealed iron deficiency with a ferritin of 12.5 iron percent saturation of 5 and a TIBC 535.  She started oral iron sulfate 325 mg twice a day and was tolerating this very well.  Her hemoglobin improved into the 12 range.  Her iron studies showed a significant improvement previously in October 2016 with ferritin up to 54, iron saturation 16%.  She was asked to continue on oral iron twice a day however she did not do so.  The patient also was found to have B12 deficiency with a level of 187 on 3/30/16.  She started B12 replacement  and received weekly dosing ×4 followed bimonthly injections, last received 7/22/16.  As it was difficult for her to come in to the office for B12 injections, she was asked to begin oral B12 1000 µg daily however she did not do this.  On 4/28/17, she was mildly anemic with a hemoglobin of 11.4.  She had a thrombocytosis as well, related to iron deficiency.  Repeat iron studies 4/28/17 showed a ferritin that declined to 42, iron saturation low at 7%, elevated TIBC at 491. She was asked to resume oral iron twice daily.  B12 level 4/28/17 had remained in the low normal range at 370 and was monitored.  On 7/14/17, B12 level was again low normal at 299 if she was asked to resume oral B12 however she did not do so.  She was intolerant of oral iron with diarrhea and abdominal cramping.  Labs on 11/8/17 showed a declining iron saturation 9% and a slight decline in ferritin down to 39 with a TIBC at the 501.  Her hemoglobin was up to 12.8.  Oral iron was discontinued 11/8/17 and she proceeded on with Feraheme on 11/15/17 and 11/22/17.  She also resumed oral B12 1000 µg daily.  The patient had recurrent menorrhagia but is now status post KATE/BSO on 6/5/18.  Hemoglobin today is improved at 12.4.  Iron studies show an acceptable ferritin at 64.3, iron saturation low normal at 15% however elevated TIBC at 521.  We will continue to monitor her iron studies for now however hopefully they will not decline significantly further without continued menstrual blood loss.  She continues taking her B12 supplement and B12 level was acceptable today at 573.  9. Elevated transaminases: The patient has experienced this in the past with negative hepatic imaging.  I suspect that she has underlying steatohepatitis.  Current LFTs are normal.  10. Left upper extremity lymphedema: The patient does wear her sleeve intermittently, is not wearing it today.   11. Osteopenia: Previous DEXA scan 3/5/14 osteopenia with maximal T score of -1.1 in the lumbar  spine.  We are considering aromatase inhibitor treatment and we will proceed with DEXA scan prior to her return visit in 1 month.  Apparently has already been ordered by her primary care physician.  12. Tinea corporis: The patient has an area of apparent ringworm around her left axillary fold measuring around 2 cm, annular with central scaling.  I have sent a prescription for miconazole 1% cream every 12 hours.  13. Chronic fatigue: The patient notes that this has worsened recently.  I have asked her to follow-up with her sleep medicine physician regarding assessment of her CPAP.  We will check TSH and free T4 today.    PLAN:    1. Continue adjuvant tamoxifen 20 mg daily  2. Resume vitamin D supplementation 2000 units daily  3. Continue oral B12 1000 µg daily.  4. Additional labs today with TSH and free T4  5. Prescription sent for miconazole cream 1% every 12 hours  6. DEXA scan as scheduled in the next 1-2 weeks  7. [facility]  8. M.D. visit in 1 month with CBC, CMP.  We will discuss at that time pending her DEXA scan results regarding potential transition from tamoxifen to aromatase inhibitor therapy.

## 2018-10-12 ENCOUNTER — HOSPITAL ENCOUNTER (OUTPATIENT)
Dept: BONE DENSITY | Facility: HOSPITAL | Age: 43
Discharge: HOME | End: 2018-10-12
Admitting: NURSE PRACTITIONER

## 2018-10-12 DIAGNOSIS — Z78.0 MENOPAUSE: ICD-10-CM

## 2018-10-12 PROCEDURE — 77080 DXA BONE DENSITY AXIAL: CPT

## 2018-10-22 ENCOUNTER — APPOINTMENT (OUTPATIENT)
Dept: WOMENS IMAGING | Facility: HOSPITAL | Age: 43
End: 2018-10-22

## 2018-10-22 PROCEDURE — 77063 BREAST TOMOSYNTHESIS BI: CPT | Performed by: RADIOLOGY

## 2018-10-22 PROCEDURE — 77067 SCR MAMMO BI INCL CAD: CPT | Performed by: RADIOLOGY

## 2018-11-06 ENCOUNTER — APPOINTMENT (OUTPATIENT)
Dept: LAB | Facility: HOSPITAL | Age: 43
End: 2018-11-06

## 2018-11-06 ENCOUNTER — APPOINTMENT (OUTPATIENT)
Dept: ONCOLOGY | Facility: CLINIC | Age: 43
End: 2018-11-06

## 2018-11-20 ENCOUNTER — LAB (OUTPATIENT)
Dept: LAB | Facility: HOSPITAL | Age: 43
End: 2018-11-20

## 2018-11-20 ENCOUNTER — OFFICE VISIT (OUTPATIENT)
Dept: ONCOLOGY | Facility: CLINIC | Age: 43
End: 2018-11-20

## 2018-11-20 VITALS
RESPIRATION RATE: 18 BRPM | OXYGEN SATURATION: 95 % | BODY MASS INDEX: 50.54 KG/M2 | SYSTOLIC BLOOD PRESSURE: 123 MMHG | HEART RATE: 103 BPM | DIASTOLIC BLOOD PRESSURE: 77 MMHG | WEIGHT: 293 LBS | TEMPERATURE: 98.8 F

## 2018-11-20 DIAGNOSIS — E55.9 VITAMIN D DEFICIENCY: ICD-10-CM

## 2018-11-20 DIAGNOSIS — R53.82 CHRONIC FATIGUE: ICD-10-CM

## 2018-11-20 DIAGNOSIS — D50.8 OTHER IRON DEFICIENCY ANEMIA: ICD-10-CM

## 2018-11-20 DIAGNOSIS — C50.812 MALIGNANT NEOPLASM OF OVERLAPPING SITES OF LEFT BREAST IN FEMALE, ESTROGEN RECEPTOR POSITIVE (HCC): Primary | ICD-10-CM

## 2018-11-20 DIAGNOSIS — C50.812 MALIGNANT NEOPLASM OF OVERLAPPING SITES OF LEFT BREAST IN FEMALE, ESTROGEN RECEPTOR POSITIVE (HCC): ICD-10-CM

## 2018-11-20 DIAGNOSIS — Z17.0 MALIGNANT NEOPLASM OF OVERLAPPING SITES OF LEFT BREAST IN FEMALE, ESTROGEN RECEPTOR POSITIVE (HCC): ICD-10-CM

## 2018-11-20 DIAGNOSIS — Z17.0 MALIGNANT NEOPLASM OF OVERLAPPING SITES OF LEFT BREAST IN FEMALE, ESTROGEN RECEPTOR POSITIVE (HCC): Primary | ICD-10-CM

## 2018-11-20 DIAGNOSIS — D51.8 OTHER VITAMIN B12 DEFICIENCY ANEMIA: ICD-10-CM

## 2018-11-20 LAB
ALBUMIN SERPL-MCNC: 4.1 G/DL (ref 3.5–5.2)
ALBUMIN/GLOB SERPL: 1.3 G/DL (ref 1.1–2.4)
ALP SERPL-CCNC: 88 U/L (ref 38–116)
ALT SERPL W P-5'-P-CCNC: 17 U/L (ref 0–33)
ANION GAP SERPL CALCULATED.3IONS-SCNC: 14 MMOL/L
AST SERPL-CCNC: 17 U/L (ref 0–32)
BASOPHILS # BLD AUTO: 0.06 10*3/MM3 (ref 0–0.1)
BASOPHILS NFR BLD AUTO: 0.7 % (ref 0–1.1)
BILIRUB SERPL-MCNC: <0.2 MG/DL (ref 0.1–1.2)
BUN BLD-MCNC: 19 MG/DL (ref 6–20)
BUN/CREAT SERPL: 27.5 (ref 7.3–30)
CALCIUM SPEC-SCNC: 9.5 MG/DL (ref 8.5–10.2)
CHLORIDE SERPL-SCNC: 99 MMOL/L (ref 98–107)
CO2 SERPL-SCNC: 27 MMOL/L (ref 22–29)
CREAT BLD-MCNC: 0.69 MG/DL (ref 0.6–1.1)
DEPRECATED RDW RBC AUTO: 44.1 FL (ref 37–49)
EOSINOPHIL # BLD AUTO: 0.37 10*3/MM3 (ref 0–0.36)
EOSINOPHIL NFR BLD AUTO: 4.1 % (ref 1–5)
ERYTHROCYTE [DISTWIDTH] IN BLOOD BY AUTOMATED COUNT: 13.4 % (ref 11.7–14.5)
GFR SERPL CREATININE-BSD FRML MDRD: 92 ML/MIN/1.73
GLOBULIN UR ELPH-MCNC: 3.1 GM/DL (ref 1.8–3.5)
GLUCOSE BLD-MCNC: 144 MG/DL (ref 74–124)
HCT VFR BLD AUTO: 37.7 % (ref 34–45)
HGB BLD-MCNC: 12 G/DL (ref 11.5–14.9)
IMM GRANULOCYTES # BLD: 0.06 10*3/MM3 (ref 0–0.03)
IMM GRANULOCYTES NFR BLD: 0.7 % (ref 0–0.5)
LYMPHOCYTES # BLD AUTO: 2.49 10*3/MM3 (ref 1–3.5)
LYMPHOCYTES NFR BLD AUTO: 27.3 % (ref 20–49)
MCH RBC QN AUTO: 28.6 PG (ref 27–33)
MCHC RBC AUTO-ENTMCNC: 31.8 G/DL (ref 32–35)
MCV RBC AUTO: 89.8 FL (ref 83–97)
MONOCYTES # BLD AUTO: 0.48 10*3/MM3 (ref 0.25–0.8)
MONOCYTES NFR BLD AUTO: 5.3 % (ref 4–12)
NEUTROPHILS # BLD AUTO: 5.65 10*3/MM3 (ref 1.5–7)
NEUTROPHILS NFR BLD AUTO: 61.9 % (ref 39–75)
NRBC BLD MANUAL-RTO: 0 /100 WBC (ref 0–0)
PLATELET # BLD AUTO: 359 10*3/MM3 (ref 150–375)
PMV BLD AUTO: 9.8 FL (ref 8.9–12.1)
POTASSIUM BLD-SCNC: 4 MMOL/L (ref 3.5–4.7)
PROT SERPL-MCNC: 7.2 G/DL (ref 6.3–8)
RBC # BLD AUTO: 4.2 10*6/MM3 (ref 3.9–5)
SODIUM BLD-SCNC: 140 MMOL/L (ref 134–145)
WBC NRBC COR # BLD: 9.11 10*3/MM3 (ref 4–10)

## 2018-11-20 PROCEDURE — 85025 COMPLETE CBC W/AUTO DIFF WBC: CPT | Performed by: INTERNAL MEDICINE

## 2018-11-20 PROCEDURE — 36415 COLL VENOUS BLD VENIPUNCTURE: CPT | Performed by: INTERNAL MEDICINE

## 2018-11-20 PROCEDURE — 99214 OFFICE O/P EST MOD 30 MIN: CPT | Performed by: INTERNAL MEDICINE

## 2018-11-20 PROCEDURE — 80053 COMPREHEN METABOLIC PANEL: CPT | Performed by: INTERNAL MEDICINE

## 2018-11-20 RX ORDER — ANASTROZOLE 1 MG/1
1 TABLET ORAL DAILY
Qty: 30 TABLET | Refills: 5 | Status: SHIPPED | OUTPATIENT
Start: 2018-11-20 | End: 2019-09-16 | Stop reason: SDUPTHER

## 2019-01-15 ENCOUNTER — APPOINTMENT (OUTPATIENT)
Dept: ONCOLOGY | Facility: CLINIC | Age: 44
End: 2019-01-15

## 2019-01-15 ENCOUNTER — APPOINTMENT (OUTPATIENT)
Dept: LAB | Facility: HOSPITAL | Age: 44
End: 2019-01-15

## 2019-02-06 ENCOUNTER — LAB (OUTPATIENT)
Dept: LAB | Facility: HOSPITAL | Age: 44
End: 2019-02-06

## 2019-02-06 ENCOUNTER — OFFICE VISIT (OUTPATIENT)
Dept: ONCOLOGY | Facility: CLINIC | Age: 44
End: 2019-02-06

## 2019-02-06 VITALS
TEMPERATURE: 98.1 F | SYSTOLIC BLOOD PRESSURE: 145 MMHG | RESPIRATION RATE: 18 BRPM | DIASTOLIC BLOOD PRESSURE: 76 MMHG | WEIGHT: 293 LBS | HEIGHT: 65 IN | HEART RATE: 115 BPM | OXYGEN SATURATION: 96 % | BODY MASS INDEX: 48.82 KG/M2

## 2019-02-06 DIAGNOSIS — C50.812 MALIGNANT NEOPLASM OF OVERLAPPING SITES OF LEFT BREAST IN FEMALE, ESTROGEN RECEPTOR POSITIVE (HCC): ICD-10-CM

## 2019-02-06 DIAGNOSIS — Z17.0 MALIGNANT NEOPLASM OF OVERLAPPING SITES OF LEFT BREAST IN FEMALE, ESTROGEN RECEPTOR POSITIVE (HCC): ICD-10-CM

## 2019-02-06 DIAGNOSIS — E55.9 VITAMIN D DEFICIENCY: ICD-10-CM

## 2019-02-06 DIAGNOSIS — D51.8 OTHER VITAMIN B12 DEFICIENCY ANEMIA: ICD-10-CM

## 2019-02-06 DIAGNOSIS — D50.8 OTHER IRON DEFICIENCY ANEMIA: ICD-10-CM

## 2019-02-06 DIAGNOSIS — D51.8 OTHER VITAMIN B12 DEFICIENCY ANEMIA: Primary | ICD-10-CM

## 2019-02-06 LAB
ALBUMIN SERPL-MCNC: 4.3 G/DL (ref 3.5–5.2)
ALBUMIN/GLOB SERPL: 1.3 G/DL (ref 1.1–2.4)
ALP SERPL-CCNC: 87 U/L (ref 38–116)
ALT SERPL W P-5'-P-CCNC: 24 U/L (ref 0–33)
ANION GAP SERPL CALCULATED.3IONS-SCNC: 15.2 MMOL/L
AST SERPL-CCNC: 25 U/L (ref 0–32)
BASOPHILS # BLD AUTO: 0.05 10*3/MM3 (ref 0–0.1)
BASOPHILS NFR BLD AUTO: 0.5 % (ref 0–1.1)
BILIRUB SERPL-MCNC: <0.2 MG/DL (ref 0.1–1.2)
BUN BLD-MCNC: 14 MG/DL (ref 6–20)
BUN/CREAT SERPL: 24.6 (ref 7.3–30)
CALCIUM SPEC-SCNC: 9.3 MG/DL (ref 8.5–10.2)
CHLORIDE SERPL-SCNC: 103 MMOL/L (ref 98–107)
CO2 SERPL-SCNC: 22.8 MMOL/L (ref 22–29)
CREAT BLD-MCNC: 0.57 MG/DL (ref 0.6–1.1)
DEPRECATED RDW RBC AUTO: 43.2 FL (ref 37–49)
EOSINOPHIL # BLD AUTO: 0.36 10*3/MM3 (ref 0–0.36)
EOSINOPHIL NFR BLD AUTO: 3.8 % (ref 1–5)
ERYTHROCYTE [DISTWIDTH] IN BLOOD BY AUTOMATED COUNT: 13.2 % (ref 11.7–14.5)
GFR SERPL CREATININE-BSD FRML MDRD: 115 ML/MIN/1.73
GLOBULIN UR ELPH-MCNC: 3.2 GM/DL (ref 1.8–3.5)
GLUCOSE BLD-MCNC: 168 MG/DL (ref 74–124)
HCT VFR BLD AUTO: 38.4 % (ref 34–45)
HGB BLD-MCNC: 12.2 G/DL (ref 11.5–14.9)
IMM GRANULOCYTES # BLD AUTO: 0.07 10*3/MM3 (ref 0–0.03)
IMM GRANULOCYTES NFR BLD AUTO: 0.7 % (ref 0–0.5)
LYMPHOCYTES # BLD AUTO: 2.54 10*3/MM3 (ref 1–3.5)
LYMPHOCYTES NFR BLD AUTO: 26.6 % (ref 20–49)
MCH RBC QN AUTO: 28.4 PG (ref 27–33)
MCHC RBC AUTO-ENTMCNC: 31.8 G/DL (ref 32–35)
MCV RBC AUTO: 89.5 FL (ref 83–97)
MONOCYTES # BLD AUTO: 0.39 10*3/MM3 (ref 0.25–0.8)
MONOCYTES NFR BLD AUTO: 4.1 % (ref 4–12)
NEUTROPHILS # BLD AUTO: 6.14 10*3/MM3 (ref 1.5–7)
NEUTROPHILS NFR BLD AUTO: 64.3 % (ref 39–75)
NRBC BLD AUTO-RTO: 0 /100 WBC (ref 0–0)
PLATELET # BLD AUTO: 358 10*3/MM3 (ref 150–375)
PMV BLD AUTO: 9.6 FL (ref 8.9–12.1)
POTASSIUM BLD-SCNC: 3.9 MMOL/L (ref 3.5–4.7)
PROT SERPL-MCNC: 7.5 G/DL (ref 6.3–8)
RBC # BLD AUTO: 4.29 10*6/MM3 (ref 3.9–5)
SODIUM BLD-SCNC: 141 MMOL/L (ref 134–145)
WBC NRBC COR # BLD: 9.55 10*3/MM3 (ref 4–10)

## 2019-02-06 PROCEDURE — 80053 COMPREHEN METABOLIC PANEL: CPT | Performed by: INTERNAL MEDICINE

## 2019-02-06 PROCEDURE — 99214 OFFICE O/P EST MOD 30 MIN: CPT | Performed by: INTERNAL MEDICINE

## 2019-02-06 PROCEDURE — 36415 COLL VENOUS BLD VENIPUNCTURE: CPT | Performed by: INTERNAL MEDICINE

## 2019-02-06 PROCEDURE — 85025 COMPLETE CBC W/AUTO DIFF WBC: CPT | Performed by: INTERNAL MEDICINE

## 2019-02-06 RX ORDER — ANASTROZOLE 1 MG/1
TABLET ORAL DAILY
COMMUNITY
End: 2019-10-25 | Stop reason: SDUPTHER

## 2019-02-06 NOTE — PROGRESS NOTES
Subjective .     REASONS FOR FOLLOWUP:    1. Stage IIA ([id number]) left breast cancer:  Status post left mastectomy with left axillary lymph node dissection 03/12/2013.  Lesion #1 invasive ductal carcinoma grade 2, 1.5 cm ER positive (90%), AR positive (1-4%), HER-2/jodi FISH negative (ratio 1.4),  Ki-67 score of 8%.  Lesion #2 invasive ductal carcinoma, grade 2, 1.7 cm,  ER positive (70%), AR negative (%),HER-2/jodi FISH positive (ratio of 3.0).  3 of 15 lymph nodes positive (2 macrometastases, 1 micrometastasis, largest lesion 2.5 cm replacing lymph node).  Lymph node evaluated and is ER positive (90%), AR positive (30%), HER-2/joid FISH negative (ratio of 1.4).    2. Iron deficiency anemia presumed secondary to menstrual blood loss.  Previously treated with iron sulfate 325 mg daily.  Iron discontinued secondary to GI side effects (diarrhea).  3. BRCA-1 and 2 testing negative.  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  4. Participation in the [employer] Clinical trial.   5. Status post Mediport placement by Dr. Neumann 04/08/2013.  Mediport removed by Dr. Mabry 07/21/2014.  6. Initiation of adjuvant chemotherapy 04/16/2013.  Treatment planned:  dose-dense Adriamycin and Cytoxan with Neulasta support q.2 weeks x4 cycles.  Subsequent weekly Taxol and Herceptin x12 weeks.  Subsequent continuation of Herceptin q.3 weeks to complete one year.  Adjuvant endocrine therapy after completion of chemotherapy with tamoxifen (premenopausal).   Adjuvant radiation therapy following completion of chemotherapy.  Last dose of Taxol administered 08/27/2013. Tamoxifen initiated 09/25/2013 (plan to treat for 10 years).   Final dose of Herceptin administered 05/21/2014 (one year of treatment completed 06/11/2014).   7. 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  8. Borderline elevated transaminases. CT abdomen 10/30/2013 with normal appearing liver. Suspected steatohepatitis.    9. Left foot plantar fasciitis with stress fracture of the 4th metatarsal, status post evaluation with bone scan, 09/29/2014 and MRI of the left foot, 10/08/2014. Negative for metastatic disease.    10. Lymphedema of the left upper extremity with compression sleeve in place and having been evaluated in the lymphedema clinic.   11. Left wrist tenosynovitis identified on MRI of 06/18/2015. Status post steroid injection by orthopedics, on 07/18/2015.   12. Anemia with hemoglobin decreased to 10.8 on 3/30/16.  Evaluation revealing evidence of iron deficiency (related to menstrual blood loss) and B12 deficiency.  Initiated oral iron sulfate 325 mg twice a day as well as B12 replacement with weekly injections ×4 and subsequent monthly injections with intended transition to oral B12 1000 µg daily, however, patient did not follow through.  Patient also discontinued oral iron.  Recurrent iron deficiency with oral iron resumed 5/4/17 twice a day dosing, again discontinued due to poor tolerance.  Administration of Feraheme 11/15 and 11/22/17.  13. Vitamin D deficiency continuing on vitamin D 2000 units daily  14. Positive rheumatoid factor 4/28/17 (16.4), referred to rheumatology for further evaluation.  Initiated treatment with Plaquenil per rheumatology.  15. Status post KATE/BSO 6/5/18 (benign pathologic findings).  Transition from tamoxifen to adjuvant Arimidex 11/20/18 due to surgical postmenopausal state.  16. Osteopenia: DEXA scan 10/12/18 with maximal T score -1.4 in the lumbar spine, monitor annually on aromatase inhibitor therapy.  Calcium and vitamin D supplementation.  Normal 25-hydroxy vitamin D level 10/4/18 at 35.6.    HISTORY OF PRESENT ILLNESS:  The patient is a 43 y.o. year old female who is here for follow-up with the above-mentioned history.    History of Present Illness the patient returns today continuing on adjuvant Arimidex 1 mg daily.  She transitioned to Arimidex from tamoxifen on 11/21/18.  She has  tolerated Arimidex extremely well with no significant side effects.  Her arthralgias are stable to improved in relation to her underlying rheumatoid arthritis.  Since she came off of tamoxifen, or Plaquenil dose was increased by her rheumatologist up to 400 mg daily.  The patient reports that she has been taking her calcium and vitamin D supplement, one over-the-counter pill per day.  She has not been taking her B12.  She does note that the tenia infection in her left axilla has improved with antifungal cream however she has not been consistent in using it and it does persist to a minor extent.    PAST MEDICAL HISTORY:    1. Hypertension.  2. History of menorrhagia status post [facility] with Dr. Elda Roland 16.  Pathology showed polypoid endometrium with disordered proliferation but no evidence of malignancy.  Menstrual blood loss ceased.  3. History of phentermine use for weight loss.  4. Status post knee surgery after traumatic fracture with hardware in place in .  5. Status post  x2.  6. Status post Mediport placement by Dr. Neumann on 2013. Mediport removed by Dr. Mabry 2014.  7. Left foot plantar fasciitis with stress fracture of the left 4th metatarsal, 2014.   8. Osteopenia.   9. Vitamin D deficiency.   10. Anxiety/depression.  Hospitalization required in 10/2015 due to major depressive disorder with psychotic features.   11. Anemia secondary to iron deficiency (menstrual blood loss) and B12 deficiency.  12. Suspected early rheumatoid arthritis initiated Plaquenil under the direction of Dr. Lopez in rheumatology in May 2017.  Positive rheumatoid factor 16.4 on 17.  13. Status post right carpal tunnel release surgery in 2017  14. Diabetes mellitus  15. Status post KATE/BSO 18    OB-GYN HISTORY:   G3, P3, A0, menarche age 10.  The patient has had regular menstrual cycles, however, has some degree of menorrhagia.  She was on oral contraceptives for 3-4 years in  her late teens to early 20s. Menstrual cycles ceased with the 1st cycle of chemotherapy.     Menstrual cycles resumed in 2015. Endometrial biopsy performed due to endometrial thickening in 2015 and again 16 with benign pathology.  Recurrent severe dysfunctional uterine bleeding in early 2016 causing anemia.  D&C 16-  pathology showed polypoid endometrium with disordered proliferation but no evidence of malignancy.   Past Medical History:   Diagnosis Date   • Anxiety    • Arthritis    • Depression    • H/O Borderline vitamin D deficiency    • H/O Grade 1 peripheral neuropathy     Involving feet.   • H/O Iron deficiency anemia     Presumed secondary to menstrual loss.   • H/O Lymphedema of left upper extremity    • Hx of being hospitalized 10/2015    Due to major depressive disorder with psychotic features.   • Hypertension    • Malignant neoplasm of breast (CMS/HCC) 2013    LEFT SIDE WITH MASTECTOMY (BRCA-1 and 2 negative)   • Menorrhagia    • Osteopenia    • Plantar fasciitis     LEFT FOOT   • Rash     BILATERAL LOWER LEGS CAUSED BY CHEMO   • Sleep apnea     USES CPAP   • Tenosynovitis of left wrist    • Vitamin D deficiency     BORDERLINE     Past Surgical History:   Procedure Laterality Date   •  SECTION  , 2002    x2   • D&C HYSTEROSCOPY N/A 2016    Procedure: DILATATION AND CURETTAGE HYSTEROSCOPY;  Surgeon: Elda Roland MD;  Location: [facility], [place], operated by [facility];  Service:    • KNEE SURGERY Right     After traumatic fracture with hardware in place.   • MEDIPORT INSERTION, DOUBLE Right    • MEDIPORT REMOVAL Right 2014    By Dr. Mabry.   • MODIFIED RADICAL MASTECTOMY W/ AXILLARY LYMPH NODE DISSECTION Left 2013   • TOTAL LAPAROSCOPIC HYSTERECTOMY WITH TRANSVAGINAL TAPING Bilateral 2018    Procedure: TOTAL LAPAROSCOPIC HYSTERECTOMY SHA SALPNGO OOPHORECTOMY TENSION FREE VAGINAL TAPING CYSTOSCOPY;  Surgeon: Jocelynn Zimmerman MD;  Location: [facility] OR;   Service: Gynecology     ONCOLOGIC HISTORY:  (History from previous dates can be found in the separate document.)    Current Outpatient Medications on File Prior to Visit   Medication Sig Dispense Refill   • anastrozole (ARIMIDEX) 1 MG tablet Take  by mouth Daily.     • cholecalciferol (VITAMIN D3) 1000 UNITS tablet Take 1,000 Units by mouth daily.     • Cyanocobalamin (B-12) 1000 MCG capsule Take 1 capsule by mouth Daily.     • escitalopram (LEXAPRO) 20 MG tablet Take 20 mg by mouth daily.     • hydroxychloroquine (PLAQUENIL) 200 MG tablet Take 400 mg by mouth Daily.     • ibuprofen (ADVIL,MOTRIN) 600 MG tablet Take 1 tablet by mouth Every 6 (Six) Hours As Needed for Mild Pain  or Moderate Pain . 60 tablet 0   • Loratadine 10 MG capsule Take 10 mg by mouth Daily.     • LORazepam (ATIVAN) 1 MG tablet Take 1 mg by mouth 3 (three) times a day.     • losartan-hydrochlorothiazide (HYZAAR) 100-25 MG per tablet Take 1 tablet by mouth Every Night.     • metFORMIN (GLUCOPHAGE) 500 MG tablet Take 500 mg by mouth Daily With Breakfast.     • metoprolol succinate XL (TOPROL-XL) 50 MG 24 hr tablet Take 50 mg by mouth Every Night.     • miconazole (MICONAZOLE ANTIFUNGAL) 2 % cream Apply  topically to the appropriate area as directed Every 12 (Twelve) Hours. 35 g 1   • TRULICITY 0.75 MG/0.5ML solution pen-injector      • docusate sodium (COLACE) 250 MG capsule Take 1 capsule by mouth 2 (Two) Times a Day As Needed for Constipation. 30 capsule 0   • ferrous sulfate 325 (65 FE) MG tablet Take 325 mg by mouth 2 (two) times a day.     • fluticasone (FLONASE) 50 MCG/ACT nasal spray 1 spray into each nostril As Needed for Allergies.     • HYDROcodone-acetaminophen (NORCO) 5-325 MG per tablet Take 1 tablet by mouth Every 6 (Six) Hours As Needed for Severe Pain . 30 tablet 0   • naproxen (NAPROSYN) 500 MG tablet Take 500 mg by mouth As Needed for Moderate Pain .     • vitamin D (ERGOCALCIFEROL) 39691 UNITS capsule capsule Take 1 capsule by  mouth 1 (One) Time Per Week. For 8 weeks 8 capsule 0     Current Facility-Administered Medications on File Prior to Visit   Medication Dose Route Frequency Provider Last Rate Last Dose   • cyanocobalamin injection 1,000 mcg  1,000 mcg Intramuscular Once Glenn Lyon Jr., MD           ALLERGIES:     Allergies   Allergen Reactions   • Shellfish-Derived Products GI Intolerance   • Shrimp (Diagnostic) Nausea And Vomiting   • Adhesive Tape Rash       SOCIAL HISTORY:  The patient is  and lives with her  in [place], [place].  She works as a teacher.  She denies history of smoking, denies significant alcohol use.    Social History     Socioeconomic History   • Marital status:      Spouse name: Jonathan   • Number of children: 3   • Years of education: College   • Highest education level: Not on file   Social Needs   • Financial resource strain: Not on file   • Food insecurity - worry: Not on file   • Food insecurity - inability: Not on file   • Transportation needs - medical: Not on file   • Transportation needs - non-medical: Not on file   Occupational History   • Occupation: Teacher     Employer: [employer]     Comment: Worked in marketing in past.   Tobacco Use   • Smoking status: Never Smoker   • Smokeless tobacco: Never Used   Substance and Sexual Activity   • Alcohol use: Yes     Comment: Light (less than 1 per week)   • Drug use: No   • Sexual activity: Defer   Other Topics Concern   • Not on file   Social History Narrative    Enjoys family activities, sports, 4-H leader/[employer]. College education.       FAMILY HISTORY:  Maternal grandmother had breast cancer in her 70s; maternal grandmother’s sister had ovarian cancer in her 70s, and another sister had non-Hodgkin's lymphoma in her 70s.  Maternal grandfather had prostate cancer late in life, and his sister had ovarian cancer later in life.  On paternal side there is a history of diabetes mellitus.  A sister has had issues with anemia and  easy bruising.  The patient was seen in Genetics Clinic and underwent BRCA-1 and 2 testing prior to surgery which returned negative.      Family History   Problem Relation Age of Onset   • Cancer Other    • Stroke Other    • Diabetes Other    • Hypertension Other    • Other Other         cardiac disorder   • Ovarian cancer Other    • Lymphoma Other         NON-HODGKIN   • Diabetes insipidus Other    • Anemia Other    • Mental illness Other         Grandfather   • Arthritis Other    • Heart disease Other    • Breast cancer Maternal Grandmother         In her 70s.   • Prostate cancer Maternal Grandfather    • Other Sister         Anemia and easy bruising   • Hypertension Sister    • Hypertension Mother    • Mental illness Mother    • Hypertension Father    • Diabetes Father    • Malig Hyperthermia Neg Hx        Review of Systems   Constitutional: Positive for fatigue. Negative for activity change, appetite change, fever and unexpected weight change.   HENT: Positive for congestion. Negative for mouth sores, nosebleeds and voice change.    Respiratory: Negative for cough and shortness of breath.    Cardiovascular: Negative for chest pain, palpitations and leg swelling.   Gastrointestinal: Negative for abdominal distention, abdominal pain, blood in stool, constipation, diarrhea, nausea and vomiting.   Endocrine: Negative for cold intolerance and heat intolerance.   Genitourinary: Negative for difficulty urinating, dysuria, frequency, hematuria and menstrual problem.   Musculoskeletal: Positive for arthralgias. Negative for back pain, joint swelling and myalgias.   Skin: Negative for rash.   Neurological: Negative for dizziness, syncope, weakness, light-headedness, numbness and headaches.   Hematological: Negative for adenopathy. Does not bruise/bleed easily.   Psychiatric/Behavioral: Negative for confusion, dysphoric mood and sleep disturbance. The patient is not nervous/anxious.          Objective      Vitals:     02/06/19 1646   BP: 145/76   Pulse: 115   Resp: 18   Temp: 98.1 °F (36.7 °C)   SpO2: 96%      Current Status 2/6/2019   ECOG score 0   Pain 5/10    Physical Exam   Constitutional: She is oriented to person, place, and time. She appears well-developed and well-nourished.   HENT:   Mouth/Throat: Oropharynx is clear and moist.   Eyes: Conjunctivae are normal.   Neck: No thyromegaly present.   Cardiovascular: Normal rate and regular rhythm. Exam reveals no gallop and no friction rub.   No murmur heard.  Pulmonary/Chest: Effort normal and breath sounds normal. No respiratory distress. She has no wheezes.   Abdominal: Soft. Bowel sounds are normal. She exhibits no distension. There is no tenderness.   Musculoskeletal: She exhibits no edema.   Lymphadenopathy:        Head (right side): No submandibular adenopathy present.     She has no cervical adenopathy.     She has no axillary adenopathy.        Right: No inguinal and no supraclavicular adenopathy present.        Left: No inguinal and no supraclavicular adenopathy present.   Neurological: She is alert and oriented to person, place, and time. She displays normal reflexes. No cranial nerve deficit. She exhibits normal muscle tone.   Skin: Skin is warm and dry. No rash noted.   Psychiatric: She has a normal mood and affect. Her behavior is normal.       RECENT LABS:  Hematology WBC   Date Value Ref Range Status   02/06/2019 9.55 4.00 - 10.00 10*3/mm3 Final     RBC   Date Value Ref Range Status   02/06/2019 4.29 3.90 - 5.00 10*6/mm3 Final     Hemoglobin   Date Value Ref Range Status   02/06/2019 12.2 11.5 - 14.9 g/dL Final     Hematocrit   Date Value Ref Range Status   02/06/2019 38.4 34.0 - 45.0 % Final     Platelets   Date Value Ref Range Status   02/06/2019 358 150 - 375 10*3/mm3 Final        Lab Results   Component Value Date    GLUCOSE 168 (H) 02/06/2019    BUN 14 02/06/2019    CREATININE 0.57 (L) 02/06/2019    EGFRIFNONA 115 02/06/2019    BCR 24.6 02/06/2019    CO2  22.8 02/06/2019    CALCIUM 9.3 02/06/2019    ALBUMIN 4.30 02/06/2019    AST 25 02/06/2019    ALT 24 02/06/2019         Assessment/Plan     1. Stage IIA ([id number]) left breast cancer:   The patient had 2 primary lesions of the left breast, the first ER positive, VT borderline, HER-2/jodi negative. The second was ER positive, VT negative, HER-2/jodi positive. She had 3 of 15 lymph nodes positive. She received adjuvant dose-dense chemotherapy with AC x4 cycles followed by weekly Taxol x12 doses completed on 08/27/2013. She did receive Herceptin in conjunction with the Taxol and then completed a total of a year with last dose of Herceptin received on 05/21/2014 (3-week dose). She did initiate adjuvant tamoxifen on 09/25/2013 and we anticipate likely a 10-year course of treatment. BRCA-1 and 2 testing negative at time of diagnosis.  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    Due to continued menorrhagia she underwent KATE/BSO on 6/5/18 with benign findings.  DEXA scan 10/12/18 with evidence of osteopenia with maximal T score -1.4 the lumbar spine.  Since the patient was rendered surgically postmenopausal with only mild osteopenia, she was transitioned from tamoxifen to adjuvant Arimidex on 11/21/18 with intent to complete 10 years of total adjuvant endocrine therapy (from time of initiation of tamoxifen 9/25/13).    Patient returns today in follow-up tolerating Arimidex extremely well.  She's had no significant side effects from the medication.  She has no clinical evidence of recurrent disease.  We did not pursue breast exam today but will pursue one in 3 months when she returns.  She did undergo her annual mammogram on 10/22/18 which was negative, BI-RADS 1.  She will return in 3 months for follow-up and then assuming she is stable we will transition back to 6 month follow-up visits.    2. Depression/anxiety with prior psychotic episode: The patient required hospitalization in the behavioral  health unit in October 2015. She has had difficulties with anxiety and depression in the past, however, given her breast cancer diagnosis as well as stressors from her job and family, it appears that these precipitated the event.  She has stabilized on Lexapro at 20 mg per day.   3. Left wrist tenosynovitis: This has improved.  4. Diffuse arthralgias and fatigue with suspected early rheumatoid arthritis: The patient was experiencing diffuse arthralgias as well as pain in her wrists right greater than left.  We did perform an evaluation 4/28/17 with negative MARYSE panel however rheumatoid factor was positive at 16.4.  She was referred to rheumatology Dr. Lopez in May 2017. It appears that the patient was felt to potentially have early autoimmune disorder/rheumatoid arthritis.  She was started on Plaquenil.  Symptoms have stabilized and improved slightly over time.  She does continue routine follow-up with rheumatology.    5. Vitamin D deficiency: The patient went off her vitamin D supplement and developed recurrent vitamin D deficiency.  She had been receiving 1000 units per day with a low level on 5/27/16 of 25.2.  She subsequently increased her dose to 2000 units daily and her level improved to 34.1.  She reported compliance with her vitamin D supplementation and level on 4/28/17 was low at 29.8.  Therefore, she was transitioned to 50,000 units weekly for 8 weeks in early May 2017.  A repeat 25-hydroxy vitamin D level on 7/14/17 was 34.9.  The patient was asked to transition back to a 2000 unit daily.  Repeat 25-hydroxy vitamin D level on 2/28/18 was normal at 34.9.  For unclear reasons, the patient went off of vitamin D supplementation.  Repeat level on 10/4/18 was 35.6 and she was asked to resume vitamin D and calcium supplementation.  Patient is currently receiving calcium and vitamin D supplementation over-the-counter.  Repeat 25-hydroxy vitamin D level in 3 months when the patient returns.  6. Hot flashes  secondary to adjuvant endocrine therapy: The patient is not requiring any pharmacologic therapy for this.  Her symptoms are significantly improved.   7. Endometrial thickening with dysfunctional uterine bleeding on tamoxifen: The patient underwent endometrial biopsy in October 2015 and again in 1/18/16 with negative findings.  She then experienced a significant degree of menstrual blood loss in early March 2016 to the point where she became anemic.  She did undergo a D and C with Dr. Roland on 4/13/16.  Pathology showed a polypoid endometrium with disordered proliferation but no evidence of malignancy.  She did see her gynecologist in January 2017 and they had discussed a possible laparoscopic assisted vaginal hysterectomy once she loses some more weight.  The patient had 2 profuse menstrual cycles, one in April and the next in September 2017.  She had subsequent recurrence of menorrhagia and ultimately underwent KATE/BSO on 6/5/18.  Pathologic findings were benign.  8. Anemia: The patient developed significant anemia with a hemoglobin down to 10.8 on 3/30/16.  Evaluation revealed iron deficiency with a ferritin of 12.5 iron percent saturation of 5 and a TIBC 535.  She started oral iron sulfate 325 mg twice a day and was tolerating this very well.  Her hemoglobin improved into the 12 range.  Her iron studies showed a significant improvement previously in October 2016 with ferritin up to 54, iron saturation 16%.  She was asked to continue on oral iron twice a day however she did not do so.  The patient also was found to have B12 deficiency with a level of 187 on 3/30/16.  She started B12 replacement and received weekly dosing ×4 followed bimonthly injections, last received 7/22/16.  As it was difficult for her to come in to the office for B12 injections, she was asked to begin oral B12 1000 µg daily however she did not do this.  On 4/28/17, she was mildly anemic with a hemoglobin of 11.4.  She had a thrombocytosis as  well, related to iron deficiency.  Repeat iron studies 4/28/17 showed a ferritin that declined to 42, iron saturation low at 7%, elevated TIBC at 491. She was asked to resume oral iron twice daily.  B12 level 4/28/17 had remained in the low normal range at 370 and was monitored.  On 7/14/17, B12 level was again low normal at 299 if she was asked to resume oral B12 however she did not do so.  She was intolerant of oral iron with diarrhea and abdominal cramping.  Labs on 11/8/17 showed a declining iron saturation 9% and a slight decline in ferritin down to 39 with a TIBC at the 501.  Her hemoglobin was up to 12.8.  Oral iron was discontinued 11/8/17 and she proceeded on with Feraheme on 11/15/17 and 11/22/17.  She also resumed oral B12 1000 µg daily.  The patient had recurrent menorrhagia but is now status post KATE/BSO on 6/5/18.  Labs 10/4/18 with hemoglobin 12.0,  ferritin at 64.3, iron saturation low normal at 15% however elevated TIBC at 521.  We will continue to monitor her iron studies for now however hopefully they will not decline significantly further without continued menstrual blood loss.  B12 level 10/4/18 was normal at 573 and the patient has elected to remain off of oral B12.  We will recheck B12 and iron studies when she returns in 3 months.  9. Elevated transaminases: The patient has experienced this in the past with negative hepatic imaging.  I suspect that she has underlying steatohepatitis.  Current LFTs are normal.  10. Left upper extremity lymphedema: The patient does wear her sleeve intermittently, is not wearing it today.   11. Osteopenia: Previous DEXA scan 3/5/14 osteopenia with maximal T score of -1.1 in the lumbar spine.  Repeat DEXA scan in 10/12/18 with maximal T score -1.4 lumbar spine showing worsening since 2014.  Patient was advised to resume calcium and vitamin D supplementation.  Aromatase inhibitor therapy initiated with Arimidex 1 mg daily on 11/20/18, plan annual DEXA scan while  on aromatase inhibitor therapy.  12. Tinea corporis: The patient had an area of apparent ringworm around her left axillary fold measuring around 2 cm, annular with central scaling.  Prescription sent miconazole 1% cream.  The patient does not use the medication routinely, reports that when she does use it more often the rash improves further.  13. Chronic fatigue: Somewhat improved today.  Thyroid function studies normal the last visit.  The patient reports compliance with CPAP.    PLAN:    1. Continue adjuvant Arimidex 1 mg daily  2. Continue calcium and vitamin D supplementation  3. M.D. visit in 3 months with CBC, CMP, B12 level, vitamin D 25-hydroxy level, iron panel, ferritin.

## 2019-05-14 ENCOUNTER — LAB (OUTPATIENT)
Dept: LAB | Facility: HOSPITAL | Age: 44
End: 2019-05-14

## 2019-05-14 ENCOUNTER — OFFICE VISIT (OUTPATIENT)
Dept: ONCOLOGY | Facility: CLINIC | Age: 44
End: 2019-05-14

## 2019-05-14 VITALS
WEIGHT: 293 LBS | HEART RATE: 125 BPM | HEIGHT: 65 IN | OXYGEN SATURATION: 96 % | RESPIRATION RATE: 16 BRPM | DIASTOLIC BLOOD PRESSURE: 89 MMHG | SYSTOLIC BLOOD PRESSURE: 129 MMHG | BODY MASS INDEX: 48.82 KG/M2 | TEMPERATURE: 98.9 F

## 2019-05-14 DIAGNOSIS — D50.8 OTHER IRON DEFICIENCY ANEMIA: ICD-10-CM

## 2019-05-14 DIAGNOSIS — C50.812 MALIGNANT NEOPLASM OF OVERLAPPING SITES OF LEFT BREAST IN FEMALE, ESTROGEN RECEPTOR POSITIVE (HCC): Primary | ICD-10-CM

## 2019-05-14 DIAGNOSIS — D51.8 OTHER VITAMIN B12 DEFICIENCY ANEMIA: ICD-10-CM

## 2019-05-14 DIAGNOSIS — E55.9 VITAMIN D DEFICIENCY: ICD-10-CM

## 2019-05-14 DIAGNOSIS — R74.8 ABNORMAL TRANSAMINASES: ICD-10-CM

## 2019-05-14 DIAGNOSIS — E83.52 HYPERCALCEMIA: ICD-10-CM

## 2019-05-14 DIAGNOSIS — Z17.0 MALIGNANT NEOPLASM OF OVERLAPPING SITES OF LEFT BREAST IN FEMALE, ESTROGEN RECEPTOR POSITIVE (HCC): Primary | ICD-10-CM

## 2019-05-14 LAB
25(OH)D3 SERPL-MCNC: 28.3 NG/ML (ref 30–100)
ALBUMIN SERPL-MCNC: 4.6 G/DL (ref 3.5–5.2)
ALBUMIN/GLOB SERPL: 1.4 G/DL (ref 1.1–2.4)
ALP SERPL-CCNC: 101 U/L (ref 38–116)
ALT SERPL W P-5'-P-CCNC: 44 U/L (ref 0–33)
ANION GAP SERPL CALCULATED.3IONS-SCNC: 16.3 MMOL/L
AST SERPL-CCNC: 51 U/L (ref 0–32)
BASOPHILS # BLD AUTO: 0.06 10*3/MM3 (ref 0–0.2)
BASOPHILS NFR BLD AUTO: 0.6 % (ref 0–1.5)
BILIRUB SERPL-MCNC: 0.2 MG/DL (ref 0.2–1.2)
BUN BLD-MCNC: 11 MG/DL (ref 6–20)
BUN/CREAT SERPL: 18.6 (ref 7.3–30)
CALCIUM SPEC-SCNC: 10.8 MG/DL (ref 8.5–10.2)
CHLORIDE SERPL-SCNC: 94 MMOL/L (ref 98–107)
CO2 SERPL-SCNC: 27.7 MMOL/L (ref 22–29)
CREAT BLD-MCNC: 0.59 MG/DL (ref 0.6–1.1)
DEPRECATED RDW RBC AUTO: 41.8 FL (ref 37–54)
EOSINOPHIL # BLD AUTO: 0.49 10*3/MM3 (ref 0–0.4)
EOSINOPHIL NFR BLD AUTO: 4.8 % (ref 0.3–6.2)
ERYTHROCYTE [DISTWIDTH] IN BLOOD BY AUTOMATED COUNT: 12.8 % (ref 12.3–15.4)
FERRITIN SERPL-MCNC: 94 NG/ML (ref 11–207)
GFR SERPL CREATININE-BSD FRML MDRD: 110 ML/MIN/1.73
GLOBULIN UR ELPH-MCNC: 3.4 GM/DL (ref 1.8–3.5)
GLUCOSE BLD-MCNC: 176 MG/DL (ref 74–124)
HAV IGM SERPL QL IA: NORMAL
HBV CORE IGM SERPL QL IA: NORMAL
HBV SURFACE AG SERPL QL IA: NORMAL
HCT VFR BLD AUTO: 42 % (ref 34–46.6)
HCV AB SER DONR QL: NORMAL
HGB BLD-MCNC: 13.5 G/DL (ref 12–15.9)
IMM GRANULOCYTES # BLD AUTO: 0.08 10*3/MM3 (ref 0–0.05)
IMM GRANULOCYTES NFR BLD AUTO: 0.8 % (ref 0–0.5)
IRON 24H UR-MRATE: 46 MCG/DL (ref 37–145)
IRON SATN MFR SERPL: 9 % (ref 14–48)
LYMPHOCYTES # BLD AUTO: 2.24 10*3/MM3 (ref 0.7–3.1)
LYMPHOCYTES NFR BLD AUTO: 22.1 % (ref 19.6–45.3)
MCH RBC QN AUTO: 28.7 PG (ref 26.6–33)
MCHC RBC AUTO-ENTMCNC: 32.1 G/DL (ref 31.5–35.7)
MCV RBC AUTO: 89.2 FL (ref 79–97)
MONOCYTES # BLD AUTO: 0.47 10*3/MM3 (ref 0.1–0.9)
MONOCYTES NFR BLD AUTO: 4.6 % (ref 5–12)
NEUTROPHILS # BLD AUTO: 6.81 10*3/MM3 (ref 1.7–7)
NEUTROPHILS NFR BLD AUTO: 67.1 % (ref 42.7–76)
NRBC BLD AUTO-RTO: 0 /100 WBC (ref 0–0.2)
PLATELET # BLD AUTO: 370 10*3/MM3 (ref 140–450)
PMV BLD AUTO: 10.2 FL (ref 6–12)
POTASSIUM BLD-SCNC: 4.2 MMOL/L (ref 3.5–4.7)
PROT SERPL-MCNC: 8 G/DL (ref 6.3–8)
PTH-INTACT SERPL-MCNC: 12.8 PG/ML (ref 15–65)
RBC # BLD AUTO: 4.71 10*6/MM3 (ref 3.77–5.28)
SODIUM BLD-SCNC: 138 MMOL/L (ref 134–145)
TIBC SERPL-MCNC: 517 MCG/DL (ref 249–505)
TRANSFERRIN SERPL-MCNC: 369 MG/DL (ref 200–360)
VIT B12 BLD-MCNC: 1053 PG/ML (ref 211–946)
WBC NRBC COR # BLD: 10.15 10*3/MM3 (ref 3.4–10.8)

## 2019-05-14 PROCEDURE — 80074 ACUTE HEPATITIS PANEL: CPT | Performed by: INTERNAL MEDICINE

## 2019-05-14 PROCEDURE — 82306 VITAMIN D 25 HYDROXY: CPT | Performed by: INTERNAL MEDICINE

## 2019-05-14 PROCEDURE — 80053 COMPREHEN METABOLIC PANEL: CPT | Performed by: INTERNAL MEDICINE

## 2019-05-14 PROCEDURE — 83970 ASSAY OF PARATHORMONE: CPT | Performed by: INTERNAL MEDICINE

## 2019-05-14 PROCEDURE — 36415 COLL VENOUS BLD VENIPUNCTURE: CPT | Performed by: INTERNAL MEDICINE

## 2019-05-14 PROCEDURE — 84466 ASSAY OF TRANSFERRIN: CPT | Performed by: INTERNAL MEDICINE

## 2019-05-14 PROCEDURE — 82607 VITAMIN B-12: CPT | Performed by: INTERNAL MEDICINE

## 2019-05-14 PROCEDURE — 85025 COMPLETE CBC W/AUTO DIFF WBC: CPT | Performed by: INTERNAL MEDICINE

## 2019-05-14 PROCEDURE — 83540 ASSAY OF IRON: CPT | Performed by: INTERNAL MEDICINE

## 2019-05-14 PROCEDURE — 82728 ASSAY OF FERRITIN: CPT | Performed by: INTERNAL MEDICINE

## 2019-05-14 PROCEDURE — 99215 OFFICE O/P EST HI 40 MIN: CPT | Performed by: INTERNAL MEDICINE

## 2019-05-14 NOTE — PROGRESS NOTES
Subjective .     REASONS FOR FOLLOWUP:    1. Stage IIA ([id number]) left breast cancer:  Status post left mastectomy with left axillary lymph node dissection 03/12/2013.  Lesion #1 invasive ductal carcinoma grade 2, 1.5 cm ER positive (90%), [place] positive (1-4%), HER-2/jodi FISH negative (ratio 1.4),  Ki-67 score of 8%.  Lesion #2 invasive ductal carcinoma, grade 2, 1.7 cm,  ER positive (70%), KY negative (%),HER-2/jodi FISH positive (ratio of 3.0).  3 of 15 lymph nodes positive (2 macrometastases, 1 micrometastasis, largest lesion 2.5 cm replacing lymph node).  Lymph node evaluated and is ER positive (90%), [place] positive (30%), HER-2/jodi FISH negative (ratio of 1.4).    2. Iron deficiency anemia presumed secondary to menstrual blood loss.  Previously treated with iron sulfate 325 mg daily.  Iron discontinued secondary to GI side effects (diarrhea).  3. BRCA-1 and 2 testing negative.  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  4. Participation in the [employer] Clinical trial.   5. Status post Mediport placement by Dr. Neumann 04/08/2013.  Mediport removed by Dr. Mabry 07/21/2014.  6. Initiation of adjuvant chemotherapy 04/16/2013.  Treatment planned:  dose-dense Adriamycin and Cytoxan with Neulasta support q.2 weeks x4 cycles.  Subsequent weekly Taxol and Herceptin x12 weeks.  Subsequent continuation of Herceptin q.3 weeks to complete one year.  Adjuvant endocrine therapy after completion of chemotherapy with tamoxifen (premenopausal).   Adjuvant radiation therapy following completion of chemotherapy.  Last dose of Taxol administered 08/27/2013. Tamoxifen initiated 09/25/2013 (plan to treat for 10 years).   Final dose of Herceptin administered 05/21/2014 (one year of treatment completed 06/11/2014).   7. 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  8. Borderline elevated transaminases. CT abdomen 10/30/2013 with normal appearing liver. Suspected steatohepatitis.    9. Left foot plantar fasciitis with stress fracture of the 4th metatarsal, status post evaluation with bone scan, 09/29/2014 and MRI of the left foot, 10/08/2014. Negative for metastatic disease.    10. Lymphedema of the left upper extremity with compression sleeve in place and having been evaluated in the lymphedema clinic.   11. Left wrist tenosynovitis identified on MRI of 06/18/2015. Status post steroid injection by orthopedics, on 07/18/2015.   12. Anemia with hemoglobin decreased to 10.8 on 3/30/16.  Evaluation revealing evidence of iron deficiency (related to menstrual blood loss) and B12 deficiency.  Initiated oral iron sulfate 325 mg twice a day as well as B12 replacement with weekly injections ×4 and subsequent monthly injections with intended transition to oral B12 1000 µg daily, however, patient did not follow through.  Patient also discontinued oral iron.  Recurrent iron deficiency with oral iron resumed 5/4/17 twice a day dosing, again discontinued due to poor tolerance.  Administration of Feraheme 11/15 and 11/22/17.  13. Vitamin D deficiency continuing on vitamin D 2000 units daily  14. Positive rheumatoid factor 4/28/17 (16.4), referred to rheumatology for further evaluation.  Initiated treatment with Plaquenil per rheumatology.  15. Status post KATE/BSO 6/5/18 (benign pathologic findings).  Transition from tamoxifen to adjuvant Arimidex 11/20/18 due to surgical postmenopausal state.  16. Osteopenia: DEXA scan 10/12/18 with maximal T score -1.4 in the lumbar spine, monitor annually on aromatase inhibitor therapy.  Calcium and vitamin D supplementation.  Normal 25-hydroxy vitamin D level 10/4/18 at 35.6.    HISTORY OF PRESENT ILLNESS:  The patient is a 43 y.o. year old female who is here for follow-up with the above-mentioned history.    History of Present Illness the patient returns today continuing on adjuvant Arimidex 1 mg daily.  The patient has also been continuing on calcium 600 mg and vitamin  D 400 units daily.  She is taking an over-the-counter B supplement.  Patient today reports that she has had difficulty with pain from extraction of wisdom tooth last week.  She reports that her rheumatoid arthritis symptoms are stable involving her hands and wrists, continuing on Plaquenil.  She recently went to a seminar on gastric bypass and is trying to proceed with this, anticipating possible surgery in September at [facility] in [place].  She notes continued difficulty with diarrhea which has been a issue for about the past 3 to 6 months.  She has been experiencing 4-5 stools per day, soft to sometimes watery, no blood evident.  There was concern that this was related to metformin and she has been off of metformin for a little over a week with no change.  She had discussed possibility of colonoscopy with her primary care physician.    PAST MEDICAL HISTORY:    1. Hypertension.  2. History of menorrhagia status post DandC with Dr. Elda Roland 16.  Pathology showed polypoid endometrium with disordered proliferation but no evidence of malignancy.  Menstrual blood loss ceased.  3. History of phentermine use for weight loss.  4. Status post knee surgery after traumatic fracture with hardware in place in .  5. Status post  x2.  6. Status post Mediport placement by Dr. Neumann on 2013. Mediport removed by Dr. Mabry 2014.  7. Left foot plantar fasciitis with stress fracture of the left 4th metatarsal, 2014.   8. Osteopenia.   9. Vitamin D deficiency.   10. Anxiety/depression.  Hospitalization required in 10/2015 due to major depressive disorder with psychotic features.   11. Anemia secondary to iron deficiency (menstrual blood loss) and B12 deficiency.  12. Suspected early rheumatoid arthritis initiated Plaquenil under the direction of Dr. Lopez in rheumatology in May 2017.  Positive rheumatoid factor 16.4 on 17.  13. Status post right carpal tunnel release surgery in  2017  14. Diabetes mellitus  15. Status post KATE/BSO 18    OB-GYN HISTORY:   G3, P3, A0, menarche age 10.  The patient has had regular menstrual cycles, however, has some degree of menorrhagia.  She was on oral contraceptives for 3-4 years in her late teens to early 20s. Menstrual cycles ceased with the 1st cycle of chemotherapy.     Menstrual cycles resumed in 2015. Endometrial biopsy performed due to endometrial thickening in 2015 and again 16 with benign pathology.  Recurrent severe dysfunctional uterine bleeding in early 2016 causing anemia.  D&C 16-  pathology showed polypoid endometrium with disordered proliferation but no evidence of malignancy.   Past Medical History:   Diagnosis Date   • Anxiety    • Arthritis    • Depression    • H/O Borderline vitamin D deficiency    • H/O Grade 1 peripheral neuropathy     Involving feet.   • H/O Iron deficiency anemia     Presumed secondary to menstrual loss.   • H/O Lymphedema of left upper extremity    • Hx of being hospitalized 10/2015    Due to major depressive disorder with psychotic features.   • Hypertension    • Malignant neoplasm of breast (CMS/HCC) 2013    LEFT SIDE WITH MASTECTOMY (BRCA-1 and 2 negative)   • Menorrhagia    • Osteopenia    • Plantar fasciitis     LEFT FOOT   • Rash     BILATERAL LOWER LEGS CAUSED BY CHEMO   • Sleep apnea     USES CPAP   • Tenosynovitis of left wrist    • Vitamin D deficiency     BORDERLINE     Past Surgical History:   Procedure Laterality Date   •  SECTION  , 2002    x2   • D&C HYSTEROSCOPY N/A 2016    Procedure: DILATATION AND CURETTAGE HYSTEROSCOPY;  Surgeon: Elda Roland MD;  Location: [facility] OR [facility];  Service:    • KNEE SURGERY Right     After traumatic fracture with hardware in place.   • MEDIPORT INSERTION, DOUBLE Right    • MEDIPORT REMOVAL Right 2014    By Dr. Mabry.   • MODIFIED RADICAL MASTECTOMY W/ AXILLARY LYMPH NODE DISSECTION Left 2013    • TOTAL LAPAROSCOPIC HYSTERECTOMY WITH TRANSVAGINAL TAPING Bilateral 6/5/2018    Procedure: TOTAL LAPAROSCOPIC HYSTERECTOMY SHA SALPNGO OOPHORECTOMY TENSION FREE VAGINAL TAPING CYSTOSCOPY;  Surgeon: Jocelynn Zimmerman MD;  Location: [facility] OR;  Service: Gynecology     ONCOLOGIC HISTORY:  (History from previous dates can be found in the separate document.)    Current Outpatient Medications on File Prior to Visit   Medication Sig Dispense Refill   • anastrozole (ARIMIDEX) 1 MG tablet Take  by mouth Daily.     • cholecalciferol (VITAMIN D3) 1000 UNITS tablet Take 1,000 Units by mouth daily.     • Cyanocobalamin (B-12) 1000 MCG capsule Take 1 capsule by mouth Daily.     • escitalopram (LEXAPRO) 20 MG tablet Take 20 mg by mouth daily.     • hydroxychloroquine (PLAQUENIL) 200 MG tablet Take 400 mg by mouth Daily.     • ibuprofen (ADVIL,MOTRIN) 600 MG tablet Take 1 tablet by mouth Every 6 (Six) Hours As Needed for Mild Pain  or Moderate Pain . 60 tablet 0   • Loratadine 10 MG capsule Take 10 mg by mouth Daily.     • LORazepam (ATIVAN) 1 MG tablet Take 1 mg by mouth 3 (three) times a day.     • losartan-hydrochlorothiazide (HYZAAR) 100-25 MG per tablet Take 1 tablet by mouth Every Night.     • metFORMIN (GLUCOPHAGE) 500 MG tablet Take 500 mg by mouth Daily With Breakfast.     • metoprolol succinate XL (TOPROL-XL) 50 MG 24 hr tablet Take 50 mg by mouth Every Night.     • miconazole (MICONAZOLE ANTIFUNGAL) 2 % cream Apply  topically to the appropriate area as directed Every 12 (Twelve) Hours. 35 g 1   • naproxen (NAPROSYN) 500 MG tablet Take 500 mg by mouth As Needed for Moderate Pain .     • TRULICITY 0.75 MG/0.5ML solution pen-injector      • vitamin D (ERGOCALCIFEROL) 06091 UNITS capsule capsule Take 1 capsule by mouth 1 (One) Time Per Week. For 8 weeks 8 capsule 0   • docusate sodium (COLACE) 250 MG capsule Take 1 capsule by mouth 2 (Two) Times a Day As Needed for Constipation. 30 capsule 0   • ferrous sulfate 325 (65  FE) MG tablet Take 325 mg by mouth 2 (two) times a day.     • fluticasone (FLONASE) 50 MCG/ACT nasal spray 1 spray into each nostril As Needed for Allergies.     • HYDROcodone-acetaminophen (NORCO) 5-325 MG per tablet Take 1 tablet by mouth Every 6 (Six) Hours As Needed for Severe Pain . 30 tablet 0     Current Facility-Administered Medications on File Prior to Visit   Medication Dose Route Frequency Provider Last Rate Last Dose   • cyanocobalamin injection 1,000 mcg  1,000 mcg Intramuscular Once Glenn Lyon Jr., MD           ALLERGIES:     Allergies   Allergen Reactions   • Shellfish-Derived Products GI Intolerance   • Shrimp (Diagnostic) Nausea And Vomiting   • Adhesive Tape Rash       SOCIAL HISTORY:  The patient is  and lives with her  in [place], [place].  She works as a teacher.  She denies history of smoking, denies significant alcohol use.    Social History     Socioeconomic History   • Marital status:      Spouse name: Jonathan   • Number of children: 3   • Years of education: College   • Highest education level: Not on file   Occupational History   • Occupation: Teacher     Employer: [employer]     Comment: Worked in marketing in past.   Tobacco Use   • Smoking status: Never Smoker   • Smokeless tobacco: Never Used   Substance and Sexual Activity   • Alcohol use: Yes     Comment: Light (less than 1 per week)   • Drug use: No   • Sexual activity: Defer   Social History Narrative    Enjoys family activities, sports, 4-H leader/[place]. College education.       FAMILY HISTORY:  Maternal grandmother had breast cancer in her 70s; maternal grandmother’s sister had ovarian cancer in her 70s, and another sister had non-Hodgkin's lymphoma in her 70s.  Maternal grandfather had prostate cancer late in life, and his sister had ovarian cancer later in life.  On paternal side there is a history of diabetes mellitus.  A sister has had issues with anemia and easy bruising.  The patient was  seen in Genetics Clinic and underwent BRCA-1 and 2 testing prior to surgery which returned negative.      Family History   Problem Relation Age of Onset   • Cancer Other    • Stroke Other    • Diabetes Other    • Hypertension Other    • Other Other         cardiac disorder   • Ovarian cancer Other    • Lymphoma Other         NON-HODGKIN   • Diabetes insipidus Other    • Anemia Other    • Mental illness Other         Grandfather   • Arthritis Other    • Heart disease Other    • Breast cancer Maternal Grandmother         In her 70s.   • Prostate cancer Maternal Grandfather    • Other Sister         Anemia and easy bruising   • Hypertension Sister    • Hypertension Mother    • Mental illness Mother    • Hypertension Father    • Diabetes Father    • Malig Hyperthermia Neg Hx        Review of Systems   Constitutional: Positive for fatigue. Negative for activity change, appetite change, fever and unexpected weight change.   HENT: Positive for dental problem. Negative for congestion, mouth sores, nosebleeds and voice change.    Respiratory: Negative for cough and shortness of breath.    Cardiovascular: Negative for chest pain, palpitations and leg swelling.   Gastrointestinal: Positive for diarrhea. Negative for abdominal distention, abdominal pain, blood in stool, constipation, nausea and vomiting.   Endocrine: Negative for cold intolerance and heat intolerance.   Genitourinary: Negative for difficulty urinating, dysuria, frequency, hematuria and menstrual problem.   Musculoskeletal: Positive for arthralgias. Negative for back pain, joint swelling and myalgias.   Skin: Negative for rash.   Neurological: Negative for dizziness, syncope, weakness, light-headedness, numbness and headaches.   Hematological: Negative for adenopathy. Does not bruise/bleed easily.   Psychiatric/Behavioral: Negative for confusion, dysphoric mood and sleep disturbance. The patient is not nervous/anxious.          Objective      Vitals:    05/14/19  1644   BP: 129/89   Pulse: (!) 125   Resp: 16   Temp: 98.9 °F (37.2 °C)   SpO2: 96%      Current Status 5/14/2019   ECOG score 1   Pain 8/10    Physical Exam   Constitutional: She is oriented to person, place, and time. She appears well-developed and well-nourished.   HENT:   Mouth/Throat: Oropharynx is clear and moist.   Eyes: Conjunctivae are normal.   Neck: No thyromegaly present.   Cardiovascular: Normal rate and regular rhythm. Exam reveals no gallop and no friction rub.   No murmur heard.  Pulmonary/Chest: Effort normal and breath sounds normal. No respiratory distress. She has no wheezes.   Right breast without masses no abnormalities palpated.  Status post left mastectomy with post operative and post radiation change noted, no other abnormalities.   Abdominal: Soft. Bowel sounds are normal. She exhibits no distension. There is no tenderness.   Musculoskeletal: She exhibits no edema.   Lymphadenopathy:        Head (right side): No submandibular adenopathy present.     She has no cervical adenopathy.     She has no axillary adenopathy.        Right: No inguinal and no supraclavicular adenopathy present.        Left: No inguinal and no supraclavicular adenopathy present.   Neurological: She is alert and oriented to person, place, and time. She displays normal reflexes. No cranial nerve deficit. She exhibits normal muscle tone.   Skin: Skin is warm and dry. No rash noted.   Psychiatric: She has a normal mood and affect. Her behavior is normal.       RECENT LABS:  Hematology WBC   Date Value Ref Range Status   05/14/2019 10.15 3.40 - 10.80 10*3/mm3 Final     RBC   Date Value Ref Range Status   05/14/2019 4.71 3.77 - 5.28 10*6/mm3 Final     Hemoglobin   Date Value Ref Range Status   05/14/2019 13.5 12.0 - 15.9 g/dL Final     Hematocrit   Date Value Ref Range Status   05/14/2019 42.0 34.0 - 46.6 % Final     Platelets   Date Value Ref Range Status   05/14/2019 370 140 - 450 10*3/mm3 Final        Lab Results    Component Value Date    GLUCOSE 176 (H) 05/14/2019    BUN 11 05/14/2019    CREATININE 0.59 (L) 05/14/2019    EGFRIFNONA 110 05/14/2019    BCR 18.6 05/14/2019    CO2 27.7 05/14/2019    CALCIUM 10.8 (C) 05/14/2019    ALBUMIN 4.60 05/14/2019    AST 51 (H) 05/14/2019    ALT 44 (H) 05/14/2019         Assessment/Plan     1. Stage IIA ([id number]) left breast cancer:   The patient had 2 primary lesions of the left breast, the first ER positive, DE borderline, HER-2/jodi negative. The second was ER positive, DE negative, HER-2/jodi positive. She had 3 of 15 lymph nodes positive. She received adjuvant dose-dense chemotherapy with AC x4 cycles followed by weekly Taxol x12 doses completed on 08/27/2013. She did receive Herceptin in conjunction with the Taxol and then completed a total of a year with last dose of Herceptin received on 05/21/2014 (3-week dose). She did initiate adjuvant tamoxifen on 09/25/2013 and we anticipate likely a 10-year course of treatment. BRCA-1 and 2 testing negative at time of diagnosis.  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    Due to continued menorrhagia she underwent KATE/BSO on 6/5/18 with benign findings.  DEXA scan 10/12/18 with evidence of osteopenia with maximal T score -1.4 the lumbar spine.  Since the patient was rendered surgically postmenopausal with only mild osteopenia, she was transitioned from tamoxifen to adjuvant Arimidex on 11/21/18 with intent to complete 10 years of total adjuvant endocrine therapy (from time of initiation of tamoxifen 9/25/13).    Patient returns today in follow-up tolerating Arimidex extremely well.  She continues with no clinical evidence of recurrent disease.  She is not experiencing any significant side effects from her Arimidex.  Her exam today is negative.  Her last mammogram was 10/22/2018 and we will order her annual mammogram at her next visit here in 3 months.    2. Depression/anxiety with prior psychotic episode: The patient  required hospitalization in the behavioral health unit in October 2015. She has had difficulties with anxiety and depression in the past, however, given her breast cancer diagnosis as well as stressors from her job and family, it appears that these precipitated the event.  She has stabilized on Lexapro at 20 mg per day.   3. Rheumatoid arthritis: The patient was experiencing diffuse arthralgias as well as pain in her wrists right greater than left.  We did perform an evaluation 4/28/17 with negative MARYSE panel however rheumatoid factor was positive at 16.4.  She was referred to rheumatology Dr. Lopez in May 2017. The patient was felt to have early autoimmune disorder/rheumatoid arthritis.  She was started on Plaquenil.  Symptoms have stabilized and improved over time.  She does continue routine follow-up with rheumatology.    4. Vitamin D deficiency: The patient went off her vitamin D supplement and developed recurrent vitamin D deficiency.  She had been receiving 1000 units per day with a low level on 5/27/16 of 25.2.  She subsequently increased her dose to 2000 units daily and her level improved to 34.1.  She reported compliance with her vitamin D supplementation and level on 4/28/17 was low at 29.8.  Therefore, she was transitioned to 50,000 units weekly for 8 weeks in early May 2017.  A repeat 25-hydroxy vitamin D level on 7/14/17 was 34.9.  The patient was asked to transition back to a 2000 unit daily.  Repeat 25-hydroxy vitamin D level on 2/28/18 was normal at 34.9.  For unclear reasons, the patient went off of vitamin D supplementation.  Repeat level on 10/4/18 was 35.6 and she was asked to resume vitamin D and calcium supplementation, currently continuing on  combination calcium 600 mg/vitamin D 400 units daily.  25 hydroxy vitamin D level pending today.  5. Anemia: The patient developed significant anemia with a hemoglobin down to 10.8 on 3/30/16.  Evaluation revealed iron deficiency with a ferritin of 12.5  iron percent saturation of 5 and a TIBC 535.  She started oral iron sulfate 325 mg twice a day and was tolerating this very well.  Her hemoglobin improved into the 12 range.  Her iron studies showed a significant improvement previously in October 2016 with ferritin up to 54, iron saturation 16%.  She was asked to continue on oral iron twice a day however she did not do so.  The patient also was found to have B12 deficiency with a level of 187 on 3/30/16.  She started B12 replacement and received weekly dosing ×4 followed bimonthly injections, last received 7/22/16.  As it was difficult for her to come in to the office for B12 injections, she was asked to begin oral B12 1000 µg daily however she did not do this.  On 4/28/17, she was mildly anemic with a hemoglobin of 11.4.  She had a thrombocytosis as well, related to iron deficiency.  Repeat iron studies 4/28/17 showed a ferritin that declined to 42, iron saturation low at 7%, elevated TIBC at 491. She was asked to resume oral iron twice daily.  B12 level 4/28/17 had remained in the low normal range at 370 and was monitored.  On 7/14/17, B12 level was again low normal at 299 if she was asked to resume oral B12 however she did not do so.  She was intolerant of oral iron with diarrhea and abdominal cramping.  Labs on 11/8/17 showed a declining iron saturation 9% and a slight decline in ferritin down to 39 with a TIBC at the 501.  Her hemoglobin was up to 12.8.  Oral iron was discontinued 11/8/17 and she proceeded on with Feraheme on 11/15/17 and 11/22/17.  She also resumed oral B12 1000 µg daily.  The patient had recurrent menorrhagia but underwent KATE/BSO on 6/5/18.  Currently, labs show a hemoglobin that is improved further up to 13.5.  Iron studies show iron 46, ferritin increased to 94 however iron saturation remains low at 9% and TIBC elevated at 517.  We will continue to monitor iron studies again in 3 months when she returns.  B12 is pending today.  She does  take an over-the-counter B complex.  6. Elevated transaminases: The patient has experienced this in the past with negative hepatic imaging.  LFTs had normalized however today, ALT is elevated at 44, AST 51 with normal bilirubin 0.2, normal alkaline phosphatase 101.  We will check additional labs with viral hepatitis panel and also proceed with hepatic ultrasound in the next few weeks and she will call for the report.  Suspect hepatic steatosis.  7. Left upper extremity lymphedema: The patient does wear her sleeve intermittently, is not wearing it today.   8. Osteopenia: Previous DEXA scan 3/5/14 osteopenia with maximal T score of -1.1 in the lumbar spine.  Repeat DEXA scan in 10/12/18 with maximal T score -1.4 lumbar spine showing worsening since 2014.  Patient was advised to resume calcium and vitamin D supplementation.  Aromatase inhibitor therapy initiated with Arimidex 1 mg daily on 11/20/18, plan annual DEXA scan while on aromatase inhibitor therapy, due next in October 2019.  9. Hypercalcemia: The patient has new hypercalcemia today with calcium of 10.8.  Albumin is normal at 4.6.  She is taking a calcium supplement however only 600 mg daily.  We will check an intact PTH.  Patient will remain on her calcium supplement for now.  She will return in 2 weeks to recheck her calcium level.  10. Diarrhea: Patient has had chronic diarrhea for the past 3 to 6 months with very few formed stools, mostly soft to watery stools, often 3 to 5/day.  It was felt that this may be related to metformin and metformin was discontinued a little over a week ago.  The diarrhea however has persisted.  Her primary care physician did mention possibility of pursuing a colonoscopy (patient has not had one in the past).  I reiterated to the patient that if her diarrhea does not improve over the next 1 to 2 weeks off metformin she should proceed with colonoscopy.  I offered her referral to GI or she could go through her primary care  physician.  For now she wishes to delay this.  11. Obesity: The patient is considering proceeding with gastric bypass at [facility] in September.  She is going through a process over the next few months to prepare for potential surgery.  Given her significant obesity, I encouraged her to consider proceeding with this.  Other modes of weight loss have been ineffective for her in the past.    PLAN:    1. Continue adjuvant Arimidex 1 mg daily  2. Continue calcium and vitamin D supplementation  3. Labs pending today with B12 level and 25-hydroxy vitamin D level  4. Additional labs ordered today with viral hepatitis panel, intact PTH  5. In 2 weeks CMP with RN review and hepatic ultrasound.  Patient will call for the results.  6. M.D. visit in 3 months with CBC, CMP, iron panel, ferritin.  At that visit we will need to schedule the patient's mammogram and DEXA scan in October.

## 2019-05-16 ENCOUNTER — TELEPHONE (OUTPATIENT)
Dept: ONCOLOGY | Facility: CLINIC | Age: 44
End: 2019-05-16

## 2019-05-28 ENCOUNTER — CLINICAL SUPPORT (OUTPATIENT)
Dept: ONCOLOGY | Facility: HOSPITAL | Age: 44
End: 2019-05-28

## 2019-05-28 ENCOUNTER — LAB (OUTPATIENT)
Dept: LAB | Facility: HOSPITAL | Age: 44
End: 2019-05-28

## 2019-05-28 ENCOUNTER — HOSPITAL ENCOUNTER (OUTPATIENT)
Dept: ULTRASOUND IMAGING | Facility: HOSPITAL | Age: 44
Discharge: HOME | End: 2019-05-28
Admitting: INTERNAL MEDICINE

## 2019-05-28 DIAGNOSIS — D51.8 OTHER VITAMIN B12 DEFICIENCY ANEMIA: ICD-10-CM

## 2019-05-28 DIAGNOSIS — D50.8 OTHER IRON DEFICIENCY ANEMIA: ICD-10-CM

## 2019-05-28 DIAGNOSIS — R74.8 ABNORMAL TRANSAMINASES: ICD-10-CM

## 2019-05-28 DIAGNOSIS — E83.52 HYPERCALCEMIA: ICD-10-CM

## 2019-05-28 DIAGNOSIS — Z17.0 MALIGNANT NEOPLASM OF OVERLAPPING SITES OF LEFT BREAST IN FEMALE, ESTROGEN RECEPTOR POSITIVE (HCC): ICD-10-CM

## 2019-05-28 DIAGNOSIS — E55.9 VITAMIN D DEFICIENCY: ICD-10-CM

## 2019-05-28 DIAGNOSIS — C50.812 MALIGNANT NEOPLASM OF OVERLAPPING SITES OF LEFT BREAST IN FEMALE, ESTROGEN RECEPTOR POSITIVE (HCC): ICD-10-CM

## 2019-05-28 LAB
ALBUMIN SERPL-MCNC: 4.8 G/DL (ref 3.5–5.2)
ALBUMIN/GLOB SERPL: 1.4 G/DL (ref 1.1–2.4)
ALP SERPL-CCNC: 114 U/L (ref 38–116)
ALT SERPL W P-5'-P-CCNC: 45 U/L (ref 0–33)
ANION GAP SERPL CALCULATED.3IONS-SCNC: 14.3 MMOL/L
AST SERPL-CCNC: 33 U/L (ref 0–32)
BILIRUB SERPL-MCNC: 0.2 MG/DL (ref 0.2–1.2)
BUN BLD-MCNC: 13 MG/DL (ref 6–20)
BUN/CREAT SERPL: 21.7 (ref 7.3–30)
CALCIUM SPEC-SCNC: 10.1 MG/DL (ref 8.5–10.2)
CHLORIDE SERPL-SCNC: 97 MMOL/L (ref 98–107)
CO2 SERPL-SCNC: 25.7 MMOL/L (ref 22–29)
CREAT BLD-MCNC: 0.6 MG/DL (ref 0.6–1.1)
GFR SERPL CREATININE-BSD FRML MDRD: 108 ML/MIN/1.73
GLOBULIN UR ELPH-MCNC: 3.4 GM/DL (ref 1.8–3.5)
GLUCOSE BLD-MCNC: 229 MG/DL (ref 74–124)
POTASSIUM BLD-SCNC: 3.9 MMOL/L (ref 3.5–4.7)
PROT SERPL-MCNC: 8.2 G/DL (ref 6.3–8)
SODIUM BLD-SCNC: 137 MMOL/L (ref 134–145)

## 2019-05-28 PROCEDURE — 80053 COMPREHEN METABOLIC PANEL: CPT | Performed by: INTERNAL MEDICINE

## 2019-05-28 PROCEDURE — 36415 COLL VENOUS BLD VENIPUNCTURE: CPT | Performed by: INTERNAL MEDICINE

## 2019-05-28 PROCEDURE — 76705 ECHO EXAM OF ABDOMEN: CPT

## 2019-05-28 NOTE — PROGRESS NOTES
Called and reviewed CMP with pt. Informed pt of ALT and AST results. Informed her that I do not have liver US results as of yet. She v/u

## 2019-05-29 ENCOUNTER — TELEPHONE (OUTPATIENT)
Dept: ONCOLOGY | Facility: HOSPITAL | Age: 44
End: 2019-05-29

## 2019-05-29 NOTE — TELEPHONE ENCOUNTER
Called, pt.  No answer.  Left message.       ----- Message from Glenn Lyon Jr., MD sent at 5/29/2019 12:41 PM EDT -----  Please notify patient that ultrasound showed fatty liver as suspected no other concerning findings.  ----- Message -----  From: Interface, Rad Results [place] [facility]  Sent: 5/29/2019  12:27 PM  To: Glenn Lyon Jr., MD

## 2019-08-06 ENCOUNTER — LAB (OUTPATIENT)
Dept: LAB | Facility: HOSPITAL | Age: 44
End: 2019-08-06

## 2019-08-06 ENCOUNTER — OFFICE VISIT (OUTPATIENT)
Dept: ONCOLOGY | Facility: CLINIC | Age: 44
End: 2019-08-06

## 2019-08-06 VITALS
BODY MASS INDEX: 48.82 KG/M2 | WEIGHT: 293 LBS | HEART RATE: 108 BPM | TEMPERATURE: 98.5 F | RESPIRATION RATE: 16 BRPM | DIASTOLIC BLOOD PRESSURE: 73 MMHG | HEIGHT: 65 IN | SYSTOLIC BLOOD PRESSURE: 139 MMHG

## 2019-08-06 DIAGNOSIS — E83.52 HYPERCALCEMIA: ICD-10-CM

## 2019-08-06 DIAGNOSIS — Z17.0 MALIGNANT NEOPLASM OF OVERLAPPING SITES OF LEFT BREAST IN FEMALE, ESTROGEN RECEPTOR POSITIVE (HCC): Primary | ICD-10-CM

## 2019-08-06 DIAGNOSIS — E55.9 VITAMIN D DEFICIENCY: ICD-10-CM

## 2019-08-06 DIAGNOSIS — C50.812 MALIGNANT NEOPLASM OF OVERLAPPING SITES OF LEFT BREAST IN FEMALE, ESTROGEN RECEPTOR POSITIVE (HCC): Primary | ICD-10-CM

## 2019-08-06 DIAGNOSIS — D51.8 OTHER VITAMIN B12 DEFICIENCY ANEMIA: ICD-10-CM

## 2019-08-06 DIAGNOSIS — M85.89 OSTEOPENIA OF MULTIPLE SITES: ICD-10-CM

## 2019-08-06 DIAGNOSIS — Z17.0 MALIGNANT NEOPLASM OF OVERLAPPING SITES OF LEFT BREAST IN FEMALE, ESTROGEN RECEPTOR POSITIVE (HCC): ICD-10-CM

## 2019-08-06 DIAGNOSIS — D50.8 OTHER IRON DEFICIENCY ANEMIA: ICD-10-CM

## 2019-08-06 DIAGNOSIS — C50.812 MALIGNANT NEOPLASM OF OVERLAPPING SITES OF LEFT BREAST IN FEMALE, ESTROGEN RECEPTOR POSITIVE (HCC): ICD-10-CM

## 2019-08-06 DIAGNOSIS — R74.8 ABNORMAL TRANSAMINASES: ICD-10-CM

## 2019-08-06 LAB
25(OH)D3 SERPL-MCNC: 34.8 NG/ML (ref 30–100)
ALBUMIN SERPL-MCNC: 4.7 G/DL (ref 3.5–5.2)
ALBUMIN/GLOB SERPL: 1.4 G/DL (ref 1.1–2.4)
ALP SERPL-CCNC: 106 U/L (ref 38–116)
ALT SERPL W P-5'-P-CCNC: 50 U/L (ref 0–33)
ANION GAP SERPL CALCULATED.3IONS-SCNC: 14.5 MMOL/L (ref 5–15)
AST SERPL-CCNC: 37 U/L (ref 0–32)
BASOPHILS # BLD AUTO: 0.06 10*3/MM3 (ref 0–0.2)
BASOPHILS NFR BLD AUTO: 0.6 % (ref 0–1.5)
BILIRUB SERPL-MCNC: 0.2 MG/DL (ref 0.2–1.2)
BUN BLD-MCNC: 13 MG/DL (ref 6–20)
BUN/CREAT SERPL: 20.3 (ref 7.3–30)
CALCIUM SPEC-SCNC: 10.7 MG/DL (ref 8.5–10.2)
CHLORIDE SERPL-SCNC: 99 MMOL/L (ref 98–107)
CO2 SERPL-SCNC: 27.5 MMOL/L (ref 22–29)
CREAT BLD-MCNC: 0.64 MG/DL (ref 0.6–1.1)
DEPRECATED RDW RBC AUTO: 43.5 FL (ref 37–54)
EOSINOPHIL # BLD AUTO: 0.27 10*3/MM3 (ref 0–0.4)
EOSINOPHIL NFR BLD AUTO: 2.8 % (ref 0.3–6.2)
ERYTHROCYTE [DISTWIDTH] IN BLOOD BY AUTOMATED COUNT: 13.3 % (ref 12.3–15.4)
FERRITIN SERPL-MCNC: 74.1 NG/ML (ref 11–207)
GFR SERPL CREATININE-BSD FRML MDRD: 100 ML/MIN/1.73
GLOBULIN UR ELPH-MCNC: 3.4 GM/DL (ref 1.8–3.5)
GLUCOSE BLD-MCNC: 131 MG/DL (ref 74–124)
HCT VFR BLD AUTO: 41 % (ref 34–46.6)
HGB BLD-MCNC: 13.1 G/DL (ref 12–15.9)
IMM GRANULOCYTES # BLD AUTO: 0.05 10*3/MM3 (ref 0–0.05)
IMM GRANULOCYTES NFR BLD AUTO: 0.5 % (ref 0–0.5)
IRON 24H UR-MRATE: 40 MCG/DL (ref 37–145)
IRON SATN MFR SERPL: 8 % (ref 14–48)
LYMPHOCYTES # BLD AUTO: 2.31 10*3/MM3 (ref 0.7–3.1)
LYMPHOCYTES NFR BLD AUTO: 23.7 % (ref 19.6–45.3)
MCH RBC QN AUTO: 28.4 PG (ref 26.6–33)
MCHC RBC AUTO-ENTMCNC: 32 G/DL (ref 31.5–35.7)
MCV RBC AUTO: 88.9 FL (ref 79–97)
MONOCYTES # BLD AUTO: 0.52 10*3/MM3 (ref 0.1–0.9)
MONOCYTES NFR BLD AUTO: 5.3 % (ref 5–12)
NEUTROPHILS # BLD AUTO: 6.54 10*3/MM3 (ref 1.7–7)
NEUTROPHILS NFR BLD AUTO: 67.1 % (ref 42.7–76)
NRBC BLD AUTO-RTO: 0 /100 WBC (ref 0–0.2)
PLATELET # BLD AUTO: 430 10*3/MM3 (ref 140–450)
PMV BLD AUTO: 10 FL (ref 6–12)
POTASSIUM BLD-SCNC: 4.2 MMOL/L (ref 3.5–4.7)
PROT SERPL-MCNC: 8.1 G/DL (ref 6.3–8)
RBC # BLD AUTO: 4.61 10*6/MM3 (ref 3.77–5.28)
SODIUM BLD-SCNC: 141 MMOL/L (ref 134–145)
TIBC SERPL-MCNC: 521 MCG/DL (ref 249–505)
TRANSFERRIN SERPL-MCNC: 372 MG/DL (ref 200–360)
WBC NRBC COR # BLD: 9.75 10*3/MM3 (ref 3.4–10.8)

## 2019-08-06 PROCEDURE — 82728 ASSAY OF FERRITIN: CPT

## 2019-08-06 PROCEDURE — 83540 ASSAY OF IRON: CPT

## 2019-08-06 PROCEDURE — 80053 COMPREHEN METABOLIC PANEL: CPT

## 2019-08-06 PROCEDURE — 82306 VITAMIN D 25 HYDROXY: CPT | Performed by: INTERNAL MEDICINE

## 2019-08-06 PROCEDURE — 84466 ASSAY OF TRANSFERRIN: CPT

## 2019-08-06 PROCEDURE — 85025 COMPLETE CBC W/AUTO DIFF WBC: CPT

## 2019-08-06 PROCEDURE — 36415 COLL VENOUS BLD VENIPUNCTURE: CPT | Performed by: INTERNAL MEDICINE

## 2019-08-06 PROCEDURE — 99214 OFFICE O/P EST MOD 30 MIN: CPT | Performed by: INTERNAL MEDICINE

## 2019-08-06 NOTE — PROGRESS NOTES
Subjective .     REASONS FOR FOLLOWUP:    1. Stage IIA ([id number]) left breast cancer:  Status post left mastectomy with left axillary lymph node dissection 03/12/2013.  Lesion #1 invasive ductal carcinoma grade 2, 1.5 cm ER positive (90%), FL positive (1-4%), HER-2/jodi FISH negative (ratio 1.4),  Ki-67 score of 8%.  Lesion #2 invasive ductal carcinoma, grade 2, 1.7 cm,  ER positive (70%), FL negative (%),HER-2/jodi FISH positive (ratio of 3.0).  3 of 15 lymph nodes positive (2 macrometastases, 1 micrometastasis, largest lesion 2.5 cm replacing lymph node).  Lymph node evaluated and is ER positive (90%), FL positive (30%), HER-2/jodi FISH negative (ratio of 1.4).    2. BRCA-1 and 2 testing negative.  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  3. Participation in the [employer] Clinical trial.   4. Status post Mediport placement by Dr. Neumann 04/08/2013.  Mediport removed by Dr. Mabry 07/21/2014.  5. Initiation of adjuvant chemotherapy 04/16/2013.  Treatment planned:  dose-dense Adriamycin and Cytoxan with Neulasta support q.2 weeks x4 cycles.  Subsequent weekly Taxol and Herceptin x12 weeks.  Subsequent continuation of Herceptin q.3 weeks to complete one year.  Adjuvant endocrine therapy after completion of chemotherapy with tamoxifen (premenopausal).   Adjuvant radiation therapy following completion of chemotherapy.  Last dose of Taxol administered 08/27/2013. Tamoxifen initiated 09/25/2013 (plan to treat for 10 years).   Final dose of Herceptin administered 05/21/2014 (one year of treatment completed 06/11/2014).   6. 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  7. Borderline elevated transaminases. CT abdomen 10/30/2013 with normal appearing liver. Suspected steatohepatitis.   8. Left foot plantar fasciitis with stress fracture of the 4th metatarsal, status post evaluation with bone scan, 09/29/2014 and MRI of the left foot, 10/08/2014. Negative for  metastatic disease.    9. Lymphedema of the left upper extremity with compression sleeve in place and having been evaluated in the lymphedema clinic.   10. Left wrist tenosynovitis identified on MRI of 06/18/2015. Status post steroid injection by orthopedics, on 07/18/2015.   11. Anemia with hemoglobin decreased to 10.8 on 3/30/16.  Evaluation revealing evidence of iron deficiency (related to menstrual blood loss) and B12 deficiency.  Initiated oral iron sulfate 325 mg twice a day as well as B12 replacement with weekly injections ×4 and subsequent monthly injections with intended transition to oral B12 1000 µg daily, however, patient did not follow through.  Patient also discontinued oral iron.  Recurrent iron deficiency with oral iron resumed 5/4/17 twice a day dosing, again discontinued due to poor tolerance.  Administration of Feraheme 11/15 and 11/22/17.  12. Vitamin D deficiency continuing on vitamin D 800 units daily.  25 hydroxy vitamin D level normal on 8/6/2019 at 34.8.  13. Positive rheumatoid factor 4/28/17 (16.4), referred to rheumatology for further evaluation.  Initiated treatment with Plaquenil per rheumatology.  14. Status post KATE/BSO 6/5/18 (benign pathologic findings).  Transition from tamoxifen to adjuvant Arimidex 11/20/18 due to surgical postmenopausal state.  15. Osteopenia: DEXA scan 10/12/18 with maximal T score -1.4 in the lumbar spine, monitor annually on aromatase inhibitor therapy.  Calcium and vitamin D supplementation.     HISTORY OF PRESENT ILLNESS:  The patient is a 44 y.o. year old female who is here for follow-up with the above-mentioned history.    History of Present Illness the patient returns today continuing on adjuvant Arimidex 1 mg daily.  The patient has also been continuing on calcium 1200 mg and vitamin D 800 units daily.  She is taking an over-the-counter B supplement.  At her last visit, she had discussed pursuit of bariatric surgery and has been going through approval  process and at this point has surgery now scheduled for 10/30/2019 at [facility].  Since her last visit she reports continuing on calcium and vitamin D supplementation.  Her vitamin D 25-hydroxy level on 2019 was borderline low at 28.3.  At that visit she also had a borderline elevated calcium at 10.8 continuing on calcium supplementation with a suppressed intact PTH 12.8.  Recheck on 2019 was 10.1.  She also had elevated transaminases and underwent ultrasound of the liver on 2019 which showed borderline hepatomegaly with probable mild fatty infiltration.  Since her last visit she actually has done very well.  She reports that her fatigue is somewhat improved.  She has a new job working for [employer].  She is ready to proceed with her bariatric surgery.  She notes that her rheumatoid arthritis pain is under good control.    PAST MEDICAL HISTORY:    1. Hypertension.  2. History of menorrhagia status post DandC with Dr. Elda Roland 16.  Pathology showed polypoid endometrium with disordered proliferation but no evidence of malignancy.  Menstrual blood loss ceased.  3. History of phentermine use for weight loss.  4. Status post knee surgery after traumatic fracture with hardware in place in .  5. Status post  x2.  6. Status post Mediport placement by Dr. Neumann on 2013. Mediport removed by Dr. Mabry 2014.  7. Left foot plantar fasciitis with stress fracture of the left 4th metatarsal, 2014.   8. Osteopenia.   9. Vitamin D deficiency.   10. Anxiety/depression.  Hospitalization required in 10/2015 due to major depressive disorder with psychotic features.   11. Anemia secondary to iron deficiency (menstrual blood loss) and B12 deficiency.  12. Suspected early rheumatoid arthritis initiated Plaquenil under the direction of Dr. Lopez in rheumatology in May 2017.  Positive rheumatoid factor 16.4 on 17.  13. Status post right carpal tunnel release  surgery in 2017  14. Diabetes mellitus  15. Status post KATE/BSO 18    OB-GYN HISTORY:   G3, P3, A0, menarche age 10.  The patient has had regular menstrual cycles, however, has some degree of menorrhagia.  She was on oral contraceptives for 3-4 years in her late teens to early 20s. Menstrual cycles ceased with the 1st cycle of chemotherapy.     Menstrual cycles resumed in 2015. Endometrial biopsy performed due to endometrial thickening in 2015 and again 16 with benign pathology.  Recurrent severe dysfunctional uterine bleeding in early 2016 causing anemia.  D&C 16-  pathology showed polypoid endometrium with disordered proliferation but no evidence of malignancy.   Past Medical History:   Diagnosis Date   • Anxiety    • Arthritis    • Depression    • H/O Borderline vitamin D deficiency    • H/O Grade 1 peripheral neuropathy     Involving feet.   • H/O Iron deficiency anemia     Presumed secondary to menstrual loss.   • H/O Lymphedema of left upper extremity    • Hx of being hospitalized 10/2015    Due to major depressive disorder with psychotic features.   • Hypertension    • Malignant neoplasm of breast (CMS/HCC) 2013    LEFT SIDE WITH MASTECTOMY (BRCA-1 and 2 negative)   • Menorrhagia    • Osteopenia    • Plantar fasciitis     LEFT FOOT   • Rash     BILATERAL LOWER LEGS CAUSED BY CHEMO   • Sleep apnea     USES CPAP   • Tenosynovitis of left wrist    • Vitamin D deficiency     BORDERLINE     Past Surgical History:   Procedure Laterality Date   •  SECTION  , 2002    x2   • D&C HYSTEROSCOPY N/A 2016    Procedure: DILATATION AND CURETTAGE HYSTEROSCOPY;  Surgeon: Elda Roland MD;  [place] [facility] [place] [facility] [place]  [place]:    • KNEE SURGERY Right     After traumatic fracture with hardware in place.   • MEDIPORT INSERTION, DOUBLE Right    • MEDIPORT REMOVAL Right 2014    By Dr. Mabry.   • MODIFIED RADICAL MASTECTOMY W/ AXILLARY LYMPH NODE DISSECTION  Left 03/2013   • TOTAL LAPAROSCOPIC HYSTERECTOMY WITH TRANSVAGINAL TAPING Bilateral 6/5/2018    Procedure: TOTAL LAPAROSCOPIC HYSTERECTOMY SHA SALPNGO OOPHORECTOMY TENSION FREE VAGINAL TAPING CYSTOSCOPY;  Surgeon: Jocelynn Zimmerman MD;  Location: [facility];  Service: Gynecology     ONCOLOGIC HISTORY:  (History from previous dates can be found in the separate document.)    Current Outpatient Medications on File Prior to Visit   Medication Sig Dispense Refill   • anastrozole (ARIMIDEX) 1 MG tablet Take  by mouth Daily.     • cholecalciferol (VITAMIN D3) 1000 UNITS tablet Take 1,000 Units by mouth daily.     • Cyanocobalamin (B-12) 1000 MCG capsule Take 1 capsule by mouth Daily.     • escitalopram (LEXAPRO) 20 MG tablet Take 20 mg by mouth daily.     • hydroxychloroquine (PLAQUENIL) 200 MG tablet Take 400 mg by mouth Daily.     • ibuprofen (ADVIL,MOTRIN) 600 MG tablet Take 1 tablet by mouth Every 6 (Six) Hours As Needed for Mild Pain  or Moderate Pain . 60 tablet 0   • LORazepam (ATIVAN) 1 MG tablet Take 1 mg by mouth 3 (three) times a day.     • losartan-hydrochlorothiazide (HYZAAR) 100-25 MG per tablet Take 1 tablet by mouth Every Night.     • metoprolol succinate XL (TOPROL-XL) 50 MG 24 hr tablet Take 50 mg by mouth Every Night.     • TRULICITY 0.75 MG/0.5ML solution pen-injector      • vitamin D (ERGOCALCIFEROL) 56790 UNITS capsule capsule Take 1 capsule by mouth 1 (One) Time Per Week. For 8 weeks 8 capsule 0   • docusate sodium (COLACE) 250 MG capsule Take 1 capsule by mouth 2 (Two) Times a Day As Needed for Constipation. 30 capsule 0   • ferrous sulfate 325 (65 FE) MG tablet Take 325 mg by mouth 2 (two) times a day.     • fluticasone (FLONASE) 50 MCG/ACT nasal spray 1 spray into each nostril As Needed for Allergies.     • HYDROcodone-acetaminophen (NORCO) 5-325 MG per tablet Take 1 tablet by mouth Every 6 (Six) Hours As Needed for Severe Pain . 30 tablet 0   • Loratadine 10 MG capsule Take 10 mg by mouth Daily.      • metFORMIN (GLUCOPHAGE) 500 MG tablet Take 500 mg by mouth Daily With Breakfast.     • miconazole (MICONAZOLE ANTIFUNGAL) 2 % cream Apply  topically to the appropriate area as directed Every 12 (Twelve) Hours. 35 g 1   • naproxen (NAPROSYN) 500 MG tablet Take 500 mg by mouth As Needed for Moderate Pain .       Current Facility-Administered Medications on File Prior to Visit   Medication Dose Route Frequency Provider Last Rate Last Dose   • cyanocobalamin injection 1,000 mcg  1,000 mcg Intramuscular Once Glenn Lyon Jr., MD           ALLERGIES:     Allergies   Allergen Reactions   • Shellfish-Derived Products GI Intolerance   • Shrimp (Diagnostic) Nausea And Vomiting   • Adhesive Tape Rash       SOCIAL HISTORY:  The patient is  and lives with her  in [place], [place].  She works as a teacher.  She denies history of smoking, denies significant alcohol use.    Social History     Socioeconomic History   • Marital status:      Spouse name: Jonathan   • Number of children: 3   • Years of education: College   • Highest education level: Not on file   Occupational History   • Occupation: Teacher     Employer: [employer]     Comment: Worked in marketing in past.   Tobacco Use   • Smoking status: Never Smoker   • Smokeless tobacco: Never Used   Substance and Sexual Activity   • Alcohol use: Yes     Comment: Light (less than 1 per week)   • Drug use: No   • Sexual activity: Defer   Social History Narrative    Enjoys family activities, sports, 4-H leader/[employer]. College education.       FAMILY HISTORY:  Maternal grandmother had breast cancer in her 70s; maternal grandmother’s sister had ovarian cancer in her 70s, and another sister had non-Hodgkin's lymphoma in her 70s.  Maternal grandfather had prostate cancer late in life, and his sister had ovarian cancer later in life.  On paternal side there is a history of diabetes mellitus.  A sister has had issues with anemia and easy bruising.  The  patient was seen in Genetics Clinic and underwent BRCA-1 and 2 testing prior to surgery which returned negative.      Family History   Problem Relation Age of Onset   • Cancer Other    • Stroke Other    • Diabetes Other    • Hypertension Other    • Other Other         cardiac disorder   • Ovarian cancer Other    • Lymphoma Other         NON-HODGKIN   • Diabetes insipidus Other    • Anemia Other    • Mental illness Other         Grandfather   • Arthritis Other    • Heart disease Other    • Breast cancer Maternal Grandmother         In her 70s.   • Prostate cancer Maternal Grandfather    • Other Sister         Anemia and easy bruising   • Hypertension Sister    • Hypertension Mother    • Mental illness Mother    • Hypertension Father    • Diabetes Father    • Malig Hyperthermia Neg Hx        Review of Systems   Constitutional: Positive for fatigue. Negative for activity change, appetite change, fever and unexpected weight change.   HENT: Negative for congestion, dental problem, mouth sores, nosebleeds and voice change.    Respiratory: Negative for cough and shortness of breath.    Cardiovascular: Negative for chest pain, palpitations and leg swelling.   Gastrointestinal: Negative for abdominal distention, abdominal pain, blood in stool, constipation, diarrhea, nausea and vomiting.   Endocrine: Negative for cold intolerance and heat intolerance.   Genitourinary: Negative for difficulty urinating, dysuria, frequency, hematuria and menstrual problem.   Musculoskeletal: Positive for arthralgias. Negative for back pain, joint swelling and myalgias.   Skin: Negative for rash.   Neurological: Negative for dizziness, syncope, weakness, light-headedness, numbness and headaches.   Hematological: Negative for adenopathy. Does not bruise/bleed easily.   Psychiatric/Behavioral: Negative for confusion, dysphoric mood and sleep disturbance. The patient is not nervous/anxious.          Objective      Vitals:    08/06/19 1633   BP:  139/73   Pulse: 108   Resp: 16   Temp: 98.5 °F (36.9 °C)      Current Status 8/6/2019   ECOG score 0   Pain 0/10    Physical Exam   Constitutional: She is oriented to person, place, and time. She appears well-developed and well-nourished.   HENT:   Mouth/Throat: Oropharynx is clear and moist.   Eyes: Conjunctivae are normal.   Neck: No thyromegaly present.   Cardiovascular: Normal rate and regular rhythm. Exam reveals no gallop and no friction rub.   No murmur heard.  Pulmonary/Chest: Effort normal and breath sounds normal. No respiratory distress. She has no wheezes.   Breast exam not performed today.   Abdominal: Soft. Bowel sounds are normal. She exhibits no distension. There is no tenderness.   Musculoskeletal: She exhibits no edema.   Lymphadenopathy:        Head (right side): No submandibular adenopathy present.     She has no cervical adenopathy.     She has no axillary adenopathy.        Right: No inguinal and no supraclavicular adenopathy present.        Left: No inguinal and no supraclavicular adenopathy present.   Neurological: She is alert and oriented to person, place, and time. She displays normal reflexes. No cranial nerve deficit. She exhibits normal muscle tone.   Skin: Skin is warm and dry. No rash noted.   Psychiatric: She has a normal mood and affect. Her behavior is normal.       RECENT LABS:  Hematology WBC   Date Value Ref Range Status   08/06/2019 9.75 3.40 - 10.80 10*3/mm3 Final     RBC   Date Value Ref Range Status   08/06/2019 4.61 3.77 - 5.28 10*6/mm3 Final     Hemoglobin   Date Value Ref Range Status   08/06/2019 13.1 12.0 - 15.9 g/dL Final     Hematocrit   Date Value Ref Range Status   08/06/2019 41.0 34.0 - 46.6 % Final     Platelets   Date Value Ref Range Status   08/06/2019 430 140 - 450 10*3/mm3 Final        Lab Results   Component Value Date    GLUCOSE 131 (H) 08/06/2019    BUN 13 08/06/2019    CREATININE 0.64 08/06/2019    EGFRIFNONA 100 08/06/2019    BCR 20.3 08/06/2019    CO2  27.5 08/06/2019    CALCIUM 10.7 (C) 08/06/2019    ALBUMIN 4.70 08/06/2019    AST 37 (H) 08/06/2019    ALT 50 (H) 08/06/2019     Reviewed hepatic ultrasound 5/28/2019 as outlined above and below    Additional labs today: Iron 40, ferritin 74.1, iron saturation 8%, TIBC 521, 25 hydroxy vitamin D 34.8,    Assessment/Plan     1. Stage IIA ([id number]) left breast cancer:   The patient had 2 primary lesions of the left breast, the first ER positive, CO borderline, HER-2/jodi negative. The second was ER positive, CO negative, HER-2/jodi positive. She had 3 of 15 lymph nodes positive. She received adjuvant dose-dense chemotherapy with AC x4 cycles followed by weekly Taxol x12 doses completed on 08/27/2013. She did receive Herceptin in conjunction with the Taxol and then completed a total of a year with last dose of Herceptin received on 05/21/2014 (3-week dose). She did initiate adjuvant tamoxifen on 09/25/2013 and we anticipate likely a 10-year course of treatment. BRCA-1 and 2 testing negative at time of diagnosis.  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    Due to continued menorrhagia she underwent KATE/BSO on 6/5/18 with benign findings.  DEXA scan 10/12/18 with evidence of osteopenia with maximal T score -1.4 the lumbar spine.  Since the patient was rendered surgically postmenopausal with only mild osteopenia, she was transitioned from tamoxifen to adjuvant Arimidex on 11/21/18 with intent to complete 10 years of total adjuvant endocrine therapy (from time of initiation of tamoxifen 9/25/13).    Patient returns today in follow-up tolerating Arimidex extremely well.  At this point she is not experiencing any significant side effects from her Arimidex.  She has no clinical evidence of recurrent disease.  Breast exam at the last visit was negative and we anticipate follow-up breast exam when she returns here in October.  She will be due for annual mammogram in October just before her follow-up visit  and we will schedule that for her.    2. Depression/anxiety with prior psychotic episode: The patient required hospitalization in the behavioral health unit in October 2015. She has had difficulties with anxiety and depression in the past, however, given her breast cancer diagnosis as well as stressors from her job and family, it appears that these precipitated the event.  She has stabilized on Lexapro at 20 mg per day.   3. Rheumatoid arthritis: The patient was experiencing diffuse arthralgias as well as pain in her wrists right greater than left.  We did perform an evaluation 4/28/17 with negative MARYSE panel however rheumatoid factor was positive at 16.4.  She was referred to rheumatology Dr. Lopez in May 2017. The patient was felt to have early autoimmune disorder/rheumatoid arthritis.  She was started on Plaquenil.  Symptoms have stabilized and improved over time.  She does continue routine follow-up with rheumatology.    4. Vitamin D deficiency: The patient has been on and off of vitamin D supplementation in the past.  Recently, she had a borderline low 25 hydroxy vitamin D level on 5/14/2019 at 28.3 and was taking vitamin D 400 units over-the-counter daily.  Since then she has increase this to 800 units daily with improvement in her level today up to 34.8.  5. Anemia: The patient developed significant anemia with a hemoglobin down to 10.8 on 3/30/16.  Evaluation revealed iron deficiency with a ferritin of 12.5 iron percent saturation of 5 and a TIBC 535.  She started oral iron sulfate 325 mg twice a day and was tolerating this very well.  Her hemoglobin improved into the 12 range.  Her iron studies showed a significant improvement previously in October 2016 with ferritin up to 54, iron saturation 16%.  She was asked to continue on oral iron twice a day however she did not do so.  The patient also was found to have B12 deficiency with a level of 187 on 3/30/16.  She started B12 replacement and received weekly  dosing ×4 followed bimonthly injections, last received 7/22/16.  As it was difficult for her to come in to the office for B12 injections, she was asked to begin oral B12 1000 µg daily however she did not do this.  On 4/28/17, she was mildly anemic with a hemoglobin of 11.4.  She had a thrombocytosis as well, related to iron deficiency.  Repeat iron studies 4/28/17 showed a ferritin that declined to 42, iron saturation low at 7%, elevated TIBC at 491. She was asked to resume oral iron twice daily.  B12 level 4/28/17 had remained in the low normal range at 370 and was monitored.  On 7/14/17, B12 level was again low normal at 299 if she was asked to resume oral B12 however she did not do so.  She was intolerant of oral iron with diarrhea and abdominal cramping.  Labs on 11/8/17 showed a declining iron saturation 9% and a slight decline in ferritin down to 39 with a TIBC at the 501.  Her hemoglobin was up to 12.8.  Oral iron was discontinued 11/8/17 and she proceeded on with Feraheme on 11/15/17 and 11/22/17.  She also resumed oral B12 daily.  The patient had recurrent menorrhagia but underwent KATE/BSO on 6/5/18.  B12 level on 5/14/2019 was 1053.  Today, patient has a hemoglobin of 13.1.  We did recheck her iron studies with normal but slightly diminished ferritin at 74.1, low iron saturation at 8%, TIBC of 521.  She is not able to tolerate oral iron however does not yet need additional IV iron.  We will continue to monitor her iron status.  With upcoming gastric bypass this may become more of an issue in the future.  6. Elevated transaminases with evidence of hepatic steatosis: The patient has had intermittent mild elevation in transaminases.  Viral hepatitis panel -5/14/2019.  Hepatic ultrasound 5/28/2019 with borderline hepatomegaly with probable mild fatty infiltration with increased echotexture.  Transaminases today remain mildly elevated with ALT 50, AST 37.  7. Left upper extremity lymphedema: The patient does  wear her sleeve intermittently, is not wearing it today.  Lymphedema is stable.  8. Osteopenia: Previous DEXA scan 3/5/14 osteopenia with maximal T score of -1.1 in the lumbar spine.  Repeat DEXA scan in 10/12/18 with maximal T score -1.4 lumbar spine showing worsening since 2014.  Aromatase inhibitor therapy initiated with Arimidex 1 mg daily on 11/20/18, plan annual DEXA scan while on aromatase inhibitor therapy, due next in October 2019.  We will schedule DEXA scan prior to her next visit in October 2019.  She will continue on calcium and vitamin D supplementation.  9. Hypercalcemia: The patient had hypercalcemia on 5/14/2019 with calcium 10.8, albumin 4.6.  Intact PTH was suppressed at 12.8.  Suspected to be related to exogenous calcium supplementation area did currently with calcium 10.7 receiving calcium 1200 mg daily.  We will continue to monitor this for now.  10. Diarrhea: The patient reports that her bowel function has improved and normalized.  11. Obesity: The patient is proceeding with gastric bypass at [facility] scheduled for 10/30/2019.    PLAN:    1. Continue adjuvant Arimidex 1 mg daily  2. Continue calcium 1200 mg and vitamin D 800 units daily supplementation  3. Continue oral B12 supplement daily  4. Schedule annual right-sided mammogram in October 2019  5. Schedule one year follow-up DEXA scan in October 2019  6. MD visit in late October 2019 with CBC, CMP.

## 2019-09-16 RX ORDER — ANASTROZOLE 1 MG/1
TABLET ORAL
Qty: 30 TABLET | Refills: 4 | Status: SHIPPED | OUTPATIENT
Start: 2019-09-16 | End: 2020-04-06

## 2019-09-30 ENCOUNTER — TELEPHONE (OUTPATIENT)
Dept: ONCOLOGY | Facility: CLINIC | Age: 44
End: 2019-09-30

## 2019-09-30 NOTE — TELEPHONE ENCOUNTER
Left message for patient saying that the letter she requested for the mattress give away has been completed and is ready for her to  at the .

## 2019-10-04 ENCOUNTER — TELEPHONE (OUTPATIENT)
Dept: ONCOLOGY | Facility: CLINIC | Age: 44
End: 2019-10-04

## 2019-10-04 NOTE — TELEPHONE ENCOUNTER
Called patient to reassure her that I [phone] the fax containing her application for the tempur pedic [email or web address]

## 2019-10-18 ENCOUNTER — HOSPITAL ENCOUNTER (OUTPATIENT)
Dept: BONE DENSITY | Facility: HOSPITAL | Age: 44
Discharge: HOME | End: 2019-10-18
Admitting: INTERNAL MEDICINE

## 2019-10-18 DIAGNOSIS — Z17.0 MALIGNANT NEOPLASM OF OVERLAPPING SITES OF LEFT BREAST IN FEMALE, ESTROGEN RECEPTOR POSITIVE (HCC): ICD-10-CM

## 2019-10-18 DIAGNOSIS — C50.812 MALIGNANT NEOPLASM OF OVERLAPPING SITES OF LEFT BREAST IN FEMALE, ESTROGEN RECEPTOR POSITIVE (HCC): ICD-10-CM

## 2019-10-18 DIAGNOSIS — M85.89 OSTEOPENIA OF MULTIPLE SITES: ICD-10-CM

## 2019-10-18 DIAGNOSIS — D51.8 OTHER VITAMIN B12 DEFICIENCY ANEMIA: ICD-10-CM

## 2019-10-18 DIAGNOSIS — E55.9 VITAMIN D DEFICIENCY: ICD-10-CM

## 2019-10-18 DIAGNOSIS — D50.8 OTHER IRON DEFICIENCY ANEMIA: ICD-10-CM

## 2019-10-18 PROCEDURE — 77080 DXA BONE DENSITY AXIAL: CPT

## 2019-10-24 ENCOUNTER — APPOINTMENT (OUTPATIENT)
Dept: WOMENS IMAGING | Facility: HOSPITAL | Age: 44
End: 2019-10-24

## 2019-10-24 PROCEDURE — 77063 BREAST TOMOSYNTHESIS BI: CPT | Performed by: RADIOLOGY

## 2019-10-24 PROCEDURE — 77067 SCR MAMMO BI INCL CAD: CPT | Performed by: RADIOLOGY

## 2019-10-25 ENCOUNTER — LAB (OUTPATIENT)
Dept: LAB | Facility: HOSPITAL | Age: 44
End: 2019-10-25

## 2019-10-25 ENCOUNTER — OFFICE VISIT (OUTPATIENT)
Dept: ONCOLOGY | Facility: CLINIC | Age: 44
End: 2019-10-25

## 2019-10-25 VITALS
TEMPERATURE: 98.6 F | WEIGHT: 293 LBS | BODY MASS INDEX: 48.82 KG/M2 | OXYGEN SATURATION: 97 % | SYSTOLIC BLOOD PRESSURE: 127 MMHG | RESPIRATION RATE: 18 BRPM | DIASTOLIC BLOOD PRESSURE: 58 MMHG | HEIGHT: 65 IN | HEART RATE: 101 BPM

## 2019-10-25 DIAGNOSIS — D51.8 OTHER VITAMIN B12 DEFICIENCY ANEMIA: ICD-10-CM

## 2019-10-25 DIAGNOSIS — M85.89 OSTEOPENIA OF MULTIPLE SITES: ICD-10-CM

## 2019-10-25 DIAGNOSIS — Z17.0 MALIGNANT NEOPLASM OF OVERLAPPING SITES OF LEFT BREAST IN FEMALE, ESTROGEN RECEPTOR POSITIVE (HCC): Primary | ICD-10-CM

## 2019-10-25 DIAGNOSIS — E55.9 VITAMIN D DEFICIENCY: ICD-10-CM

## 2019-10-25 DIAGNOSIS — C50.812 MALIGNANT NEOPLASM OF OVERLAPPING SITES OF LEFT BREAST IN FEMALE, ESTROGEN RECEPTOR POSITIVE (HCC): ICD-10-CM

## 2019-10-25 DIAGNOSIS — D50.8 OTHER IRON DEFICIENCY ANEMIA: ICD-10-CM

## 2019-10-25 DIAGNOSIS — Z17.0 MALIGNANT NEOPLASM OF OVERLAPPING SITES OF LEFT BREAST IN FEMALE, ESTROGEN RECEPTOR POSITIVE (HCC): ICD-10-CM

## 2019-10-25 DIAGNOSIS — C50.812 MALIGNANT NEOPLASM OF OVERLAPPING SITES OF LEFT BREAST IN FEMALE, ESTROGEN RECEPTOR POSITIVE (HCC): Primary | ICD-10-CM

## 2019-10-25 LAB
ALBUMIN SERPL-MCNC: 4.4 G/DL (ref 3.5–5.2)
ALBUMIN/GLOB SERPL: 1.3 G/DL (ref 1.1–2.4)
ALP SERPL-CCNC: 117 U/L (ref 38–116)
ALT SERPL W P-5'-P-CCNC: 21 U/L (ref 0–33)
ANION GAP SERPL CALCULATED.3IONS-SCNC: 16.2 MMOL/L (ref 5–15)
AST SERPL-CCNC: 20 U/L (ref 0–32)
BASOPHILS # BLD AUTO: 0.04 10*3/MM3 (ref 0–0.2)
BASOPHILS NFR BLD AUTO: 0.4 % (ref 0–1.5)
BILIRUB SERPL-MCNC: 0.2 MG/DL (ref 0.2–1.2)
BUN BLD-MCNC: 15 MG/DL (ref 6–20)
BUN/CREAT SERPL: 25 (ref 7.3–30)
CALCIUM SPEC-SCNC: 10 MG/DL (ref 8.5–10.2)
CHLORIDE SERPL-SCNC: 96 MMOL/L (ref 98–107)
CO2 SERPL-SCNC: 25.8 MMOL/L (ref 22–29)
CREAT BLD-MCNC: 0.6 MG/DL (ref 0.6–1.1)
DEPRECATED RDW RBC AUTO: 42.6 FL (ref 37–54)
EOSINOPHIL # BLD AUTO: 0.22 10*3/MM3 (ref 0–0.4)
EOSINOPHIL NFR BLD AUTO: 2 % (ref 0.3–6.2)
ERYTHROCYTE [DISTWIDTH] IN BLOOD BY AUTOMATED COUNT: 13.1 % (ref 12.3–15.4)
GFR SERPL CREATININE-BSD FRML MDRD: 108 ML/MIN/1.73
GLOBULIN UR ELPH-MCNC: 3.5 GM/DL (ref 1.8–3.5)
GLUCOSE BLD-MCNC: 212 MG/DL (ref 74–124)
HCT VFR BLD AUTO: 40.3 % (ref 34–46.6)
HGB BLD-MCNC: 12.5 G/DL (ref 12–15.9)
IMM GRANULOCYTES # BLD AUTO: 0.05 10*3/MM3 (ref 0–0.05)
IMM GRANULOCYTES NFR BLD AUTO: 0.5 % (ref 0–0.5)
LYMPHOCYTES # BLD AUTO: 2.63 10*3/MM3 (ref 0.7–3.1)
LYMPHOCYTES NFR BLD AUTO: 24.2 % (ref 19.6–45.3)
MCH RBC QN AUTO: 27.7 PG (ref 26.6–33)
MCHC RBC AUTO-ENTMCNC: 31 G/DL (ref 31.5–35.7)
MCV RBC AUTO: 89.4 FL (ref 79–97)
MONOCYTES # BLD AUTO: 0.46 10*3/MM3 (ref 0.1–0.9)
MONOCYTES NFR BLD AUTO: 4.2 % (ref 5–12)
NEUTROPHILS # BLD AUTO: 7.47 10*3/MM3 (ref 1.7–7)
NEUTROPHILS NFR BLD AUTO: 68.7 % (ref 42.7–76)
NRBC BLD AUTO-RTO: 0 /100 WBC (ref 0–0.2)
PLATELET # BLD AUTO: 398 10*3/MM3 (ref 140–450)
PMV BLD AUTO: 10.1 FL (ref 6–12)
POTASSIUM BLD-SCNC: 3.5 MMOL/L (ref 3.5–4.7)
PROT SERPL-MCNC: 7.9 G/DL (ref 6.3–8)
RBC # BLD AUTO: 4.51 10*6/MM3 (ref 3.77–5.28)
SODIUM BLD-SCNC: 138 MMOL/L (ref 134–145)
WBC NRBC COR # BLD: 10.87 10*3/MM3 (ref 3.4–10.8)

## 2019-10-25 PROCEDURE — 80053 COMPREHEN METABOLIC PANEL: CPT

## 2019-10-25 PROCEDURE — 99214 OFFICE O/P EST MOD 30 MIN: CPT | Performed by: INTERNAL MEDICINE

## 2019-10-25 PROCEDURE — 36415 COLL VENOUS BLD VENIPUNCTURE: CPT

## 2019-10-25 PROCEDURE — G0463 HOSPITAL OUTPT CLINIC VISIT: HCPCS | Performed by: INTERNAL MEDICINE

## 2019-10-25 PROCEDURE — 85025 COMPLETE CBC W/AUTO DIFF WBC: CPT

## 2019-10-25 NOTE — PROGRESS NOTES
Subjective .     REASONS FOR FOLLOWUP:    1. Stage IIA ([id number]) left breast cancer:  Status post left mastectomy with left axillary lymph node dissection 03/12/2013.  Lesion #1 invasive ductal carcinoma grade 2, 1.5 cm ER positive (90%), WA positive (1-4%), HER-2/jodi FISH negative (ratio 1.4),  Ki-67 score of 8%.  Lesion #2 invasive ductal carcinoma, grade 2, 1.7 cm,  ER positive (70%), WA negative (%),HER-2/jodi FISH positive (ratio of 3.0).  3 of 15 lymph nodes positive (2 macrometastases, 1 micrometastasis, largest lesion 2.5 cm replacing lymph node).  Lymph node evaluated and is ER positive (90%), WA positive (30%), HER-2/jodi FISH negative (ratio of 1.4).    2. BRCA-1 and 2 testing negative.  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  3. Participation in the [employer] Clinical trial.   4. Status post Mediport placement by Dr. Neumann 04/08/2013.  Mediport removed by Dr. Mabry 07/21/2014.  5. Initiation of adjuvant chemotherapy 04/16/2013.  Treatment planned:  dose-dense Adriamycin and Cytoxan with Neulasta support q.2 weeks x4 cycles.  Subsequent weekly Taxol and Herceptin x12 weeks.  Subsequent continuation of Herceptin q.3 weeks to complete one year.  Adjuvant endocrine therapy after completion of chemotherapy with tamoxifen (premenopausal).   Adjuvant radiation therapy following completion of chemotherapy.  Last dose of Taxol administered 08/27/2013. Tamoxifen initiated 09/25/2013 (plan to treat for 10 years).   Final dose of Herceptin administered 05/21/2014 (one year of treatment completed 06/11/2014).   6. 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  7. Borderline elevated transaminases. CT abdomen 10/30/2013 with normal appearing liver. Suspected steatohepatitis.   8. Left foot plantar fasciitis with stress fracture of the 4th metatarsal, status post evaluation with bone scan, 09/29/2014 and MRI of the left foot, 10/08/2014. Negative for  metastatic disease.    9. Lymphedema of the left upper extremity with compression sleeve in place and having been evaluated in the lymphedema clinic.   10. Left wrist tenosynovitis identified on MRI of 06/18/2015. Status post steroid injection by orthopedics, on 07/18/2015.   11. Anemia with hemoglobin decreased to 10.8 on 3/30/16.  Evaluation revealing evidence of iron deficiency (related to menstrual blood loss) and B12 deficiency.  Initiated oral iron sulfate 325 mg twice a day as well as B12 replacement with weekly injections ×4 and subsequent monthly injections with intended transition to oral B12 1000 µg daily, however, patient did not follow through.  Patient also discontinued oral iron.  Recurrent iron deficiency with oral iron resumed 5/4/17 twice a day dosing, again discontinued due to poor tolerance.  Administration of Feraheme 11/15 and 11/22/17.  12. Vitamin D deficiency continuing on vitamin D 800 units daily.  25 hydroxy vitamin D level normal on 8/6/2019 at 34.8.  13. Positive rheumatoid factor 4/28/17 (16.4), referred to rheumatology for further evaluation.  Initiated treatment with Plaquenil per rheumatology.  14. Status post KATE/BSO 6/5/18 (benign pathologic findings).  Transition from tamoxifen to adjuvant Arimidex 11/20/18 due to surgical postmenopausal state.  15. Osteopenia: DEXA scan 10/12/18 with maximal T score -1.4 in the lumbar spine, monitor annually on aromatase inhibitor therapy.  Calcium and vitamin D supplementation.     HISTORY OF PRESENT ILLNESS:  The patient is a 44 y.o. year old female who is here for follow-up with the above-mentioned history.    History of Present Illness the patient returns today continuing on adjuvant Arimidex 1 mg daily.  The patient has also been continuing on calcium 1200 mg and vitamin D 800 units daily.  She is taking an over-the-counter B supplement.  She is here today with DEXA scan to review in 1 year interval.  She did undergo her annual mammogram  "however it was just performed yesterday and report is not available.  The patient notes that her gastric bypass surgery has been delayed slightly, scheduled for 2019 at [facility].  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  She notes recent GI evaluation, having undergone EGD with identification of gastritis and ulcer formation.  She was treated with Carafate and omeprazole and underwent on 10/14/2019 repeat scope showing these issues to have resolved however there was evidence of a hiatal hernia.  Is unclear whether that will require correction during her upcoming surgery.  She does have a complaint of pruritus in her anterior mid abdominal wall with some associated intermittent \"whelps\".  She does not note any new detergents or clothing.    PAST MEDICAL HISTORY:    1. Hypertension.  2. History of menorrhagia status post DandC with Dr. Elda Roland 16.  Pathology showed polypoid endometrium with disordered proliferation but no evidence of malignancy.  Menstrual blood loss ceased.  3. History of phentermine use for weight loss.  4. Status post knee surgery after traumatic fracture with hardware in place in .  5. Status post  x2.  6. Status post Mediport placement by Dr. Neumann on 2013. Mediport removed by Dr. Mabry 2014.  7. Left foot plantar fasciitis with stress fracture of the left 4th metatarsal, 2014.   8. Osteopenia.   9. Vitamin D deficiency.   10. Anxiety/depression.  Hospitalization required in 10/2015 due to major depressive disorder with psychotic features.   11. Anemia secondary to iron deficiency (menstrual blood loss) and B12 deficiency.  12. Suspected early rheumatoid arthritis initiated Plaquenil under the direction of Dr. Lopez in rheumatology in May 2017.  Positive rheumatoid factor 16.4 on 17.  13. Status post right carpal tunnel release surgery in 2017  14. Diabetes mellitus  15. Status " post KATE/BSO 18    OB-GYN HISTORY:   G3, P3, A0, menarche age 10.  The patient has had regular menstrual cycles, however, has some degree of menorrhagia.  She was on oral contraceptives for 3-4 years in her late teens to early 20s. Menstrual cycles ceased with the 1st cycle of chemotherapy.     Menstrual cycles resumed in 2015. Endometrial biopsy performed due to endometrial thickening in 2015 and again 16 with benign pathology.  Recurrent severe dysfunctional uterine bleeding in early 2016 causing anemia.  D&C 16-  pathology showed polypoid endometrium with disordered proliferation but no evidence of malignancy.   Past Medical History:   Diagnosis Date   • Anxiety    • Arthritis    • Depression    • H/O Borderline vitamin D deficiency    • H/O Grade 1 peripheral neuropathy     Involving feet.   • H/O Iron deficiency anemia     Presumed secondary to menstrual loss.   • H/O Lymphedema of left upper extremity    • Hx of being hospitalized 10/2015    Due to major depressive disorder with psychotic features.   • Hypertension    • Malignant neoplasm of breast (CMS/HCC) 2013    LEFT SIDE WITH MASTECTOMY (BRCA-1 and 2 negative)   • Menorrhagia    • Osteopenia    • Plantar fasciitis     LEFT FOOT   • Rash     BILATERAL LOWER LEGS CAUSED BY CHEMO   • Sleep apnea     USES CPAP   • Tenosynovitis of left wrist    • Vitamin D deficiency     BORDERLINE     Past Surgical History:   Procedure Laterality Date   •  SECTION  , 2002    x2   • D&C HYSTEROSCOPY N/A 2016    Procedure: DILATATION AND CURETTAGE HYSTEROSCOPY;  Surgeon: Elda Roland MD;  Location: [facility];  Service:    • KNEE SURGERY Right     After traumatic fracture with hardware in place.   • MEDIPORT INSERTION, DOUBLE Right    • MEDIPORT REMOVAL Right 2014    By Dr. Mabry.   • MODIFIED RADICAL MASTECTOMY W/ AXILLARY LYMPH NODE DISSECTION Left 2013   • TOTAL LAPAROSCOPIC HYSTERECTOMY WITH  TRANSVAGINAL TAPING Bilateral 6/5/2018    Procedure: TOTAL LAPAROSCOPIC HYSTERECTOMY SHA SALPNGO OOPHORECTOMY TENSION FREE VAGINAL TAPING CYSTOSCOPY;  Surgeon: Jocelynn Zimmerman MD;  Location: [facility];  Service: Gynecology     ONCOLOGIC HISTORY:  (History from previous dates can be found in the separate document.)    Current Outpatient Medications on File Prior to Visit   Medication Sig Dispense Refill   • anastrozole (ARIMIDEX) 1 MG tablet TAKE ONE TABLET BY MOUTH DAILY 30 tablet 4   • cholecalciferol (VITAMIN D3) 1000 UNITS tablet Take 1,000 Units by mouth daily.     • Cyanocobalamin (B-12) 1000 MCG capsule Take 1 capsule by mouth Daily.     • escitalopram (LEXAPRO) 20 MG tablet Take 20 mg by mouth daily.     • hydroxychloroquine (PLAQUENIL) 200 MG tablet Take 400 mg by mouth Daily.     • ibuprofen (ADVIL,MOTRIN) 600 MG tablet Take 1 tablet by mouth Every 6 (Six) Hours As Needed for Mild Pain  or Moderate Pain . 60 tablet 0   • LORazepam (ATIVAN) 1 MG tablet Take 1 mg by mouth 3 (three) times a day.     • losartan-hydrochlorothiazide (HYZAAR) 100-25 MG per tablet Take 1 tablet by mouth Every Night.     • metoprolol succinate XL (TOPROL-XL) 50 MG 24 hr tablet Take 50 mg by mouth Every Night.     • TRULICITY 0.75 MG/0.5ML solution pen-injector      • vitamin D (ERGOCALCIFEROL) 94511 UNITS capsule capsule Take 1 capsule by mouth 1 (One) Time Per Week. For 8 weeks 8 capsule 0   • Loratadine 10 MG capsule Take 10 mg by mouth Daily.     • [DISCONTINUED] anastrozole (ARIMIDEX) 1 MG tablet Take  by mouth Daily.     • [DISCONTINUED] docusate sodium (COLACE) 250 MG capsule Take 1 capsule by mouth 2 (Two) Times a Day As Needed for Constipation. 30 capsule 0   • [DISCONTINUED] ferrous sulfate 325 (65 FE) MG tablet Take 325 mg by mouth 2 (two) times a day.     • [DISCONTINUED] fluticasone (FLONASE) 50 MCG/ACT nasal spray 1 spray into each nostril As Needed for Allergies.     • [DISCONTINUED] HYDROcodone-acetaminophen  (NORCO) 5-325 MG per tablet Take 1 tablet by mouth Every 6 (Six) Hours As Needed for Severe Pain . 30 tablet 0   • [DISCONTINUED] metFORMIN (GLUCOPHAGE) 500 MG tablet Take 500 mg by mouth Daily With Breakfast.     • [DISCONTINUED] miconazole (MICONAZOLE ANTIFUNGAL) 2 % cream Apply  topically to the appropriate area as directed Every 12 (Twelve) Hours. 35 g 1   • [DISCONTINUED] naproxen (NAPROSYN) 500 MG tablet Take 500 mg by mouth As Needed for Moderate Pain .       Current Facility-Administered Medications on File Prior to Visit   Medication Dose Route Frequency Provider Last Rate Last Dose   • cyanocobalamin injection 1,000 mcg  1,000 mcg Intramuscular Once Glenn Lyon Jr., MD           ALLERGIES:     Allergies   Allergen Reactions   • Shellfish-Derived Products GI Intolerance   • Shrimp (Diagnostic) Nausea And Vomiting   • Adhesive Tape Rash       SOCIAL HISTORY:  The patient is  and lives with her  in [place], [place].  She works as a teacher.  She denies history of smoking, denies significant alcohol use.    Social History     Socioeconomic History   • Marital status:      Spouse name: Jonathan   • Number of children: 3   • Years of education: College   • Highest education level: Not on file   Occupational History   • Occupation: Teacher     Employer: [employer]     Comment: Worked in marketing in past.   Tobacco Use   • Smoking status: Never Smoker   • Smokeless tobacco: Never Used   Substance and Sexual Activity   • Alcohol use: Yes     Comment: Light (less than 1 per week)   • Drug use: No   • Sexual activity: Defer   Social History Narrative    Enjoys family activities, sports, 4-[employer] leader/[employer]. College education.       FAMILY HISTORY:  Maternal grandmother had breast cancer in her 70s; maternal grandmother’s sister had ovarian cancer in her 70s, and another sister had non-Hodgkin's lymphoma in her 70s.  Maternal grandfather had prostate cancer late in life, and his sister  had ovarian cancer later in life.  On paternal side there is a history of diabetes mellitus.  A sister has had issues with anemia and easy bruising.  The patient was seen in Genetics Clinic and underwent BRCA-1 and 2 testing prior to surgery which returned negative.      Family History   Problem Relation Age of Onset   • Cancer Other    • Stroke Other    • Diabetes Other    • Hypertension Other    • Other Other         cardiac disorder   • Ovarian cancer Other    • Lymphoma Other         NON-HODGKIN   • Diabetes insipidus Other    • Anemia Other    • Mental illness Other         Grandfather   • Arthritis Other    • Heart disease Other    • Breast cancer Maternal Grandmother         In her 70s.   • Prostate cancer Maternal Grandfather    • Other Sister         Anemia and easy bruising   • Hypertension Sister    • Hypertension Mother    • Mental illness Mother    • Hypertension Father    • Diabetes Father    • Malig Hyperthermia Neg Hx        Review of Systems   Constitutional: Positive for fatigue. Negative for activity change, appetite change, fever and unexpected weight change.   HENT: Negative for congestion, dental problem, mouth sores, nosebleeds and voice change.    Respiratory: Negative for cough and shortness of breath.    Cardiovascular: Negative for chest pain, palpitations and leg swelling.   Gastrointestinal: Negative for abdominal distention, abdominal pain, blood in stool, constipation, diarrhea, nausea and vomiting.   Endocrine: Negative for cold intolerance and heat intolerance.   Genitourinary: Negative for difficulty urinating, dysuria, frequency, hematuria and menstrual problem.   Musculoskeletal: Positive for arthralgias. Negative for back pain, joint swelling and myalgias.   Skin: Positive for rash.   Neurological: Negative for dizziness, syncope, weakness, light-headedness, numbness and headaches.   Hematological: Negative for adenopathy. Does not bruise/bleed easily.   Psychiatric/Behavioral:  Negative for confusion, dysphoric mood and sleep disturbance. The patient is not nervous/anxious.          Objective      Vitals:    10/25/19 1621   BP: 127/58   Pulse: 101   Resp: 18   Temp: 98.6 °F (37 °C)   SpO2: 97%      Current Status 10/25/2019   ECOG score 0   Pain 0/10    Physical Exam   Constitutional: She is oriented to person, place, and time. She appears well-developed and well-nourished.   HENT:   Mouth/Throat: Oropharynx is clear and moist.   Eyes: Conjunctivae are normal.   Neck: No thyromegaly present.   Cardiovascular: Normal rate and regular rhythm. Exam reveals no gallop and no friction rub.   No murmur heard.  Pulmonary/Chest: Effort normal and breath sounds normal. No respiratory distress. She has no wheezes.   Right breast without masses or abnormalities palpated.  Status post left mastectomy with radiation fibrosis noted in the chest wall and associated radiation telangiectasias, unchanged from prior exam, no suspicious findings.   Abdominal: Soft. Bowel sounds are normal. She exhibits no distension. There is no tenderness.   Musculoskeletal: She exhibits no edema.   Lymphadenopathy:        Head (right side): No submandibular adenopathy present.     She has no cervical adenopathy.     She has no axillary adenopathy.        Right: No inguinal and no supraclavicular adenopathy present.        Left: No inguinal and no supraclavicular adenopathy present.   Neurological: She is alert and oriented to person, place, and time. She displays normal reflexes. No cranial nerve deficit. She exhibits normal muscle tone.   Skin: Skin is warm and dry. No rash noted.   There are some raised erythematous whelp-like areas noted in the mid epigastric region where the patient has been scratching today   Psychiatric: She has a normal mood and affect. Her behavior is normal.       RECENT LABS:  Hematology WBC   Date Value Ref Range Status   10/25/2019 10.87 (H) 3.40 - 10.80 10*3/mm3 Final     RBC   Date Value Ref  Range Status   10/25/2019 4.51 3.77 - 5.28 10*6/mm3 Final     Hemoglobin   Date Value Ref Range Status   10/25/2019 12.5 12.0 - 15.9 g/dL Final     Hematocrit   Date Value Ref Range Status   10/25/2019 40.3 34.0 - 46.6 % Final     Platelets   Date Value Ref Range Status   10/25/2019 398 140 - 450 10*3/mm3 Final        Lab Results   Component Value Date    GLUCOSE 212 (H) 10/25/2019    BUN 15 10/25/2019    CREATININE 0.60 10/25/2019    EGFRIFNONA 108 10/25/2019    BCR 25.0 10/25/2019    CO2 25.8 10/25/2019    CALCIUM 10.0 10/25/2019    ALBUMIN 4.40 10/25/2019    AST 20 10/25/2019    ALT 21 10/25/2019     DEXA scan 10/18/2019:  Average lumbar bone mineral density 1.1 g/cm sq correlating to a T value  of 0.1 and a Z score of 0.6. Right femoral neck bone mineral density is  0.62 g/cm sq correlating to a T value -2.1, a Z value -1.6. Left femoral  bone mineral density 0.82 g/cm sq correlating to a T value -0.3 and a Z  score of 0.2.     CONCLUSION: Right hip osteopenia.     Compared to the previous examination 10/12/2018 there has been a 5%  improvement in the lumbar bone mineral density, 23.3% decrease in the  right femoral neck bone mineral density and an 18.2% improvement in the  left femoral neck BMD.    Right mammogram 10/24/2019: Report requested.      Assessment/Plan     1. Stage IIA ([id number]) left breast cancer:   The patient had 2 primary lesions of the left breast, the first ER positive, RI borderline, HER-2/jodi negative. The second was ER positive, RI negative, HER-2/jodi positive. She had 3 of 15 lymph nodes positive. She received adjuvant dose-dense chemotherapy with AC x4 cycles followed by weekly Taxol x12 doses completed on 08/27/2013. She did receive Herceptin in conjunction with the Taxol and then completed a total of a year with last dose of Herceptin received on 05/21/2014 (3-week dose). She did initiate adjuvant tamoxifen on 09/25/2013 and we anticipate likely a 10-year course of treatment. BRCA-1  and 2 testing negative at time of diagnosis.  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    Due to continued menorrhagia she underwent KATE/BSO on 6/5/18 with benign findings.  DEXA scan 10/12/18 with evidence of osteopenia with maximal T score -1.4 the lumbar spine.  Since the patient was rendered surgically postmenopausal with only mild osteopenia, she was transitioned from tamoxifen to adjuvant Arimidex on 11/21/18 with intent to complete 10 years of total adjuvant endocrine therapy (from time of initiation of tamoxifen 9/25/13).    Patient returns today in follow-up continuing on adjuvant Arimidex.  She is tolerating this well.  She has no clinical evidence of recurrent disease.  Her exam today was negative.  She does have worsening osteopenia on current DEXA scan from 10/18/2019 that will require treatment as outlined below.  We will anticipate a one year follow-up DEXA scan in October 2020.  If bone density does not improve on Prolia, we will need to consider whether patient can continue on aromatase inhibitor therapy.    2. Depression/anxiety with prior psychotic episode: The patient required hospitalization in the behavioral health unit in October 2015. She has had difficulties with anxiety and depression in the past, however, given her breast cancer diagnosis as well as stressors from her job and family, it appears that these precipitated the event.  She has stabilized and continues on Lexapro at 20 mg per day.   3. Rheumatoid arthritis: The patient was experiencing diffuse arthralgias as well as pain in her wrists right greater than left.  We did perform an evaluation 4/28/17 with negative MARYSE panel however rheumatoid factor was positive at 16.4.  She was referred to rheumatology Dr. Lopez in May 2017. The patient was felt to have early autoimmune disorder/rheumatoid arthritis.  She was started on Plaquenil.  Symptoms have stabilized and improved over time.  She does continue routine  follow-up with rheumatology.    4. Vitamin D deficiency: The patient has been on and off of vitamin D supplementation in the past.  Recently, she had a borderline low 25 hydroxy vitamin D level on 5/14/2019 at 28.3 and was taking vitamin D 400 units over-the-counter daily, subsequently increased dose to 800 units daily.  On 8/6/2019, 25 hydroxy vitamin D level had improved to 34.8.  5. Anemia: The patient developed significant anemia with a hemoglobin down to 10.8 on 3/30/16.  Evaluation revealed iron deficiency with a ferritin of 12.5 iron percent saturation of 5 and a TIBC 535.  She started oral iron sulfate 325 mg twice a day and was tolerating this very well.  Her hemoglobin improved into the 12 range.  Her iron studies showed a significant improvement previously in October 2016 with ferritin up to 54, iron saturation 16%.  She was asked to continue on oral iron twice a day however she did not do so.  The patient also was found to have B12 deficiency with a level of 187 on 3/30/16.  She started B12 replacement and received weekly dosing ×4 followed bimonthly injections, last received 7/22/16.  As it was difficult for her to come in to the office for B12 injections, she was asked to begin oral B12 1000 µg daily however she did not do this.  On 4/28/17, she was mildly anemic with a hemoglobin of 11.4.  She had a thrombocytosis as well, related to iron deficiency.  Repeat iron studies 4/28/17 showed a ferritin that declined to 42, iron saturation low at 7%, elevated TIBC at 491. She was asked to resume oral iron twice daily.  B12 level 4/28/17 had remained in the low normal range at 370 and was monitored.  On 7/14/17, B12 level was again low normal at 299 if she was asked to resume oral B12 however she did not do so.  She was intolerant of oral iron with diarrhea and abdominal cramping.  Labs on 11/8/17 showed a declining iron saturation 9% and a slight decline in ferritin down to 39 with a TIBC at the 501.  Her  hemoglobin was up to 12.8.  Oral iron was discontinued 11/8/17 and she proceeded on with Feraheme on 11/15/17 and 11/22/17.  She also resumed oral B12 daily.  The patient had recurrent menorrhagia but underwent KATE/BSO on 6/5/18.  B12 level on 5/14/2019 was 1053.  Today, patient has a hemoglobin of 13.1.  Repeat labs 8/6/2019 with decline in ferritin to 74.1, low iron saturation at 8%, TIBC of 521.  She is not able to tolerate oral iron however does not yet need additional IV iron.  Plan to recheck iron studies at return visit in 3 months.  With upcoming gastric bypass this may become more of an issue in the future.  6. Elevated transaminases with evidence of hepatic steatosis: The patient has had intermittent mild elevation in transaminases.  Viral hepatitis panel -5/14/2019.  Hepatic ultrasound 5/28/2019 with borderline hepatomegaly with probable mild fatty infiltration with increased echotexture.  Transaminases today are normal.  7. Left upper extremity lymphedema: The patient does wear her sleeve intermittently, is not wearing it today.  Lymphedema is stable to improved.  8. Osteopenia: Previous DEXA scan 3/5/14 osteopenia with maximal T score of -1.1 in the lumbar spine.  Surgically postmenopausal.  Repeat DEXA scan in 10/12/18 with maximal T score -1.4 lumbar spine showing worsening since 2014.  Aromatase inhibitor therapy initiate all d with Arimidex 1 mg daily on 11/20/18.  The patient returns today with one year follow-up DEXA scan to review from 10/18/2019.  This shows a 5% improvement in lumbar spine with T score 0.1, 23% decrease right femoral neck with T score -2.1, 18% improvement in left femoral neck with T score -0.3.  With decline in bone density in the right femoral neck approaching osteoporosis, recommended that we pursue treatment at this point as she continues on aromatase inhibitor therapy.  She is not a good candidate for oral Fosamax given her recent difficulties with gastritis and ulcer as  well as upcoming gastric bypass.  Therefore I have recommended the use of Prolia every 6 months.  We did discuss risk of osteonecrosis of the jaw.  She did undergo a wisdom tooth removal in early May 2019 however that area has healed and she has not experienced any other dental issues in the interim.  She will therefore return next week to receive her first injection of Prolia.  9. Hypercalcemia: The patient had hypercalcemia on 5/14/2019 with calcium 10.8, albumin 4.6.  Intact PTH was suppressed at 12.8.  Suspected to be related to exogenous calcium supplementation.  Patient currently continues on calcium 1200 mg daily with calcium of 10.0 today.  10. Obesity: The patient is proceeding with gastric bypass at [facility] in [place] scheduled for 11/13/2019.  11. Reported gastritis/gastric ulcer: Patient reports that EGD performed prior to upcoming gastric bypass showed evidence of ulcer and gastritis.  She was treated with Carafate and omeprazole with follow-up EGD on 10/14/2019 that showed improvement but did also show a hiatal hernia.  Hiatal hernia may require correction at time of gastric bypass.  12. Skin rash: Patient notes some skin sensitivity/dermatographism and pruritus in her anterior abdominal wall.  On exam today she has a few whelps where she has been scratching.  There is no palpable abnormality in the subcutaneous tissue.  Patient denies any new detergents, soaps, or clothing.  Instructed to use over-the-counter hydrocortisone cream for possible hypersensitivity.  13. Leukocytosis: Patient has a slightly elevated white count today of 10.87 with normal differential.  She has no infectious symptoms.  She has not been treated with any steroids recently.  No obvious inflammatory issues.    PLAN:    1. Continue adjuvant Arimidex 1 mg daily  2. Continue calcium 1200 mg and vitamin D 800 units daily supplementation  3. Continue oral B12 supplement daily  4. Obtain reports from recent right mammogram  10/24/2019 and recent EGD  5. The patient will return in 1 week with CBC, CMP, magnesium, phosphorus and begin treatment with Prolia for worsening osteoporosis in the setting of aromatase inhibitor use.  6. Patient is proceeding with gastric bypass surgery at [facility] on 11/13/2019  7. MD visit in 3 months with CBC, CMP, iron panel, ferritin

## 2019-10-31 ENCOUNTER — INFUSION (OUTPATIENT)
Dept: ONCOLOGY | Facility: HOSPITAL | Age: 44
End: 2019-10-31

## 2019-10-31 ENCOUNTER — LAB (OUTPATIENT)
Dept: LAB | Facility: HOSPITAL | Age: 44
End: 2019-10-31

## 2019-10-31 VITALS
HEART RATE: 100 BPM | WEIGHT: 293 LBS | BODY MASS INDEX: 48.86 KG/M2 | SYSTOLIC BLOOD PRESSURE: 130 MMHG | DIASTOLIC BLOOD PRESSURE: 85 MMHG | TEMPERATURE: 98.6 F | OXYGEN SATURATION: 98 %

## 2019-10-31 DIAGNOSIS — M85.89 OSTEOPENIA OF MULTIPLE SITES: Primary | ICD-10-CM

## 2019-10-31 DIAGNOSIS — M85.89 OSTEOPENIA OF MULTIPLE SITES: ICD-10-CM

## 2019-10-31 LAB
ALBUMIN SERPL-MCNC: 4.6 G/DL (ref 3.5–5.2)
ALBUMIN/GLOB SERPL: 1.3 G/DL (ref 1.1–2.4)
ALP SERPL-CCNC: 121 U/L (ref 38–116)
ALT SERPL W P-5'-P-CCNC: 17 U/L (ref 0–33)
ANION GAP SERPL CALCULATED.3IONS-SCNC: 14.9 MMOL/L (ref 5–15)
AST SERPL-CCNC: 14 U/L (ref 0–32)
BASOPHILS # BLD AUTO: 0.05 10*3/MM3 (ref 0–0.2)
BASOPHILS NFR BLD AUTO: 0.4 % (ref 0–1.5)
BILIRUB SERPL-MCNC: 0.3 MG/DL (ref 0.2–1.2)
BUN BLD-MCNC: 20 MG/DL (ref 6–20)
BUN/CREAT SERPL: 31.7 (ref 7.3–30)
CALCIUM SPEC-SCNC: 10.6 MG/DL (ref 8.5–10.2)
CHLORIDE SERPL-SCNC: 94 MMOL/L (ref 98–107)
CO2 SERPL-SCNC: 29.1 MMOL/L (ref 22–29)
CREAT BLD-MCNC: 0.63 MG/DL (ref 0.6–1.1)
DEPRECATED RDW RBC AUTO: 43 FL (ref 37–54)
EOSINOPHIL # BLD AUTO: 0.19 10*3/MM3 (ref 0–0.4)
EOSINOPHIL NFR BLD AUTO: 1.7 % (ref 0.3–6.2)
ERYTHROCYTE [DISTWIDTH] IN BLOOD BY AUTOMATED COUNT: 13.2 % (ref 12.3–15.4)
GFR SERPL CREATININE-BSD FRML MDRD: 102 ML/MIN/1.73
GLOBULIN UR ELPH-MCNC: 3.6 GM/DL (ref 1.8–3.5)
GLUCOSE BLD-MCNC: 136 MG/DL (ref 74–124)
HCT VFR BLD AUTO: 41.5 % (ref 34–46.6)
HGB BLD-MCNC: 13 G/DL (ref 12–15.9)
IMM GRANULOCYTES # BLD AUTO: 0.07 10*3/MM3 (ref 0–0.05)
IMM GRANULOCYTES NFR BLD AUTO: 0.6 % (ref 0–0.5)
LYMPHOCYTES # BLD AUTO: 2.59 10*3/MM3 (ref 0.7–3.1)
LYMPHOCYTES NFR BLD AUTO: 23 % (ref 19.6–45.3)
MAGNESIUM SERPL-MCNC: 1.7 MG/DL (ref 1.8–2.5)
MCH RBC QN AUTO: 28 PG (ref 26.6–33)
MCHC RBC AUTO-ENTMCNC: 31.3 G/DL (ref 31.5–35.7)
MCV RBC AUTO: 89.2 FL (ref 79–97)
MONOCYTES # BLD AUTO: 0.54 10*3/MM3 (ref 0.1–0.9)
MONOCYTES NFR BLD AUTO: 4.8 % (ref 5–12)
NEUTROPHILS # BLD AUTO: 7.8 10*3/MM3 (ref 1.7–7)
NEUTROPHILS NFR BLD AUTO: 69.5 % (ref 42.7–76)
NRBC BLD AUTO-RTO: 0 /100 WBC (ref 0–0.2)
PHOSPHATE SERPL-MCNC: 4.9 MG/DL (ref 2.5–4.5)
PLATELET # BLD AUTO: 404 10*3/MM3 (ref 140–450)
PMV BLD AUTO: 10 FL (ref 6–12)
POTASSIUM BLD-SCNC: 3.9 MMOL/L (ref 3.5–4.7)
PROT SERPL-MCNC: 8.2 G/DL (ref 6.3–8)
RBC # BLD AUTO: 4.65 10*6/MM3 (ref 3.77–5.28)
SODIUM BLD-SCNC: 138 MMOL/L (ref 134–145)
WBC NRBC COR # BLD: 11.24 10*3/MM3 (ref 3.4–10.8)

## 2019-10-31 PROCEDURE — 36415 COLL VENOUS BLD VENIPUNCTURE: CPT

## 2019-10-31 PROCEDURE — 84100 ASSAY OF PHOSPHORUS: CPT

## 2019-10-31 PROCEDURE — 25010000002 DENOSUMAB 60 MG/ML SOLUTION PREFILLED SYRINGE: Performed by: INTERNAL MEDICINE

## 2019-10-31 PROCEDURE — 96372 THER/PROPH/DIAG INJ SC/IM: CPT | Performed by: INTERNAL MEDICINE

## 2019-10-31 PROCEDURE — 80053 COMPREHEN METABOLIC PANEL: CPT

## 2019-10-31 PROCEDURE — 85025 COMPLETE CBC W/AUTO DIFF WBC: CPT

## 2019-10-31 PROCEDURE — 83735 ASSAY OF MAGNESIUM: CPT

## 2019-10-31 RX ADMIN — DENOSUMAB 60 MG: 60 INJECTION SUBCUTANEOUS at 16:12

## 2019-12-11 DIAGNOSIS — Z17.0 MALIGNANT NEOPLASM OF OVERLAPPING SITES OF LEFT BREAST IN FEMALE, ESTROGEN RECEPTOR POSITIVE (HCC): Primary | ICD-10-CM

## 2019-12-11 DIAGNOSIS — C50.812 MALIGNANT NEOPLASM OF OVERLAPPING SITES OF LEFT BREAST IN FEMALE, ESTROGEN RECEPTOR POSITIVE (HCC): Primary | ICD-10-CM

## 2020-01-17 ENCOUNTER — OFFICE VISIT (OUTPATIENT)
Dept: ONCOLOGY | Facility: CLINIC | Age: 45
End: 2020-01-17

## 2020-01-17 ENCOUNTER — LAB (OUTPATIENT)
Dept: LAB | Facility: HOSPITAL | Age: 45
End: 2020-01-17

## 2020-01-17 VITALS
WEIGHT: 233.1 LBS | TEMPERATURE: 97.9 F | BODY MASS INDEX: 38.84 KG/M2 | RESPIRATION RATE: 16 BRPM | SYSTOLIC BLOOD PRESSURE: 139 MMHG | HEART RATE: 73 BPM | OXYGEN SATURATION: 96 % | DIASTOLIC BLOOD PRESSURE: 77 MMHG | HEIGHT: 65 IN

## 2020-01-17 DIAGNOSIS — E55.9 VITAMIN D DEFICIENCY: ICD-10-CM

## 2020-01-17 DIAGNOSIS — D50.8 OTHER IRON DEFICIENCY ANEMIA: ICD-10-CM

## 2020-01-17 DIAGNOSIS — Z17.0 MALIGNANT NEOPLASM OF OVERLAPPING SITES OF LEFT BREAST IN FEMALE, ESTROGEN RECEPTOR POSITIVE (HCC): ICD-10-CM

## 2020-01-17 DIAGNOSIS — D51.8 OTHER VITAMIN B12 DEFICIENCY ANEMIA: ICD-10-CM

## 2020-01-17 DIAGNOSIS — M85.89 OSTEOPENIA OF MULTIPLE SITES: ICD-10-CM

## 2020-01-17 DIAGNOSIS — Z17.0 MALIGNANT NEOPLASM OF OVERLAPPING SITES OF LEFT BREAST IN FEMALE, ESTROGEN RECEPTOR POSITIVE (HCC): Primary | ICD-10-CM

## 2020-01-17 DIAGNOSIS — C50.812 MALIGNANT NEOPLASM OF OVERLAPPING SITES OF LEFT BREAST IN FEMALE, ESTROGEN RECEPTOR POSITIVE (HCC): ICD-10-CM

## 2020-01-17 DIAGNOSIS — C50.812 MALIGNANT NEOPLASM OF OVERLAPPING SITES OF LEFT BREAST IN FEMALE, ESTROGEN RECEPTOR POSITIVE (HCC): Primary | ICD-10-CM

## 2020-01-17 LAB
ALBUMIN SERPL-MCNC: 4.1 G/DL (ref 3.5–5.2)
ALBUMIN/GLOB SERPL: 1.2 G/DL (ref 1.1–2.4)
ALP SERPL-CCNC: 80 U/L (ref 38–116)
ALT SERPL W P-5'-P-CCNC: 18 U/L (ref 0–33)
ANION GAP SERPL CALCULATED.3IONS-SCNC: 15.5 MMOL/L (ref 5–15)
AST SERPL-CCNC: 20 U/L (ref 0–32)
BASOPHILS # BLD AUTO: 0.06 10*3/MM3 (ref 0–0.2)
BASOPHILS NFR BLD AUTO: 0.8 % (ref 0–1.5)
BILIRUB SERPL-MCNC: 0.4 MG/DL (ref 0.2–1.2)
BUN BLD-MCNC: 14 MG/DL (ref 6–20)
BUN/CREAT SERPL: 26.4 (ref 7.3–30)
CALCIUM SPEC-SCNC: 9.1 MG/DL (ref 8.5–10.2)
CHLORIDE SERPL-SCNC: 104 MMOL/L (ref 98–107)
CO2 SERPL-SCNC: 22.5 MMOL/L (ref 22–29)
CREAT BLD-MCNC: 0.53 MG/DL (ref 0.6–1.1)
DEPRECATED RDW RBC AUTO: 51.8 FL (ref 37–54)
EOSINOPHIL # BLD AUTO: 0.14 10*3/MM3 (ref 0–0.4)
EOSINOPHIL NFR BLD AUTO: 1.9 % (ref 0.3–6.2)
ERYTHROCYTE [DISTWIDTH] IN BLOOD BY AUTOMATED COUNT: 15.9 % (ref 12.3–15.4)
FERRITIN SERPL-MCNC: 116.2 NG/ML (ref 11–207)
GFR SERPL CREATININE-BSD FRML MDRD: 124 ML/MIN/1.73
GLOBULIN UR ELPH-MCNC: 3.5 GM/DL (ref 1.8–3.5)
GLUCOSE BLD-MCNC: 87 MG/DL (ref 74–124)
HCT VFR BLD AUTO: 37.3 % (ref 34–46.6)
HGB BLD-MCNC: 11.7 G/DL (ref 12–15.9)
IMM GRANULOCYTES # BLD AUTO: 0.01 10*3/MM3 (ref 0–0.05)
IMM GRANULOCYTES NFR BLD AUTO: 0.1 % (ref 0–0.5)
IRON 24H UR-MRATE: 27 MCG/DL (ref 37–145)
IRON SATN MFR SERPL: 8 % (ref 14–48)
LYMPHOCYTES # BLD AUTO: 2.34 10*3/MM3 (ref 0.7–3.1)
LYMPHOCYTES NFR BLD AUTO: 31.7 % (ref 19.6–45.3)
MCH RBC QN AUTO: 27.9 PG (ref 26.6–33)
MCHC RBC AUTO-ENTMCNC: 31.4 G/DL (ref 31.5–35.7)
MCV RBC AUTO: 89 FL (ref 79–97)
MONOCYTES # BLD AUTO: 0.46 10*3/MM3 (ref 0.1–0.9)
MONOCYTES NFR BLD AUTO: 6.2 % (ref 5–12)
NEUTROPHILS # BLD AUTO: 4.38 10*3/MM3 (ref 1.7–7)
NEUTROPHILS NFR BLD AUTO: 59.3 % (ref 42.7–76)
NRBC BLD AUTO-RTO: 0 /100 WBC (ref 0–0.2)
PLATELET # BLD AUTO: 420 10*3/MM3 (ref 140–450)
PMV BLD AUTO: 9.9 FL (ref 6–12)
POTASSIUM BLD-SCNC: 3.4 MMOL/L (ref 3.5–4.7)
PROT SERPL-MCNC: 7.6 G/DL (ref 6.3–8)
RBC # BLD AUTO: 4.19 10*6/MM3 (ref 3.77–5.28)
SODIUM BLD-SCNC: 142 MMOL/L (ref 134–145)
TIBC SERPL-MCNC: 325 MCG/DL (ref 249–505)
TRANSFERRIN SERPL-MCNC: 232 MG/DL (ref 200–360)
WBC NRBC COR # BLD: 7.39 10*3/MM3 (ref 3.4–10.8)

## 2020-01-17 PROCEDURE — 99214 OFFICE O/P EST MOD 30 MIN: CPT | Performed by: INTERNAL MEDICINE

## 2020-01-17 PROCEDURE — 85025 COMPLETE CBC W/AUTO DIFF WBC: CPT

## 2020-01-17 PROCEDURE — 82728 ASSAY OF FERRITIN: CPT

## 2020-01-17 PROCEDURE — 80053 COMPREHEN METABOLIC PANEL: CPT

## 2020-01-17 PROCEDURE — 36415 COLL VENOUS BLD VENIPUNCTURE: CPT

## 2020-01-17 PROCEDURE — 84466 ASSAY OF TRANSFERRIN: CPT

## 2020-01-17 PROCEDURE — 83540 ASSAY OF IRON: CPT

## 2020-01-17 NOTE — PROGRESS NOTES
Subjective .     REASONS FOR FOLLOWUP:    1. Stage IIA ([id number]) left breast cancer:  Status post left mastectomy with left axillary lymph node dissection 03/12/2013.  Lesion #1 invasive ductal carcinoma grade 2, 1.5 cm ER positive (90%), FL positive (1-4%), HER-2/jodi FISH negative (ratio 1.4),  Ki-67 score of 8%.  Lesion #2 invasive ductal carcinoma, grade 2, 1.7 cm,  ER positive (70%), FL negative (%),HER-2/jodi FISH positive (ratio of 3.0).  3 of 15 lymph nodes positive (2 macrometastases, 1 micrometastasis, largest lesion 2.5 cm replacing lymph node).  Lymph node evaluated and is ER positive (90%), FL positive (30%), HER-2/jodi FISH negative (ratio of 1.4).    2. BRCA-1 and 2 testing negative.  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  3. Participation in the [employer] Clinical trial.   4. Status post Mediport placement by Dr. Neumann 04/08/2013.  Mediport removed by Dr. Mabry 07/21/2014.  5. Initiation of adjuvant chemotherapy 04/16/2013.  Treatment planned:  dose-dense Adriamycin and Cytoxan with Neulasta support q.2 weeks x4 cycles.  Subsequent weekly Taxol and Herceptin x12 weeks.  Subsequent continuation of Herceptin q.3 weeks to complete one year.  Adjuvant endocrine therapy after completion of chemotherapy with tamoxifen (premenopausal).   Adjuvant radiation therapy following completion of chemotherapy.  Last dose of Taxol administered 08/27/2013. Tamoxifen initiated 09/25/2013 (plan to treat for 10 years).   Final dose of Herceptin administered 05/21/2014 (one year of treatment completed 06/11/2014).   6. 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  7. Borderline elevated transaminases. CT abdomen 10/30/2013 with normal appearing liver. Suspected steatohepatitis.   8. Left foot plantar fasciitis with stress fracture of the 4th metatarsal, status post evaluation with bone scan, 09/29/2014 and MRI of the left foot, 10/08/2014. Negative for  metastatic disease.    9. Lymphedema of the left upper extremity with compression sleeve in place and having been evaluated in the lymphedema clinic.   10. Left wrist tenosynovitis identified on MRI of 06/18/2015. Status post steroid injection by orthopedics, on 07/18/2015.   11. Anemia with hemoglobin decreased to 10.8 on 3/30/16.  Evaluation revealing evidence of iron deficiency (related to menstrual blood loss) and B12 deficiency.  Initiated oral iron sulfate 325 mg twice a day as well as B12 replacement with weekly injections ×4 and subsequent monthly injections with intended transition to oral B12 1000 µg daily, however, patient did not follow through.  Patient also discontinued oral iron.  Recurrent iron deficiency with oral iron resumed 5/4/17 twice a day dosing, again discontinued due to poor tolerance.  Administration of Feraheme 11/15 and 11/22/17.  12. Vitamin D deficiency continuing on vitamin D 800 units daily.  25 hydroxy vitamin D level normal on 8/6/2019 at 34.8.  13. Positive rheumatoid factor 4/28/17 (16.4), referred to rheumatology for further evaluation.  Initiated treatment with Plaquenil per rheumatology.  14. Status post KATE/BSO 6/5/18 (benign pathologic findings).  Transition from tamoxifen to adjuvant Arimidex 11/20/18 due to surgical postmenopausal state.  15. Osteopenia: DEXA scan 10/12/18 with maximal T score -1.4 in the lumbar spine, monitor annually on aromatase inhibitor therapy.  Calcium and vitamin D supplementation.  16. Gastric bypass performed 11/13/2019.    HISTORY OF PRESENT ILLNESS:  The patient is a 44 y.o. year old female who is here for follow-up with the above-mentioned history.    History of Present Illness the patient returns today continuing on adjuvant Arimidex 1 mg daily.  The patient has also been continuing on calcium 1200 mg and vitamin D 800 units daily.  In the interval since her last visit, she did undergo her gastric bypass on 11/13/2019.  Today, she has  decreased from 296 pounds down to 233 pounds.  She notes that this was in part related to an episode of presumed influenza in late December.  She feels much better after the bypass and weight loss.  She is eating small portions more frequently.  She has no other complaints today.    PAST MEDICAL HISTORY:    1. Hypertension.  2. History of menorrhagia status post [facility] with Dr. Elda Roland 16.  Pathology showed polypoid endometrium with disordered proliferation but no evidence of malignancy.  Menstrual blood loss ceased.  3. History of phentermine use for weight loss.  4. Status post knee surgery after traumatic fracture with hardware in place in .  5. Status post  x2.  6. Status post Mediport placement by Dr. Neumann on 2013. Mediport removed by Dr. Mabry 2014.  7. Left foot plantar fasciitis with stress fracture of the left 4th metatarsal, 2014.   8. Osteopenia.   9. Vitamin D deficiency.   10. Anxiety/depression.  Hospitalization required in 10/2015 due to major depressive disorder with psychotic features.   11. Anemia secondary to iron deficiency (menstrual blood loss) and B12 deficiency.  12. Suspected early rheumatoid arthritis initiated Plaquenil under the direction of Dr. Lopez in rheumatology in May 2017.  Positive rheumatoid factor 16.4 on 17.  13. Status post right carpal tunnel release surgery in 2017  14. Diabetes mellitus  15. Status post KATE/BSO 18    OB-GYN HISTORY:   G3, P3, A0, menarche age 10.  The patient has had regular menstrual cycles, however, has some degree of menorrhagia.  She was on oral contraceptives for 3-4 years in her late teens to early 20s. Menstrual cycles ceased with the 1st cycle of chemotherapy.     Menstrual cycles resumed in 2015. Endometrial biopsy performed due to endometrial thickening in 2015 and again 16 with benign pathology.  Recurrent severe dysfunctional uterine bleeding in early 2016  causing anemia.  D&C 16-  pathology showed polypoid endometrium with disordered proliferation but no evidence of malignancy.   Past Medical History:   Diagnosis Date   • Anxiety    • Arthritis    • Depression    • H/O Borderline vitamin D deficiency    • H/O Grade 1 peripheral neuropathy     Involving feet.   • H/O Iron deficiency anemia     Presumed secondary to menstrual loss.   • H/O Lymphedema of left upper extremity    • Hx of being hospitalized 10/2015    Due to major depressive disorder with psychotic features.   • Hypertension    • Malignant neoplasm of breast (CMS/HCC) 2013    LEFT SIDE WITH MASTECTOMY (BRCA-1 and 2 negative)   • Menorrhagia    • Osteopenia    • Plantar fasciitis     LEFT FOOT   • Rash     BILATERAL LOWER LEGS CAUSED BY CHEMO   • Sleep apnea     USES CPAP   • Tenosynovitis of left wrist    • Vitamin D deficiency     BORDERLINE     Past Surgical History:   Procedure Laterality Date   •  SECTION  , 2002    x2   • D&C HYSTEROSCOPY N/A 2016    Procedure: DILATATION AND CURETTAGE HYSTEROSCOPY;  Surgeon: Elda Roland MD;  Location: [facility];  Service:    • KNEE SURGERY Right     After traumatic fracture with hardware in place.   • MEDIPORT INSERTION, DOUBLE Right    • MEDIPORT REMOVAL Right 2014    By Dr. Mabry.   • MODIFIED RADICAL MASTECTOMY W/ AXILLARY LYMPH NODE DISSECTION Left 2013   • TOTAL LAPAROSCOPIC HYSTERECTOMY WITH TRANSVAGINAL TAPING Bilateral 2018    Procedure: TOTAL LAPAROSCOPIC HYSTERECTOMY SHA SALPNGO OOPHORECTOMY TENSION FREE VAGINAL TAPING CYSTOSCOPY;  Surgeon: Jocelynn Zimmerman MD;  Location: [facility];  Service: Gynecology     ONCOLOGIC HISTORY:  (History from previous dates can be found in the separate document.)    Current Outpatient Medications on File Prior to Visit   Medication Sig Dispense Refill   • anastrozole (ARIMIDEX) 1 MG tablet TAKE ONE TABLET BY MOUTH DAILY 30 tablet 4   • escitalopram (LEXAPRO) 20 MG  tablet Take 20 mg by mouth daily.     • LORazepam (ATIVAN) 1 MG tablet Take 1 mg by mouth 3 (three) times a day.     • hydroxychloroquine (PLAQUENIL) 200 MG tablet Take 400 mg by mouth Daily.     • losartan-hydrochlorothiazide (HYZAAR) 100-25 MG per tablet Take 1 tablet by mouth Every Night.     • metoprolol succinate XL (TOPROL-XL) 50 MG 24 hr tablet Take 50 mg by mouth Every Night.     • TRULICITY 0.75 MG/0.5ML solution pen-injector      • vitamin D (ERGOCALCIFEROL) 48770 UNITS capsule capsule Take 1 capsule by mouth 1 (One) Time Per Week. For 8 weeks 8 capsule 0     Current Facility-Administered Medications on File Prior to Visit   Medication Dose Route Frequency Provider Last Rate Last Dose   • cyanocobalamin injection 1,000 mcg  1,000 mcg Intramuscular Once Glenn Lyon Jr., MD           ALLERGIES:     Allergies   Allergen Reactions   • Shellfish-Derived Products GI Intolerance   • Shrimp (Diagnostic) Nausea And Vomiting   • Adhesive Tape Rash       SOCIAL HISTORY:  The patient is  and lives with her  in [place], [place].  She works as a teacher.  She denies history of smoking, denies significant alcohol use.    Social History     Socioeconomic History   • Marital status:      Spouse name: Jonathan   • Number of children: 3   • Years of education: College   • Highest education level: Not on file   Occupational History   • Occupation: Teacher     Employer: [employer]     Comment: Worked in marketing in past.   Tobacco Use   • Smoking status: Never Smoker   • Smokeless tobacco: Never Used   Substance and Sexual Activity   • Alcohol use: Yes     Comment: Light (less than 1 per week)   • Drug use: No   • Sexual activity: Defer   Social History Narrative    Enjoys family activities, sports, 4-[employer] leader/[employer]. College education.       FAMILY HISTORY:  Maternal grandmother had breast cancer in her 70s; maternal grandmother’s sister had ovarian cancer in her 70s, and another sister had  non-Hodgkin's lymphoma in her 70s.  Maternal grandfather had prostate cancer late in life, and his sister had ovarian cancer later in life.  On paternal side there is a history of diabetes mellitus.  A sister has had issues with anemia and easy bruising.  The patient was seen in Genetics Clinic and underwent BRCA-1 and 2 testing prior to surgery which returned negative.      Family History   Problem Relation Age of Onset   • Cancer Other    • Stroke Other    • Diabetes Other    • Hypertension Other    • Other Other         cardiac disorder   • Ovarian cancer Other    • Lymphoma Other         NON-HODGKIN   • Diabetes insipidus Other    • Anemia Other    • Mental illness Other         Grandfather   • Arthritis Other    • Heart disease Other    • Breast cancer Maternal Grandmother         In her 70s.   • Prostate cancer Maternal Grandfather    • Other Sister         Anemia and easy bruising   • Hypertension Sister    • Hypertension Mother    • Mental illness Mother    • Hypertension Father    • Diabetes Father    • Malig Hyperthermia Neg Hx        Review of Systems   Constitutional: Positive for fatigue. Negative for activity change, appetite change, fever and unexpected weight change.   HENT: Negative for congestion, dental problem, mouth sores, nosebleeds and voice change.    Respiratory: Negative for cough and shortness of breath.    Cardiovascular: Negative for chest pain, palpitations and leg swelling.   Gastrointestinal: Negative for abdominal distention, abdominal pain, blood in stool, constipation, diarrhea, nausea and vomiting.   Endocrine: Negative for cold intolerance and heat intolerance.   Genitourinary: Negative for difficulty urinating, dysuria, frequency, hematuria and menstrual problem.   Musculoskeletal: Positive for arthralgias. Negative for back pain, joint swelling and myalgias.   Skin: Negative for rash.   Neurological: Negative for dizziness, syncope, weakness, light-headedness, numbness and  headaches.   Hematological: Negative for adenopathy. Does not bruise/bleed easily.   Psychiatric/Behavioral: Negative for confusion, dysphoric mood and sleep disturbance. The patient is not nervous/anxious.          Objective      Vitals:    01/17/20 1603   BP: 139/77   Pulse: 73   Resp: 16   Temp: 97.9 °F (36.6 °C)   SpO2: 96%      Current Status 1/17/2020   ECOG score 0   Pain 0/10    Physical Exam   Constitutional: She is oriented to person, place, and time. She appears well-developed and well-nourished.   HENT:   Mouth/Throat: Oropharynx is clear and moist.   Eyes: Conjunctivae are normal.   Neck: No thyromegaly present.   Cardiovascular: Normal rate and regular rhythm. Exam reveals no gallop and no friction rub.   No murmur heard.  Pulmonary/Chest: Effort normal and breath sounds normal. No respiratory distress. She has no wheezes.   Breast exam not performed today.   Abdominal: Soft. Bowel sounds are normal. She exhibits no distension. There is no tenderness.   Musculoskeletal: She exhibits no edema.   Lymphadenopathy:        Head (right side): No submandibular adenopathy present.     She has no cervical adenopathy.     She has no axillary adenopathy.        Right: No inguinal and no supraclavicular adenopathy present.        Left: No inguinal and no supraclavicular adenopathy present.   Neurological: She is alert and oriented to person, place, and time. She displays normal reflexes. No cranial nerve deficit. She exhibits normal muscle tone.   Skin: Skin is warm and dry. No rash noted.   Psychiatric: She has a normal mood and affect. Her behavior is normal.       RECENT LABS:  Hematology WBC   Date Value Ref Range Status   01/17/2020 7.39 3.40 - 10.80 10*3/mm3 Final     RBC   Date Value Ref Range Status   01/17/2020 4.19 3.77 - 5.28 10*6/mm3 Final     Hemoglobin   Date Value Ref Range Status   01/17/2020 11.7 (L) 12.0 - 15.9 g/dL Final     Hematocrit   Date Value Ref Range Status   01/17/2020 37.3 34.0 - 46.6  % Final     Platelets   Date Value Ref Range Status   01/17/2020 420 140 - 450 10*3/mm3 Final        Lab Results   Component Value Date    GLUCOSE 87 01/17/2020    BUN 14 01/17/2020    CREATININE 0.53 (L) 01/17/2020    EGFRIFNONA 124 01/17/2020    BCR 26.4 01/17/2020    CO2 22.5 01/17/2020    CALCIUM 9.1 01/17/2020    ALBUMIN 4.10 01/17/2020    AST 20 01/17/2020    ALT 18 01/17/2020         Assessment/Plan     1. Stage IIA ([id number]) left breast cancer:   The patient had 2 primary lesions of the left breast, the first ER positive, MO borderline, HER-2/jodi negative. The second was ER positive, MO negative, HER-2/jodi positive. She had 3 of 15 lymph nodes positive. She received adjuvant dose-dense chemotherapy with AC x4 cycles followed by weekly Taxol x12 doses completed on 08/27/2013. She did receive Herceptin in conjunction with the Taxol and then completed a total of a year with last dose of Herceptin received on 05/21/2014 (3-week dose). She did initiate adjuvant tamoxifen on 09/25/2013 and we anticipate likely a 10-year course of treatment. BRCA-1 and 2 testing negative at time of diagnosis.  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    Due to continued menorrhagia she underwent KATE/BSO on 6/5/18 with benign findings.  DEXA scan 10/12/18 with evidence of osteopenia with maximal T score -1.4 the lumbar spine.  Since the patient was rendered surgically postmenopausal with only mild osteopenia, she was transitioned from tamoxifen to adjuvant Arimidex on 11/21/18 with intent to complete 10 years of total adjuvant endocrine therapy (from time of initiation of tamoxifen 9/25/13).    Patient returns today in follow-up continuing on adjuvant Arimidex.  She continues to tolerate this well.  There is no clinical evidence of recurrent disease.  Breast exam was performed at the last visit and will be performed again when she returns in 3 more months.  She did undergo right-sided mammogram on  10/24/2019 which was negative, BI-RADS 1.    2. Depression/anxiety with prior psychotic episode: The patient required hospitalization in the behavioral health unit in October 2015. She has had difficulties with anxiety and depression in the past, however, given her breast cancer diagnosis as well as stressors from her job and family, it appears that these precipitated the event.  She has stabilized and continues on Lexapro at 20 mg per day.   3. Rheumatoid arthritis: The patient was experiencing diffuse arthralgias as well as pain in her wrists right greater than left.  We did perform an evaluation 4/28/17 with negative MARYSE panel however rheumatoid factor was positive at 16.4.  She was referred to rheumatology Dr. Lopez in May 2017. The patient was felt to have early autoimmune disorder/rheumatoid arthritis.  She was started on Plaquenil.  Symptoms have stabilized and improved over time.  She does continue routine follow-up with rheumatology.    4. Vitamin D deficiency: The patient has been on and off of vitamin D supplementation in the past.  Recently, she had a borderline low 25 hydroxy vitamin D level on 5/14/2019 at 28.3 and was taking vitamin D 400 units over-the-counter daily, subsequently increased dose to 800 units daily.  On 8/6/2019, 25 hydroxy vitamin D level had improved to 34.8.  5. Anemia: The patient developed significant anemia with a hemoglobin down to 10.8 on 3/30/16.  Evaluation revealed iron deficiency with a ferritin of 12.5 iron percent saturation of 5 and a TIBC 535.  She started oral iron sulfate 325 mg twice a day and was tolerating this very well.  Her hemoglobin improved into the 12 range.  Her iron studies showed a significant improvement previously in October 2016 with ferritin up to 54, iron saturation 16%.  She was asked to continue on oral iron twice a day however she did not do so.  The patient also was found to have B12 deficiency with a level of 187 on 3/30/16.  She started B12  replacement and received weekly dosing ×4 followed bimonthly injections, last received 7/22/16.  As it was difficult for her to come in to the office for B12 injections, she was asked to begin oral B12 1000 µg daily however she did not do this.  On 4/28/17, she was mildly anemic with a hemoglobin of 11.4.  She had a thrombocytosis as well, related to iron deficiency.  Repeat iron studies 4/28/17 showed a ferritin that declined to 42, iron saturation low at 7%, elevated TIBC at 491. She was asked to resume oral iron twice daily.  B12 level 4/28/17 had remained in the low normal range at 370 and was monitored.  On 7/14/17, B12 level was again low normal at 299 if she was asked to resume oral B12 however she did not do so.  She was intolerant of oral iron with diarrhea and abdominal cramping.  Labs on 11/8/17 showed a declining iron saturation 9% and a slight decline in ferritin down to 39 with a TIBC at the 501.  Her hemoglobin was up to 12.8.  Oral iron was discontinued 11/8/17 and she proceeded on with Feraheme on 11/15/17 and 11/22/17.  She also resumed oral B12 daily.  The patient had recurrent menorrhagia but underwent KATE/BSO on 6/5/18.  B12 level on 5/14/2019 was 1053.  Today, hemoglobin has declined slightly to 11.7.  We did repeat her iron studies showing ferritin of 116, iron 27, iron saturation 8%, TIBC 325.  Her anemia may be related to recent gastric bypass procedure.  We will repeat her iron studies along with a B12 level in 3 months.  6. Elevated transaminases with evidence of hepatic steatosis: The patient has had intermittent mild elevation in transaminases.  Viral hepatitis panel -5/14/2019.  Hepatic ultrasound 5/28/2019 with borderline hepatomegaly with probable mild fatty infiltration with increased echotexture.  Transaminases today are normal.  7. Left upper extremity lymphedema: The patient does wear her sleeve intermittently, is not wearing it today.  Lymphedema is stable to  improved.  8. Osteopenia: Previous DEXA scan 3/5/14 osteopenia with maximal T score of -1.1 in the lumbar spine.  Surgically postmenopausal.  Repeat DEXA scan in 10/12/18 with maximal T score -1.4 lumbar spine showing worsening since 2014.  Aromatase inhibitor therapy initiate all d with Arimidex 1 mg daily on 11/20/18.  One year follow-up DEXA scan 10/18/2019 showed a 5% improvement in lumbar spine with T score 0.1, 23% decrease right femoral neck with T score -2.1, 18% improvement in left femoral neck with T score -0.3.  With decline in bone density in the right femoral neck approaching osteoporosis, recommended that we pursue treatment at this point as she continues on aromatase inhibitor therapy.  She is not a good candidate for oral Fosamax given her difficulties with gastritis and ulcer as well as gastric bypass.  Therefore initiated Prolia on 10/31/2019.  Patient will be due for second dose of Prolia when she returns in 3 months.  9. Hypercalcemia: The patient had hypercalcemia on 5/14/2019 with calcium 10.8, albumin 4.6.  Intact PTH was suppressed at 12.8.  Suspected to be related to exogenous calcium supplementation.  Patient currently continues on calcium 1200 mg daily with calcium of 9.1 today.  10. Obesity: The patient is status post gastric bypass at [facility] in [place] on 11/13/2019.  The patient today has lost significant weight down from 296 to 233 pounds.  11. Reported gastritis/gastric ulcer: EGD performed prior to gastric bypass showed evidence of ulcer and gastritis.  She was treated with Carafate and omeprazole with follow-up EGD on 10/14/2019 that showed improvement but did also show a hiatal hernia.     PLAN:    1. Continue adjuvant Arimidex 1 mg daily  2. Continue calcium 1200 mg and vitamin D 800 units daily supplementation  3. Continue oral B12 supplement daily  4. MD visit in 3 months with CBC, CMP, magnesium, phosphorus, iron panel, ferritin, B12 level.  Patient will be due for  Prolia.

## 2020-04-06 RX ORDER — ANASTROZOLE 1 MG/1
TABLET ORAL
Qty: 30 TABLET | Refills: 4 | Status: SHIPPED | OUTPATIENT
Start: 2020-04-06 | End: 2020-09-03

## 2020-04-15 ENCOUNTER — TELEPHONE (OUTPATIENT)
Dept: ONCOLOGY | Facility: HOSPITAL | Age: 45
End: 2020-04-15

## 2020-04-15 ENCOUNTER — TELEPHONE (OUTPATIENT)
Dept: ONCOLOGY | Facility: CLINIC | Age: 45
End: 2020-04-15

## 2020-04-15 NOTE — TELEPHONE ENCOUNTER
Pt called to cancel 5/1/20 appts due to COVID - 19.    Pt wants Dr. Lyon's opinion on when to reschedule for. Pt questioned if the end of the summer would be okay to reschedule this appt for.    Call pt at [phone]

## 2020-04-15 NOTE — TELEPHONE ENCOUNTER
Message sent to scheduling.     ----- Message from Glenn Lyon Jr., MD sent at 4/15/2020  4:28 PM EDT -----  Let's move out 2 months for now  ----- Message -----  From: Janee Gardner RN  Sent: 4/15/2020   3:20 PM EDT  To: MD Dr. Camron Bhatia Jr.,  Pt has an apt to come in on 5/1 for 3 month f/u and prolia. She does not want to come in because of COVID-19. Wants to know when she should reschedule for? Would the end of the summer be ok?

## 2020-06-26 ENCOUNTER — OFFICE VISIT (OUTPATIENT)
Dept: ONCOLOGY | Facility: CLINIC | Age: 45
End: 2020-06-26

## 2020-06-26 ENCOUNTER — LAB (OUTPATIENT)
Dept: LAB | Facility: HOSPITAL | Age: 45
End: 2020-06-26

## 2020-06-26 ENCOUNTER — INFUSION (OUTPATIENT)
Dept: ONCOLOGY | Facility: HOSPITAL | Age: 45
End: 2020-06-26

## 2020-06-26 VITALS
RESPIRATION RATE: 16 BRPM | OXYGEN SATURATION: 96 % | BODY MASS INDEX: 34.05 KG/M2 | TEMPERATURE: 98.5 F | HEART RATE: 65 BPM | WEIGHT: 204.4 LBS | DIASTOLIC BLOOD PRESSURE: 79 MMHG | HEIGHT: 65 IN | SYSTOLIC BLOOD PRESSURE: 135 MMHG

## 2020-06-26 DIAGNOSIS — M85.89 OSTEOPENIA OF MULTIPLE SITES: Primary | ICD-10-CM

## 2020-06-26 DIAGNOSIS — Z17.0 MALIGNANT NEOPLASM OF OVERLAPPING SITES OF LEFT BREAST IN FEMALE, ESTROGEN RECEPTOR POSITIVE (HCC): ICD-10-CM

## 2020-06-26 DIAGNOSIS — M85.89 OSTEOPENIA OF MULTIPLE SITES: ICD-10-CM

## 2020-06-26 DIAGNOSIS — D50.8 OTHER IRON DEFICIENCY ANEMIA: ICD-10-CM

## 2020-06-26 DIAGNOSIS — C50.812 MALIGNANT NEOPLASM OF OVERLAPPING SITES OF LEFT BREAST IN FEMALE, ESTROGEN RECEPTOR POSITIVE (HCC): Primary | ICD-10-CM

## 2020-06-26 DIAGNOSIS — C50.812 MALIGNANT NEOPLASM OF OVERLAPPING SITES OF LEFT BREAST IN FEMALE, ESTROGEN RECEPTOR POSITIVE (HCC): ICD-10-CM

## 2020-06-26 DIAGNOSIS — Z17.0 MALIGNANT NEOPLASM OF OVERLAPPING SITES OF LEFT BREAST IN FEMALE, ESTROGEN RECEPTOR POSITIVE (HCC): Primary | ICD-10-CM

## 2020-06-26 DIAGNOSIS — D51.8 OTHER VITAMIN B12 DEFICIENCY ANEMIA: ICD-10-CM

## 2020-06-26 LAB
ALBUMIN SERPL-MCNC: 4.8 G/DL (ref 3.5–5.2)
ALBUMIN/GLOB SERPL: 1.5 G/DL (ref 1.1–2.4)
ALP SERPL-CCNC: 125 U/L (ref 38–116)
ALT SERPL W P-5'-P-CCNC: 28 U/L (ref 0–33)
ANION GAP SERPL CALCULATED.3IONS-SCNC: 13.3 MMOL/L (ref 5–15)
AST SERPL-CCNC: 23 U/L (ref 0–32)
BASOPHILS # BLD AUTO: 0.07 10*3/MM3 (ref 0–0.2)
BASOPHILS NFR BLD AUTO: 0.5 % (ref 0–1.5)
BILIRUB SERPL-MCNC: 0.4 MG/DL (ref 0.2–1.2)
BUN BLD-MCNC: 19 MG/DL (ref 6–20)
BUN/CREAT SERPL: 24.7 (ref 7.3–30)
CALCIUM SPEC-SCNC: 9.9 MG/DL (ref 8.5–10.2)
CHLORIDE SERPL-SCNC: 102 MMOL/L (ref 98–107)
CO2 SERPL-SCNC: 24.7 MMOL/L (ref 22–29)
CREAT BLD-MCNC: 0.77 MG/DL (ref 0.6–1.1)
DEPRECATED RDW RBC AUTO: 42.5 FL (ref 37–54)
EOSINOPHIL # BLD AUTO: 0.02 10*3/MM3 (ref 0–0.4)
EOSINOPHIL NFR BLD AUTO: 0.2 % (ref 0.3–6.2)
ERYTHROCYTE [DISTWIDTH] IN BLOOD BY AUTOMATED COUNT: 12.9 % (ref 12.3–15.4)
FERRITIN SERPL-MCNC: 73.2 NG/ML (ref 11–207)
GFR SERPL CREATININE-BSD FRML MDRD: 81 ML/MIN/1.73
GLOBULIN UR ELPH-MCNC: 3.3 GM/DL (ref 1.8–3.5)
GLUCOSE BLD-MCNC: 80 MG/DL (ref 74–124)
HCT VFR BLD AUTO: 41.8 % (ref 34–46.6)
HGB BLD-MCNC: 13.2 G/DL (ref 12–15.9)
IMM GRANULOCYTES # BLD AUTO: 0.05 10*3/MM3 (ref 0–0.05)
IMM GRANULOCYTES NFR BLD AUTO: 0.4 % (ref 0–0.5)
IRON 24H UR-MRATE: 108 MCG/DL (ref 37–145)
IRON SATN MFR SERPL: 26 % (ref 14–48)
LYMPHOCYTES # BLD AUTO: 3.71 10*3/MM3 (ref 0.7–3.1)
LYMPHOCYTES NFR BLD AUTO: 29 % (ref 19.6–45.3)
MAGNESIUM SERPL-MCNC: 2 MG/DL (ref 1.8–2.5)
MCH RBC QN AUTO: 28.6 PG (ref 26.6–33)
MCHC RBC AUTO-ENTMCNC: 31.6 G/DL (ref 31.5–35.7)
MCV RBC AUTO: 90.7 FL (ref 79–97)
MONOCYTES # BLD AUTO: 0.87 10*3/MM3 (ref 0.1–0.9)
MONOCYTES NFR BLD AUTO: 6.8 % (ref 5–12)
NEUTROPHILS # BLD AUTO: 8.06 10*3/MM3 (ref 1.7–7)
NEUTROPHILS NFR BLD AUTO: 63.1 % (ref 42.7–76)
NRBC BLD AUTO-RTO: 0 /100 WBC (ref 0–0.2)
PHOSPHATE SERPL-MCNC: 4 MG/DL (ref 2.5–4.5)
PLATELET # BLD AUTO: 306 10*3/MM3 (ref 140–450)
PMV BLD AUTO: 11.2 FL (ref 6–12)
POTASSIUM BLD-SCNC: 3.8 MMOL/L (ref 3.5–4.7)
PROT SERPL-MCNC: 8.1 G/DL (ref 6.3–8)
RBC # BLD AUTO: 4.61 10*6/MM3 (ref 3.77–5.28)
SODIUM BLD-SCNC: 140 MMOL/L (ref 134–145)
TIBC SERPL-MCNC: 423 MCG/DL (ref 249–505)
TRANSFERRIN SERPL-MCNC: 302 MG/DL (ref 200–360)
VIT B12 BLD-MCNC: 685 PG/ML (ref 211–946)
WBC NRBC COR # BLD: 12.78 10*3/MM3 (ref 3.4–10.8)

## 2020-06-26 PROCEDURE — 82728 ASSAY OF FERRITIN: CPT

## 2020-06-26 PROCEDURE — 83735 ASSAY OF MAGNESIUM: CPT

## 2020-06-26 PROCEDURE — 82607 VITAMIN B-12: CPT | Performed by: INTERNAL MEDICINE

## 2020-06-26 PROCEDURE — 36415 COLL VENOUS BLD VENIPUNCTURE: CPT

## 2020-06-26 PROCEDURE — 83540 ASSAY OF IRON: CPT

## 2020-06-26 PROCEDURE — 25010000002 DENOSUMAB 60 MG/ML SOLUTION PREFILLED SYRINGE: Performed by: INTERNAL MEDICINE

## 2020-06-26 PROCEDURE — 84466 ASSAY OF TRANSFERRIN: CPT

## 2020-06-26 PROCEDURE — 84100 ASSAY OF PHOSPHORUS: CPT

## 2020-06-26 PROCEDURE — 96372 THER/PROPH/DIAG INJ SC/IM: CPT

## 2020-06-26 PROCEDURE — 85025 COMPLETE CBC W/AUTO DIFF WBC: CPT

## 2020-06-26 PROCEDURE — 80053 COMPREHEN METABOLIC PANEL: CPT

## 2020-06-26 PROCEDURE — 99214 OFFICE O/P EST MOD 30 MIN: CPT | Performed by: INTERNAL MEDICINE

## 2020-06-26 RX ORDER — PREDNISONE 10 MG/1
TABLET ORAL
COMMUNITY
Start: 2020-06-22 | End: 2020-07-31

## 2020-06-26 RX ADMIN — DENOSUMAB 60 MG: 60 INJECTION SUBCUTANEOUS at 11:22

## 2020-06-27 NOTE — PROGRESS NOTES
Subjective .     REASONS FOR FOLLOWUP:    1. Stage IIA ([id number]) left breast cancer:  Status post left mastectomy with left axillary lymph node dissection 03/12/2013.  Lesion #1 invasive ductal carcinoma grade 2, 1.5 cm ER positive (90%), IN positive (1-4%), HER-2/jodi FISH negative (ratio 1.4),  Ki-67 score of 8%.  Lesion #2 invasive ductal carcinoma, grade 2, 1.7 cm,  ER positive (70%), IN negative (%),HER-2/jodi FISH positive (ratio of 3.0).  3 of 15 lymph nodes positive (2 macrometastases, 1 micrometastasis, largest lesion 2.5 cm replacing lymph node).  Lymph node evaluated and is ER positive (90%), IN positive (30%), HER-2/jodi FISH negative (ratio of 1.4).    2. BRCA-1 and 2 testing negative.  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  3. Participation in the [employer] Clinical trial.   4. Status post Mediport placement by Dr. Neumann 04/08/2013.  Mediport removed by Dr. Mabry 07/21/2014.  5. Initiation of adjuvant chemotherapy 04/16/2013.  Treatment planned:  dose-dense Adriamycin and Cytoxan with Neulasta support q.2 weeks x4 cycles.  Subsequent weekly Taxol and Herceptin x12 weeks.  Subsequent continuation of Herceptin q.3 weeks to complete one year.  Adjuvant endocrine therapy after completion of chemotherapy with tamoxifen (premenopausal).   Adjuvant radiation therapy following completion of chemotherapy.  Last dose of Taxol administered 08/27/2013. Tamoxifen initiated 09/25/2013 (plan to treat for 10 years).   Final dose of Herceptin administered 05/21/2014 (one year of treatment completed 06/11/2014).   6. 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  7. Borderline elevated transaminases. CT abdomen 10/30/2013 with normal appearing liver. Suspected steatohepatitis.   8. Left foot plantar fasciitis with stress fracture of the 4th metatarsal, status post evaluation with bone scan, 09/29/2014 and MRI of the left foot, 10/08/2014. Negative for  metastatic disease.    9. Lymphedema of the left upper extremity with compression sleeve in place and having been evaluated in the lymphedema clinic.   10. Left wrist tenosynovitis identified on MRI of 06/18/2015. Status post steroid injection by orthopedics, on 07/18/2015.   11. Anemia with hemoglobin decreased to 10.8 on 3/30/16.  Evaluation revealing evidence of iron deficiency (related to menstrual blood loss) and B12 deficiency.  Initiated oral iron sulfate 325 mg twice a day as well as B12 replacement with weekly injections ×4 and subsequent monthly injections with intended transition to oral B12 1000 µg daily, however, patient did not follow through.  Patient also discontinued oral iron.  Recurrent iron deficiency with oral iron resumed 5/4/17 twice a day dosing, again discontinued due to poor tolerance.  Administration of Feraheme 11/15 and 11/22/17.  12. Vitamin D deficiency continuing on vitamin D 800 units daily.  25 hydroxy vitamin D level normal on 8/6/2019 at 34.8.  13. Positive rheumatoid factor 4/28/17 (16.4), referred to rheumatology for further evaluation.  Initiated treatment with Plaquenil per rheumatology.  14. Status post KATE/BSO 6/5/18 (benign pathologic findings).  Transition from tamoxifen to adjuvant Arimidex 11/20/18 due to surgical postmenopausal state.  15. Osteopenia: DEXA scan 10/12/18 with maximal T score -1.4 in the lumbar spine, monitor annually on aromatase inhibitor therapy.  Calcium and vitamin D supplementation.  16. Gastric bypass performed 11/13/2019.    HISTORY OF PRESENT ILLNESS:  The patient is a 44 y.o. year old female who is here for follow-up with the above-mentioned history.    History of Present Illness the patient returns today continuing on adjuvant Arimidex 1 mg daily.  The patient has also been continuing on calcium 1200 mg and vitamin D 800 units daily.  The patient returns today in follow-up doing very well.  She has continued to lose weight after gastric bypass,  overall down from 296 pounds down to 233 pounds in the last visit and 204 pounds today although she notes on her scales at home she is less than 200 pounds.  She feels much better with the weight loss.  She is more active.  She did develop some poison ivy on her arms bilaterally and there was currently completing a taper dose of prednisone.  The rash is improving.  She continues to tolerate Arimidex well with no significant side effects.  She has not specifically been taking oral B12 but is taking a multivitamin which contains 500 mcg of B12 as well as iron.    PAST MEDICAL HISTORY:    1. Hypertension.  2. History of menorrhagia status post [facility] with Dr. Elda Roland 16.  Pathology showed polypoid endometrium with disordered proliferation but no evidence of malignancy.  Menstrual blood loss ceased.  3. History of phentermine use for weight loss.  4. Status post knee surgery after traumatic fracture with hardware in place in .  5. Status post  x2.  6. Status post Mediport placement by Dr. Nuemann on 2013. Mediport removed by Dr. Mabry 2014.  7. Left foot plantar fasciitis with stress fracture of the left 4th metatarsal, 2014.   8. Osteopenia.   9. Vitamin D deficiency.   10. Anxiety/depression.  Hospitalization required in 10/2015 due to major depressive disorder with psychotic features.   11. Anemia secondary to iron deficiency (menstrual blood loss) and B12 deficiency.  12. Suspected early rheumatoid arthritis initiated Plaquenil under the direction of Dr. Lopez in rheumatology in May 2017.  Positive rheumatoid factor 16.4 on 17.  13. Status post right carpal tunnel release surgery in 2017  14. Diabetes mellitus  15. Status post KATE/BSO 18    OB-GYN HISTORY:   G3, P3, A0, menarche age 10.  The patient has had regular menstrual cycles, however, has some degree of menorrhagia.  She was on oral contraceptives for 3-4 years in her late teens to early 20s. Menstrual  cycles ceased with the 1st cycle of chemotherapy.     Menstrual cycles resumed in 2015. Endometrial biopsy performed due to endometrial thickening in 2015 and again 16 with benign pathology.  Recurrent severe dysfunctional uterine bleeding in early 2016 causing anemia.  D&C 16-  pathology showed polypoid endometrium with disordered proliferation but no evidence of malignancy.   Past Medical History:   Diagnosis Date   • Anxiety    • Arthritis    • Depression    • H/O Borderline vitamin D deficiency    • H/O Grade 1 peripheral neuropathy     Involving feet.   • H/O Iron deficiency anemia     Presumed secondary to menstrual loss.   • H/O Lymphedema of left upper extremity    • Hx of being hospitalized 10/2015    Due to major depressive disorder with psychotic features.   • Hypertension    • Malignant neoplasm of breast (CMS/HCC) 2013    LEFT SIDE WITH MASTECTOMY (BRCA-1 and 2 negative)   • Menorrhagia    • Osteopenia    • Plantar fasciitis     LEFT FOOT   • Rash     BILATERAL LOWER LEGS CAUSED BY CHEMO   • Sleep apnea     USES CPAP   • Tenosynovitis of left wrist    • Vitamin D deficiency     BORDERLINE     Past Surgical History:   Procedure Laterality Date   •  SECTION  , 2002    x2   • D&C HYSTEROSCOPY N/A 2016    Procedure: DILATATION AND CURETTAGE HYSTEROSCOPY;  Surgeon: Elda Roland MD;  Location: [facility];  Service:    • KNEE SURGERY Right     After traumatic fracture with hardware in place.   • MEDIPORT INSERTION, DOUBLE Right    • MEDIPORT REMOVAL Right 2014    By Dr. Mabry.   • MODIFIED RADICAL MASTECTOMY W/ AXILLARY LYMPH NODE DISSECTION Left 2013   • TOTAL LAPAROSCOPIC HYSTERECTOMY WITH TRANSVAGINAL TAPING Bilateral 2018    Procedure: TOTAL LAPAROSCOPIC HYSTERECTOMY SHA SALPNGO OOPHORECTOMY TENSION FREE VAGINAL TAPING CYSTOSCOPY;  Surgeon: Jocelynn Zimmerman MD;  Location: [facility] OR;  Service: Gynecology     ONCOLOGIC HISTORY:   (History from previous dates can be found in the separate document.)    Current Outpatient Medications on File Prior to Visit   Medication Sig Dispense Refill   • anastrozole (ARIMIDEX) 1 MG tablet TAKE ONE TABLET BY MOUTH DAILY 30 tablet 4   • escitalopram (LEXAPRO) 20 MG tablet Take 20 mg by mouth daily.     • hydroxychloroquine (PLAQUENIL) 200 MG tablet Take 400 mg by mouth Daily.     • LORazepam (ATIVAN) 1 MG tablet Take 1 mg by mouth 3 (three) times a day.     • Multiple Vitamins-Minerals (OPURITY PO) Take  by mouth Daily.     • predniSONE (DELTASONE) 10 MG tablet      • vitamin D (ERGOCALCIFEROL) 77185 UNITS capsule capsule Take 1 capsule by mouth 1 (One) Time Per Week. For 8 weeks 8 capsule 0   • [DISCONTINUED] losartan-hydrochlorothiazide (HYZAAR) 100-25 MG per tablet Take 1 tablet by mouth Every Night.     • [DISCONTINUED] metoprolol succinate XL (TOPROL-XL) 50 MG 24 hr tablet Take 50 mg by mouth Every Night.     • [DISCONTINUED] TRULICITY 0.75 MG/0.5ML solution pen-injector        Current Facility-Administered Medications on File Prior to Visit   Medication Dose Route Frequency Provider Last Rate Last Dose   • cyanocobalamin injection 1,000 mcg  1,000 mcg Intramuscular Once Glenn Lyon Jr., MD           ALLERGIES:     Allergies   Allergen Reactions   • Shellfish-Derived Products GI Intolerance   • Shrimp (Diagnostic) Nausea And Vomiting   • Adhesive Tape Rash       SOCIAL HISTORY:  The patient is  and lives with her  in [place], [place].  She works as a teacher.  She denies history of smoking, denies significant alcohol use.    Social History     Socioeconomic History   • Marital status:      Spouse name: Jonathan   • Number of children: 3   • Years of education: College   • Highest education level: Not on file   Occupational History   • Occupation: Teacher     Employer: [employer]     Comment: Worked in marketing in past.   Tobacco Use   • Smoking status: Never Smoker   •  Smokeless tobacco: Never Used   Substance and Sexual Activity   • Alcohol use: Yes     Comment: Light (less than 1 per week)   • Drug use: No   • Sexual activity: Defer   Social History Narrative    Enjoys family activities, sports, 4-H leader/[employer]. College education.       FAMILY HISTORY:  Maternal grandmother had breast cancer in her 70s; maternal grandmother’s sister had ovarian cancer in her 70s, and another sister had non-Hodgkin's lymphoma in her 70s.  Maternal grandfather had prostate cancer late in life, and his sister had ovarian cancer later in life.  On paternal side there is a history of diabetes mellitus.  A sister has had issues with anemia and easy bruising.  The patient was seen in Genetics Clinic and underwent BRCA-1 and 2 testing prior to surgery which returned negative.      Family History   Problem Relation Age of Onset   • Cancer Other    • Stroke Other    • Diabetes Other    • Hypertension Other    • Other Other         cardiac disorder   • Ovarian cancer Other    • Lymphoma Other         NON-HODGKIN   • Diabetes insipidus Other    • Anemia Other    • Mental illness Other         Grandfather   • Arthritis Other    • Heart disease Other    • Breast cancer Maternal Grandmother         In her 70s.   • Prostate cancer Maternal Grandfather    • Other Sister         Anemia and easy bruising   • Hypertension Sister    • Hypertension Mother    • Mental illness Mother    • Hypertension Father    • Diabetes Father    • Malig Hyperthermia Neg Hx        Review of Systems   Constitutional: Negative for activity change, appetite change, fatigue, fever and unexpected weight change.   HENT: Negative for congestion, dental problem, mouth sores, nosebleeds and voice change.    Respiratory: Negative for cough and shortness of breath.    Cardiovascular: Negative for chest pain, palpitations and leg swelling.   Gastrointestinal: Negative for abdominal distention, abdominal pain, blood in stool, constipation,  diarrhea, nausea and vomiting.   Endocrine: Negative for cold intolerance and heat intolerance.   Genitourinary: Negative for difficulty urinating, dysuria, frequency, hematuria and menstrual problem.   Musculoskeletal: Negative for arthralgias, back pain, joint swelling and myalgias.   Skin: Positive for rash.   Neurological: Negative for dizziness, syncope, weakness, light-headedness, numbness and headaches.   Hematological: Negative for adenopathy. Does not bruise/bleed easily.   Psychiatric/Behavioral: Negative for confusion, dysphoric mood and sleep disturbance. The patient is not nervous/anxious.          Objective      Vitals:    06/26/20 1042   BP: 135/79   Pulse: 65   Resp: 16   Temp: 98.5 °F (36.9 °C)   SpO2: 96%      Current Status 6/26/2020   ECOG score 0   Pain 0/10    Physical Exam   Constitutional: She is oriented to person, place, and time. She appears well-developed and well-nourished.   HENT:   Mouth/Throat: Oropharynx is clear and moist.   Eyes: Conjunctivae are normal.   Neck: No thyromegaly present.   Cardiovascular: Normal rate and regular rhythm. Exam reveals no gallop and no friction rub.   No murmur heard.  Pulmonary/Chest: Effort normal and breath sounds normal. No respiratory distress. She has no wheezes.   Status post left mastectomy with postoperative and postradiation changes noted in the chest wall, no abnormalities palpated.  The right breast is without masses or abnormalities palpated.   Abdominal: Soft. Bowel sounds are normal. She exhibits no distension. There is no tenderness.   Musculoskeletal: She exhibits no edema.   Lymphadenopathy:        Head (right side): No submandibular adenopathy present.     She has no cervical adenopathy.     She has no axillary adenopathy.        Right: No inguinal and no supraclavicular adenopathy present.        Left: No inguinal and no supraclavicular adenopathy present.   Neurological: She is alert and oriented to person, place, and time. She  displays normal reflexes. No cranial nerve deficit. She exhibits normal muscle tone.   Skin: Skin is warm and dry. No rash noted.   Psychiatric: She has a normal mood and affect. Her behavior is normal.       RECENT LABS:  Hematology WBC   Date Value Ref Range Status   06/26/2020 12.78 (H) 3.40 - 10.80 10*3/mm3 Final     RBC   Date Value Ref Range Status   06/26/2020 4.61 3.77 - 5.28 10*6/mm3 Final     Hemoglobin   Date Value Ref Range Status   06/26/2020 13.2 12.0 - 15.9 g/dL Final     Hematocrit   Date Value Ref Range Status   06/26/2020 41.8 34.0 - 46.6 % Final     Platelets   Date Value Ref Range Status   06/26/2020 306 140 - 450 10*3/mm3 Final        Lab Results   Component Value Date    GLUCOSE 80 06/26/2020    BUN 19 06/26/2020    CREATININE 0.77 06/26/2020    EGFRIFNONA 81 06/26/2020    BCR 24.7 06/26/2020    CO2 24.7 06/26/2020    CALCIUM 9.9 06/26/2020    ALBUMIN 4.80 06/26/2020    AST 23 06/26/2020    ALT 28 06/26/2020         Assessment/Plan    1. Stage IIA ([id number]) left breast cancer:     · Status post left mastectomy with left axillary lymph node dissection 03/12/2013.  Lesion #1 invasive ductal carcinoma grade 2, 1.5 cm ER positive (90%), PA positive (1-4%), HER-2/jodi FISH negative (ratio 1.4),  Ki-67 score of 8%.  Lesion #2 invasive ductal carcinoma, grade 2, 1.7 cm,  ER positive (70%), PA negative (%),HER-2/jodi FISH positive (ratio of 3.0).  3 of 15 lymph nodes positive (2 macrometastases, 1 micrometastasis, largest lesion 2.5 cm replacing lymph node).  Lymph node evaluated and was ER positive (90%), PA positive (30%), HER-2/jodi FISH negative (ratio of 1.4).   · She received adjuvant dose-dense chemotherapy with AC x4 cycles followed by weekly Taxol x12 doses completed on 08/27/2013. She did receive Herceptin in conjunction with the Taxol and then completed a total of a year with last dose of Herceptin received on 05/21/2014 (3-week dose).   · She did initiate adjuvant tamoxifen on 09/25/2013  and we anticipate likely a 10-year course of treatment.   · BRCA-1 and 2 testing negative at time of diagnosis.  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  · Due to continued menorrhagia she underwent KATE/BSO on 6/5/18 with benign findings.    · DEXA scan 10/12/18 with evidence of osteopenia with maximal T score -1.4 the lumbar spine.    · Since the patient was rendered surgically postmenopausal with only mild osteopenia, she was transitioned from tamoxifen to adjuvant Arimidex on 11/21/18 with intent to complete 10 years of total adjuvant endocrine therapy (from time of initiation of tamoxifen 9/25/13).  · Patient returns today in follow-up continuing on adjuvant Arimidex.  She is tolerating this without any significant side effects.  She has no clinical evidence of recurrent disease.  Her exam today is negative.  She will be due for her annual mammogram in October and we will schedule this for her.  Now that she has lost a significant amount of weight and is just around or below 200 pounds we will refer her back to plastic surgery to consider the possibility of reconstructive surgery.  I will see her back in 6 months for follow-up.  She will be due for repeat 2-year interval DEXA scan at that time.  2. Depression/anxiety with prior psychotic episode:   · The patient required hospitalization in the behavioral health unit in October 2015.   · She has had difficulties with anxiety and depression in the past, however, given her breast cancer diagnosis as well as stressors from her job and family, it appears that these precipitated the event.    · She has stabilized and continues on Lexapro at 20 mg per day.   3. Rheumatoid arthritis:   · The patient was experiencing diffuse arthralgias as well as pain in her wrists right greater than left.    · We did perform an evaluation 4/28/17 with negative MARYSE panel however rheumatoid factor was positive at 16.4.    · She was referred to rheumatology   John in May 2017.   · The patient was felt to have early autoimmune disorder/rheumatoid arthritis.    · She was started on Plaquenil.    · Symptoms have stabilized and improved over time.    · She does continue routine follow-up with rheumatology.  4. Vitamin D deficiency:   · The patient has been on and off of vitamin D supplementation in the past.    · Borderline low 25 hydroxy vitamin D level on 5/14/2019 at 28.3 and was taking vitamin D 400 units over-the-counter daily, subsequently increased dose to 800 units daily.    · On 8/6/2019, 25 hydroxy vitamin D level had improved to 34.8.  5. Anemia:   · The patient developed significant anemia with a hemoglobin down to 10.8 on 3/30/16.    · Evaluation revealed iron deficiency with a ferritin of 12.5 iron percent saturation of 5 and a TIBC 535.    · She started oral iron sulfate 325 mg twice a day and was tolerating this very well.  Her hemoglobin improved into the 12 range.    · Her iron studies showed a significant improvement previously in October 2016 with ferritin up to 54, iron saturation 16%.    · She was asked to continue on oral iron twice a day however she did not do so.    · The patient also was found to have B12 deficiency with a level of 187 on 3/30/16.  She started B12 replacement and received weekly dosing ×4 followed bimonthly injections.  As it was difficult for her to come in to the office for B12 injections, she was asked to begin oral B12 1000 µg daily however she did not do this.    · On 4/28/17, she was mildly anemic with a hemoglobin of 11.4.  She had a thrombocytosis as well, related to iron deficiency.  Repeat iron studies 4/28/17 showed a ferritin that declined to 42, iron saturation low at 7%, elevated TIBC at 491. She was asked to resume oral iron twice daily.  B12 level 4/28/17 had remained in the low normal range at 370 and was monitored.    · On 7/14/17, B12 level was again low normal at 299 if she was asked to resume oral B12 however  she did not do so.    · She was intolerant of oral iron with diarrhea and abdominal cramping.    · Labs on 11/8/17 showed a declining iron saturation 9% and a slight decline in ferritin down to 39 with a TIBC at the 501.  Her hemoglobin was up to 12.8.  Oral iron was discontinued 11/8/17 and she proceeded on with Feraheme on 11/15/17 and 11/22/17.  She also resumed oral B12 daily.    · The patient had recurrent menorrhagia but underwent KATE/BSO on 6/5/18.    · B12 level on 5/14/2019 was 1053.    · On 1/17/2020, hemoglobin declined slightly to 11.7.  We did repeat her iron studies showing ferritin of 116, iron 27, iron saturation 8%, TIBC 325.  Her anemia may be related to recent gastric bypass procedure.   · Patient returns today with hemoglobin that has normalized at 13.2.  Additional labs reviewed with iron 108, ferritin 73.2, iron saturation improved at 26%, TIBC 423, B12 level 685.  The patient is currently receiving a multivitamin that contains 500 mcg B12 and iron.  She will continue on the supplement and we will repeat labs when she returns in 6 months again.  6. Elevated transaminases with evidence of hepatic steatosis:   · The patient has had intermittent mild elevation in transaminases.    · Viral hepatitis panel -5/14/2019.    · Hepatic ultrasound 5/28/2019 with borderline hepatomegaly with probable mild fatty infiltration with increased echotexture.    · Transaminases today are normal.  7. Left upper extremity lymphedema:   · The patient does wear her sleeve intermittently, is not wearing it today.    · Lymphedema is stable to improved.  8. Osteopenia:   · Previous DEXA scan 3/5/14 osteopenia with maximal T score of -1.1 in the lumbar spine.    · Surgically postmenopausal.    · Repeat DEXA scan in 10/12/18 with maximal T score -1.4 lumbar spine showing worsening since 2014.    · Aromatase inhibitor therapy initiated with Arimidex 1 mg daily on 11/20/18.    · One year follow-up DEXA scan 10/18/2019 showed  a 5% improvement in lumbar spine with T score 0.1, 23% decrease right femoral neck with T score -2.1, 18% improvement in left femoral neck with T score -0.3.  With decline in bone density in the right femoral neck approaching osteoporosis, recommended that we pursue treatment at this point as she continues on aromatase inhibitor therapy.  She is not a good candidate for oral Fosamax given her difficulties with gastritis and ulcer as well as gastric bypass.  Therefore initiated Prolia on 10/31/2019.    · The patient returns today in follow-up, delayed to receive her second dose of Prolia due to the viral pandemic.  We did proceed with treatment today.  We will plan a follow-up DEXA scan prior to her return visit and her next dose of Prolia.  9. Hypercalcemia:   · The patient had hypercalcemia on 5/14/2019 with calcium 10.8, albumin 4.6.  Intact PTH was suppressed at 12.8.  Suspected to be related to exogenous calcium supplementation.   · Patient currently continues on calcium 1200 mg daily with calcium of 9.9 today.  10. Obesity:   · The patient is status post gastric bypass at [facility] in [place] on 11/13/2019.    · The patient today has lost significant weight down from 296 to 204 pounds.  11. Reported gastritis/gastric ulcer:   · EGD performed prior to gastric bypass showed evidence of ulcer and gastritis.    · She was treated with Carafate and omeprazole with follow-up EGD on 10/14/2019 that showed improvement but did also show a hiatal hernia.     PLAN:    1. Proceed with Prolia today  2. Continue adjuvant Arimidex 1 mg daily  3. Continue calcium 1200 mg and vitamin D 800 units daily supplementation  4. Continue multivitamin daily which contains 500 mcg B12 as well as iron  5. Refer back to Dr. Montgomery in plastic surgery to consider reconstruction following recent weight loss  6. Annual mammogram in October 2020 on the right  7. MD visit in 6 months with CBC, CMP, magnesium, phosphorus, iron panel,  ferritin, B12 level.  Patient will be due for Prolia.  DEXA scan 1 week prior to visit.

## 2020-07-29 ENCOUNTER — TRANSCRIBE ORDERS (OUTPATIENT)
Dept: PREADMISSION TESTING | Facility: HOSPITAL | Age: 45
End: 2020-07-29

## 2020-07-29 DIAGNOSIS — Z01.818 OTHER SPECIFIED PRE-OPERATIVE EXAMINATION: Primary | ICD-10-CM

## 2020-07-31 ENCOUNTER — APPOINTMENT (OUTPATIENT)
Dept: PREADMISSION TESTING | Facility: HOSPITAL | Age: 45
End: 2020-07-31

## 2020-07-31 VITALS
SYSTOLIC BLOOD PRESSURE: 127 MMHG | HEIGHT: 66 IN | TEMPERATURE: 99.3 F | DIASTOLIC BLOOD PRESSURE: 82 MMHG | WEIGHT: 203 LBS | OXYGEN SATURATION: 98 % | HEART RATE: 86 BPM | BODY MASS INDEX: 32.62 KG/M2 | RESPIRATION RATE: 20 BRPM

## 2020-07-31 LAB
ANION GAP SERPL CALCULATED.3IONS-SCNC: 9 MMOL/L (ref 5–15)
BUN SERPL-MCNC: 8 MG/DL (ref 6–20)
BUN/CREAT SERPL: 13.3 (ref 7–25)
CALCIUM SPEC-SCNC: 8.7 MG/DL (ref 8.6–10.5)
CHLORIDE SERPL-SCNC: 105 MMOL/L (ref 98–107)
CO2 SERPL-SCNC: 26 MMOL/L (ref 22–29)
CREAT SERPL-MCNC: 0.6 MG/DL (ref 0.57–1)
DEPRECATED RDW RBC AUTO: 41.2 FL (ref 37–54)
ERYTHROCYTE [DISTWIDTH] IN BLOOD BY AUTOMATED COUNT: 12.5 % (ref 12.3–15.4)
GFR SERPL CREATININE-BSD FRML MDRD: 107 ML/MIN/1.73
GLUCOSE SERPL-MCNC: 87 MG/DL (ref 65–99)
HCT VFR BLD AUTO: 41.9 % (ref 34–46.6)
HGB BLD-MCNC: 13.3 G/DL (ref 12–15.9)
MCH RBC QN AUTO: 28.6 PG (ref 26.6–33)
MCHC RBC AUTO-ENTMCNC: 31.7 G/DL (ref 31.5–35.7)
MCV RBC AUTO: 90.1 FL (ref 79–97)
PLATELET # BLD AUTO: 395 10*3/MM3 (ref 140–450)
PMV BLD AUTO: 10.3 FL (ref 6–12)
POTASSIUM SERPL-SCNC: 3.9 MMOL/L (ref 3.5–5.2)
RBC # BLD AUTO: 4.65 10*6/MM3 (ref 3.77–5.28)
SODIUM SERPL-SCNC: 140 MMOL/L (ref 136–145)
WBC # BLD AUTO: 8.77 10*3/MM3 (ref 3.4–10.8)

## 2020-07-31 PROCEDURE — 85027 COMPLETE CBC AUTOMATED: CPT | Performed by: SURGERY

## 2020-07-31 PROCEDURE — 93005 ELECTROCARDIOGRAM TRACING: CPT

## 2020-07-31 PROCEDURE — 36415 COLL VENOUS BLD VENIPUNCTURE: CPT

## 2020-07-31 PROCEDURE — 80048 BASIC METABOLIC PNL TOTAL CA: CPT | Performed by: SURGERY

## 2020-07-31 PROCEDURE — 93010 ELECTROCARDIOGRAM REPORT: CPT | Performed by: INTERNAL MEDICINE

## 2020-08-01 ENCOUNTER — LAB (OUTPATIENT)
Dept: LAB | Facility: HOSPITAL | Age: 45
End: 2020-08-01

## 2020-08-01 DIAGNOSIS — Z01.818 OTHER SPECIFIED PRE-OPERATIVE EXAMINATION: ICD-10-CM

## 2020-08-01 PROCEDURE — C9803 HOPD COVID-19 SPEC COLLECT: HCPCS

## 2020-08-01 PROCEDURE — U0004 COV-19 TEST NON-CDC HGH THRU: HCPCS

## 2020-08-03 LAB
REF LAB TEST METHOD: NORMAL
SARS-COV-2 RNA RESP QL NAA+PROBE: NOT DETECTED

## 2020-08-04 ENCOUNTER — ANESTHESIA EVENT (OUTPATIENT)
Dept: PERIOP | Facility: HOSPITAL | Age: 45
End: 2020-08-04

## 2020-08-04 ENCOUNTER — ANESTHESIA (OUTPATIENT)
Dept: PERIOP | Facility: HOSPITAL | Age: 45
End: 2020-08-04

## 2020-08-04 ENCOUNTER — HOSPITAL ENCOUNTER (INPATIENT)
Facility: HOSPITAL | Age: 45
LOS: 3 days | Discharge: HOME | End: 2020-08-07
Attending: SURGERY | Admitting: SURGERY

## 2020-08-04 DIAGNOSIS — Z85.3 HISTORY OF BREAST CANCER: ICD-10-CM

## 2020-08-04 DIAGNOSIS — Z92.3 HISTORY OF RADIATION THERAPY: ICD-10-CM

## 2020-08-04 LAB — GLUCOSE BLDC GLUCOMTR-MCNC: 175 MG/DL (ref 70–130)

## 2020-08-04 PROCEDURE — 25010000002 METHYLPREDNISOLONE PER 125 MG: Performed by: SURGERY

## 2020-08-04 PROCEDURE — 25010000002 PROPOFOL 10 MG/ML EMULSION: Performed by: NURSE ANESTHETIST, CERTIFIED REGISTERED

## 2020-08-04 PROCEDURE — 25010000002 METHYLPREDNISOLONE PER 125 MG: Performed by: NURSE ANESTHETIST, CERTIFIED REGISTERED

## 2020-08-04 PROCEDURE — 94799 UNLISTED PULMONARY SVC/PX: CPT

## 2020-08-04 PROCEDURE — 25010000002 HYDROMORPHONE PER 4 MG: Performed by: SURGERY

## 2020-08-04 PROCEDURE — 25010000002 ROPIVACAINE PER 1 MG: Performed by: SURGERY

## 2020-08-04 PROCEDURE — 25010000002 MIDAZOLAM PER 1 MG: Performed by: ANESTHESIOLOGY

## 2020-08-04 PROCEDURE — C9290 INJ, BUPIVACAINE LIPOSOME: HCPCS | Performed by: SURGERY

## 2020-08-04 PROCEDURE — 25010000003 CEFAZOLIN PER 500 MG: Performed by: SURGERY

## 2020-08-04 PROCEDURE — 25010000002 FENTANYL CITRATE (PF) 100 MCG/2ML SOLUTION: Performed by: NURSE ANESTHETIST, CERTIFIED REGISTERED

## 2020-08-04 PROCEDURE — 88302 TISSUE EXAM BY PATHOLOGIST: CPT | Performed by: SURGERY

## 2020-08-04 PROCEDURE — 25010000002 NEOSTIGMINE PER 0.5 MG: Performed by: NURSE ANESTHETIST, CERTIFIED REGISTERED

## 2020-08-04 PROCEDURE — 0KXL0Z6 TRANSFER LEFT ABDOMEN MUSCLE, TRANSVERSE RECTUS ABDOMINIS MYOCUTANEOUS FLAP, OPEN APPROACH: ICD-10-PCS | Performed by: SURGERY

## 2020-08-04 PROCEDURE — 82962 GLUCOSE BLOOD TEST: CPT

## 2020-08-04 PROCEDURE — 25010000002 HYDROMORPHONE PER 4 MG: Performed by: NURSE ANESTHETIST, CERTIFIED REGISTERED

## 2020-08-04 PROCEDURE — 25010000002 ONDANSETRON PER 1 MG: Performed by: NURSE ANESTHETIST, CERTIFIED REGISTERED

## 2020-08-04 PROCEDURE — 25010000003 BUPIVACAINE LIPOSOME 1.3 % SUSPENSION: Performed by: SURGERY

## 2020-08-04 DEVICE — LIGACLIP MCA MULTIPLE CLIP APPLIERS, 20 MEDIUM CLIPS
Type: IMPLANTABLE DEVICE | Site: BREAST | Status: FUNCTIONAL
Brand: LIGACLIP

## 2020-08-04 RX ORDER — SODIUM CHLORIDE, SODIUM LACTATE, POTASSIUM CHLORIDE, AND CALCIUM CHLORIDE .6; .31; .03; .02 G/100ML; G/100ML; G/100ML; G/100ML
200 INJECTION, SOLUTION INTRAVENOUS CONTINUOUS
Status: DISCONTINUED | OUTPATIENT
Start: 2020-08-04 | End: 2020-08-04

## 2020-08-04 RX ORDER — MIDAZOLAM HYDROCHLORIDE 1 MG/ML
1 INJECTION INTRAMUSCULAR; INTRAVENOUS
Status: DISCONTINUED | OUTPATIENT
Start: 2020-08-04 | End: 2020-08-04 | Stop reason: HOSPADM

## 2020-08-04 RX ORDER — FENTANYL CITRATE 50 UG/ML
INJECTION, SOLUTION INTRAMUSCULAR; INTRAVENOUS AS NEEDED
Status: DISCONTINUED | OUTPATIENT
Start: 2020-08-04 | End: 2020-08-04 | Stop reason: SURG

## 2020-08-04 RX ORDER — FLUMAZENIL 0.1 MG/ML
0.2 INJECTION INTRAVENOUS AS NEEDED
Status: DISCONTINUED | OUTPATIENT
Start: 2020-08-04 | End: 2020-08-04 | Stop reason: HOSPADM

## 2020-08-04 RX ORDER — HYDROXYZINE PAMOATE 50 MG/1
50 CAPSULE ORAL ONCE
Status: COMPLETED | OUTPATIENT
Start: 2020-08-04 | End: 2020-08-04

## 2020-08-04 RX ORDER — GLYCOPYRROLATE 0.2 MG/ML
INJECTION INTRAMUSCULAR; INTRAVENOUS AS NEEDED
Status: DISCONTINUED | OUTPATIENT
Start: 2020-08-04 | End: 2020-08-04 | Stop reason: SURG

## 2020-08-04 RX ORDER — ACETAMINOPHEN 325 MG/1
650 TABLET ORAL ONCE AS NEEDED
Status: DISCONTINUED | OUTPATIENT
Start: 2020-08-04 | End: 2020-08-04 | Stop reason: HOSPADM

## 2020-08-04 RX ORDER — METHYLPREDNISOLONE SODIUM SUCCINATE 125 MG/2ML
INJECTION, POWDER, LYOPHILIZED, FOR SOLUTION INTRAMUSCULAR; INTRAVENOUS AS NEEDED
Status: DISCONTINUED | OUTPATIENT
Start: 2020-08-04 | End: 2020-08-04 | Stop reason: SURG

## 2020-08-04 RX ORDER — ACETAMINOPHEN 325 MG/1
975 TABLET ORAL ONCE
Status: COMPLETED | OUTPATIENT
Start: 2020-08-04 | End: 2020-08-04

## 2020-08-04 RX ORDER — ONDANSETRON 2 MG/ML
4 INJECTION INTRAMUSCULAR; INTRAVENOUS EVERY 6 HOURS PRN
Status: DISCONTINUED | OUTPATIENT
Start: 2020-08-04 | End: 2020-08-07 | Stop reason: HOSPADM

## 2020-08-04 RX ORDER — SODIUM CHLORIDE 0.9 % (FLUSH) 0.9 %
3-10 SYRINGE (ML) INJECTION AS NEEDED
Status: DISCONTINUED | OUTPATIENT
Start: 2020-08-04 | End: 2020-08-04 | Stop reason: HOSPADM

## 2020-08-04 RX ORDER — HYDRALAZINE HYDROCHLORIDE 20 MG/ML
5 INJECTION INTRAMUSCULAR; INTRAVENOUS
Status: DISCONTINUED | OUTPATIENT
Start: 2020-08-04 | End: 2020-08-04 | Stop reason: HOSPADM

## 2020-08-04 RX ORDER — CELECOXIB 200 MG/1
400 CAPSULE ORAL DAILY
Status: DISCONTINUED | OUTPATIENT
Start: 2020-08-05 | End: 2020-08-07 | Stop reason: HOSPADM

## 2020-08-04 RX ORDER — CLINDAMYCIN PHOSPHATE 900 MG/50ML
900 INJECTION INTRAVENOUS ONCE
Status: COMPLETED | OUTPATIENT
Start: 2020-08-04 | End: 2020-08-04

## 2020-08-04 RX ORDER — CELECOXIB 200 MG/1
400 CAPSULE ORAL ONCE
Status: COMPLETED | OUTPATIENT
Start: 2020-08-04 | End: 2020-08-04

## 2020-08-04 RX ORDER — FENTANYL CITRATE 50 UG/ML
50 INJECTION, SOLUTION INTRAMUSCULAR; INTRAVENOUS
Status: DISCONTINUED | OUTPATIENT
Start: 2020-08-04 | End: 2020-08-04 | Stop reason: HOSPADM

## 2020-08-04 RX ORDER — MAGNESIUM HYDROXIDE 1200 MG/15ML
LIQUID ORAL AS NEEDED
Status: DISCONTINUED | OUTPATIENT
Start: 2020-08-04 | End: 2020-08-04 | Stop reason: HOSPADM

## 2020-08-04 RX ORDER — SACCHAROMYCES BOULARDII 250 MG
500 CAPSULE ORAL 2 TIMES DAILY
Status: DISCONTINUED | OUTPATIENT
Start: 2020-08-04 | End: 2020-08-07 | Stop reason: HOSPADM

## 2020-08-04 RX ORDER — ESCITALOPRAM OXALATE 20 MG/1
20 TABLET ORAL NIGHTLY
Status: DISCONTINUED | OUTPATIENT
Start: 2020-08-04 | End: 2020-08-07 | Stop reason: HOSPADM

## 2020-08-04 RX ORDER — ROCURONIUM BROMIDE 10 MG/ML
INJECTION, SOLUTION INTRAVENOUS AS NEEDED
Status: DISCONTINUED | OUTPATIENT
Start: 2020-08-04 | End: 2020-08-04 | Stop reason: SURG

## 2020-08-04 RX ORDER — SODIUM CHLORIDE 0.9 % (FLUSH) 0.9 %
3 SYRINGE (ML) INJECTION EVERY 12 HOURS SCHEDULED
Status: DISCONTINUED | OUTPATIENT
Start: 2020-08-04 | End: 2020-08-04 | Stop reason: HOSPADM

## 2020-08-04 RX ORDER — EPHEDRINE SULFATE 50 MG/ML
5 INJECTION, SOLUTION INTRAVENOUS ONCE AS NEEDED
Status: DISCONTINUED | OUTPATIENT
Start: 2020-08-04 | End: 2020-08-04 | Stop reason: HOSPADM

## 2020-08-04 RX ORDER — ESMOLOL HYDROCHLORIDE 10 MG/ML
INJECTION INTRAVENOUS AS NEEDED
Status: DISCONTINUED | OUTPATIENT
Start: 2020-08-04 | End: 2020-08-04 | Stop reason: SURG

## 2020-08-04 RX ORDER — HYDROMORPHONE HYDROCHLORIDE 1 MG/ML
0.5 INJECTION, SOLUTION INTRAMUSCULAR; INTRAVENOUS; SUBCUTANEOUS
Status: DISCONTINUED | OUTPATIENT
Start: 2020-08-04 | End: 2020-08-04 | Stop reason: HOSPADM

## 2020-08-04 RX ORDER — OXYCODONE HYDROCHLORIDE AND ACETAMINOPHEN 5; 325 MG/1; MG/1
1 TABLET ORAL EVERY 4 HOURS PRN
Status: DISCONTINUED | OUTPATIENT
Start: 2020-08-04 | End: 2020-08-07 | Stop reason: HOSPADM

## 2020-08-04 RX ORDER — FAMOTIDINE 10 MG/ML
20 INJECTION, SOLUTION INTRAVENOUS ONCE
Status: COMPLETED | OUTPATIENT
Start: 2020-08-04 | End: 2020-08-04

## 2020-08-04 RX ORDER — PROPOFOL 10 MG/ML
VIAL (ML) INTRAVENOUS AS NEEDED
Status: DISCONTINUED | OUTPATIENT
Start: 2020-08-04 | End: 2020-08-04 | Stop reason: SURG

## 2020-08-04 RX ORDER — DIPHENHYDRAMINE HCL 25 MG
25 CAPSULE ORAL
Status: DISCONTINUED | OUTPATIENT
Start: 2020-08-04 | End: 2020-08-04 | Stop reason: HOSPADM

## 2020-08-04 RX ORDER — DIPHENHYDRAMINE HYDROCHLORIDE 50 MG/ML
12.5 INJECTION INTRAMUSCULAR; INTRAVENOUS
Status: DISCONTINUED | OUTPATIENT
Start: 2020-08-04 | End: 2020-08-04 | Stop reason: HOSPADM

## 2020-08-04 RX ORDER — CHLORHEXIDINE GLUCONATE 2 G/100ML
1 SOLUTION TOPICAL TAKE AS DIRECTED
COMMUNITY
End: 2020-08-07 | Stop reason: HOSPADM

## 2020-08-04 RX ORDER — ONDANSETRON 2 MG/ML
INJECTION INTRAMUSCULAR; INTRAVENOUS AS NEEDED
Status: DISCONTINUED | OUTPATIENT
Start: 2020-08-04 | End: 2020-08-04 | Stop reason: SURG

## 2020-08-04 RX ORDER — SODIUM CHLORIDE 0.9 % (FLUSH) 0.9 %
10 SYRINGE (ML) INJECTION AS NEEDED
Status: DISCONTINUED | OUTPATIENT
Start: 2020-08-04 | End: 2020-08-07 | Stop reason: HOSPADM

## 2020-08-04 RX ORDER — LORAZEPAM 1 MG/1
1 TABLET ORAL NIGHTLY
Status: DISCONTINUED | OUTPATIENT
Start: 2020-08-04 | End: 2020-08-07 | Stop reason: HOSPADM

## 2020-08-04 RX ORDER — HYDROMORPHONE HYDROCHLORIDE 1 MG/ML
0.25 INJECTION, SOLUTION INTRAMUSCULAR; INTRAVENOUS; SUBCUTANEOUS
Status: DISCONTINUED | OUTPATIENT
Start: 2020-08-04 | End: 2020-08-07 | Stop reason: HOSPADM

## 2020-08-04 RX ORDER — SODIUM CHLORIDE 0.9 % (FLUSH) 0.9 %
3 SYRINGE (ML) INJECTION EVERY 12 HOURS SCHEDULED
Status: DISCONTINUED | OUTPATIENT
Start: 2020-08-04 | End: 2020-08-07 | Stop reason: HOSPADM

## 2020-08-04 RX ORDER — CELECOXIB 200 MG/1
200 CAPSULE ORAL ONCE
Status: COMPLETED | OUTPATIENT
Start: 2020-08-04 | End: 2020-08-04

## 2020-08-04 RX ORDER — ANASTROZOLE 1 MG/1
1 TABLET ORAL DAILY
Status: DISCONTINUED | OUTPATIENT
Start: 2020-08-04 | End: 2020-08-07 | Stop reason: HOSPADM

## 2020-08-04 RX ORDER — LIDOCAINE HYDROCHLORIDE 20 MG/ML
INJECTION, SOLUTION INFILTRATION; PERINEURAL AS NEEDED
Status: DISCONTINUED | OUTPATIENT
Start: 2020-08-04 | End: 2020-08-04 | Stop reason: SURG

## 2020-08-04 RX ORDER — ONDANSETRON 4 MG/1
4 TABLET, FILM COATED ORAL EVERY 6 HOURS PRN
Status: DISCONTINUED | OUTPATIENT
Start: 2020-08-04 | End: 2020-08-07 | Stop reason: HOSPADM

## 2020-08-04 RX ORDER — SODIUM CHLORIDE, SODIUM LACTATE, POTASSIUM CHLORIDE, CALCIUM CHLORIDE 600; 310; 30; 20 MG/100ML; MG/100ML; MG/100ML; MG/100ML
9 INJECTION, SOLUTION INTRAVENOUS CONTINUOUS
Status: DISCONTINUED | OUTPATIENT
Start: 2020-08-04 | End: 2020-08-05

## 2020-08-04 RX ORDER — NALOXONE HCL 0.4 MG/ML
0.1 VIAL (ML) INJECTION
Status: DISCONTINUED | OUTPATIENT
Start: 2020-08-04 | End: 2020-08-07 | Stop reason: HOSPADM

## 2020-08-04 RX ORDER — SODIUM CHLORIDE, SODIUM LACTATE, POTASSIUM CHLORIDE, CALCIUM CHLORIDE 600; 310; 30; 20 MG/100ML; MG/100ML; MG/100ML; MG/100ML
50 INJECTION, SOLUTION INTRAVENOUS CONTINUOUS
Status: DISCONTINUED | OUTPATIENT
Start: 2020-08-04 | End: 2020-08-05

## 2020-08-04 RX ORDER — LIDOCAINE HYDROCHLORIDE 10 MG/ML
0.5 INJECTION, SOLUTION EPIDURAL; INFILTRATION; INTRACAUDAL; PERINEURAL ONCE AS NEEDED
Status: DISCONTINUED | OUTPATIENT
Start: 2020-08-04 | End: 2020-08-04 | Stop reason: HOSPADM

## 2020-08-04 RX ORDER — CYCLOBENZAPRINE HCL 10 MG
5 TABLET ORAL 3 TIMES DAILY PRN
Status: DISCONTINUED | OUTPATIENT
Start: 2020-08-04 | End: 2020-08-07 | Stop reason: HOSPADM

## 2020-08-04 RX ORDER — CLONIDINE 0.3 MG/24H
1 PATCH, EXTENDED RELEASE TRANSDERMAL ONCE
Status: DISCONTINUED | OUTPATIENT
Start: 2020-08-04 | End: 2020-08-04

## 2020-08-04 RX ORDER — SCOLOPAMINE TRANSDERMAL SYSTEM 1 MG/1
1 PATCH, EXTENDED RELEASE TRANSDERMAL ONCE
Status: DISCONTINUED | OUTPATIENT
Start: 2020-08-04 | End: 2020-08-04

## 2020-08-04 RX ORDER — BISACODYL 10 MG
10 SUPPOSITORY, RECTAL RECTAL DAILY
Status: DISCONTINUED | OUTPATIENT
Start: 2020-08-05 | End: 2020-08-07 | Stop reason: HOSPADM

## 2020-08-04 RX ORDER — ROPIVACAINE HYDROCHLORIDE 5 MG/ML
INJECTION, SOLUTION EPIDURAL; INFILTRATION; PERINEURAL AS NEEDED
Status: DISCONTINUED | OUTPATIENT
Start: 2020-08-04 | End: 2020-08-04 | Stop reason: HOSPADM

## 2020-08-04 RX ORDER — OXYCODONE AND ACETAMINOPHEN 10; 325 MG/1; MG/1
1 TABLET ORAL EVERY 4 HOURS PRN
Status: DISCONTINUED | OUTPATIENT
Start: 2020-08-04 | End: 2020-08-07 | Stop reason: HOSPADM

## 2020-08-04 RX ORDER — HYDROXYZINE PAMOATE 50 MG/1
50 CAPSULE ORAL 4 TIMES DAILY PRN
Status: DISCONTINUED | OUTPATIENT
Start: 2020-08-04 | End: 2020-08-07 | Stop reason: HOSPADM

## 2020-08-04 RX ORDER — METHOCARBAMOL 750 MG/1
750 TABLET, FILM COATED ORAL ONCE
Status: COMPLETED | OUTPATIENT
Start: 2020-08-04 | End: 2020-08-04

## 2020-08-04 RX ORDER — PROMETHAZINE HYDROCHLORIDE 25 MG/ML
12.5 INJECTION, SOLUTION INTRAMUSCULAR; INTRAVENOUS EVERY 6 HOURS PRN
Status: DISCONTINUED | OUTPATIENT
Start: 2020-08-04 | End: 2020-08-07 | Stop reason: HOSPADM

## 2020-08-04 RX ORDER — ONDANSETRON 2 MG/ML
4 INJECTION INTRAMUSCULAR; INTRAVENOUS ONCE AS NEEDED
Status: DISCONTINUED | OUTPATIENT
Start: 2020-08-04 | End: 2020-08-04 | Stop reason: HOSPADM

## 2020-08-04 RX ORDER — CYCLOBENZAPRINE HCL 10 MG
10 TABLET ORAL ONCE
Status: COMPLETED | OUTPATIENT
Start: 2020-08-04 | End: 2020-08-04

## 2020-08-04 RX ORDER — NALOXONE HCL 0.4 MG/ML
0.2 VIAL (ML) INJECTION AS NEEDED
Status: DISCONTINUED | OUTPATIENT
Start: 2020-08-04 | End: 2020-08-04 | Stop reason: HOSPADM

## 2020-08-04 RX ADMIN — CLINDAMYCIN PHOSPHATE 900 MG: 18 INJECTION, SOLUTION INTRAMUSCULAR; INTRAVENOUS at 07:35

## 2020-08-04 RX ADMIN — HYDROMORPHONE HYDROCHLORIDE 0.25 MG: 1 INJECTION, SOLUTION INTRAMUSCULAR; INTRAVENOUS; SUBCUTANEOUS at 18:49

## 2020-08-04 RX ADMIN — ROCURONIUM BROMIDE 10 MG: 10 INJECTION INTRAVENOUS at 10:56

## 2020-08-04 RX ADMIN — FENTANYL CITRATE 50 MCG: 50 INJECTION, SOLUTION INTRAMUSCULAR; INTRAVENOUS at 14:21

## 2020-08-04 RX ADMIN — SODIUM CHLORIDE, POTASSIUM CHLORIDE, SODIUM LACTATE AND CALCIUM CHLORIDE: 600; 310; 30; 20 INJECTION, SOLUTION INTRAVENOUS at 10:30

## 2020-08-04 RX ADMIN — SODIUM CHLORIDE, PRESERVATIVE FREE 3 ML: 5 INJECTION INTRAVENOUS at 20:28

## 2020-08-04 RX ADMIN — CYCLOBENZAPRINE 10 MG: 10 TABLET, FILM COATED ORAL at 15:01

## 2020-08-04 RX ADMIN — FENTANYL CITRATE 25 MCG: 50 INJECTION INTRAMUSCULAR; INTRAVENOUS at 12:20

## 2020-08-04 RX ADMIN — FENTANYL CITRATE 50 MCG: 50 INJECTION INTRAMUSCULAR; INTRAVENOUS at 10:56

## 2020-08-04 RX ADMIN — SODIUM CHLORIDE, POTASSIUM CHLORIDE, SODIUM LACTATE AND CALCIUM CHLORIDE: 600; 310; 30; 20 INJECTION, SOLUTION INTRAVENOUS at 08:58

## 2020-08-04 RX ADMIN — CELECOXIB 200 MG: 200 CAPSULE ORAL at 15:01

## 2020-08-04 RX ADMIN — ROCURONIUM BROMIDE 30 MG: 10 INJECTION INTRAVENOUS at 09:25

## 2020-08-04 RX ADMIN — GLYCOPYRROLATE 0.6 MG: 0.2 INJECTION INTRAMUSCULAR; INTRAVENOUS at 11:19

## 2020-08-04 RX ADMIN — ROCURONIUM BROMIDE 20 MG: 10 INJECTION INTRAVENOUS at 08:16

## 2020-08-04 RX ADMIN — FENTANYL CITRATE 25 MCG: 50 INJECTION INTRAMUSCULAR; INTRAVENOUS at 12:05

## 2020-08-04 RX ADMIN — NEOSTIGMINE METHYLSULFATE 3 MG: 1 INJECTION INTRAMUSCULAR; INTRAVENOUS; SUBCUTANEOUS at 11:19

## 2020-08-04 RX ADMIN — HYDROMORPHONE HYDROCHLORIDE 0.5 MG: 1 INJECTION, SOLUTION INTRAMUSCULAR; INTRAVENOUS; SUBCUTANEOUS at 14:45

## 2020-08-04 RX ADMIN — OXYCODONE HYDROCHLORIDE AND ACETAMINOPHEN 1 TABLET: 10; 325 TABLET ORAL at 20:28

## 2020-08-04 RX ADMIN — ESMOLOL HYDROCHLORIDE 10 MG: 100 INJECTION, SOLUTION INTRAVENOUS at 11:33

## 2020-08-04 RX ADMIN — FAMOTIDINE 20 MG: 10 INJECTION INTRAVENOUS at 06:45

## 2020-08-04 RX ADMIN — SODIUM CHLORIDE, POTASSIUM CHLORIDE, SODIUM LACTATE AND CALCIUM CHLORIDE 100 ML/HR: 600; 310; 30; 20 INJECTION, SOLUTION INTRAVENOUS at 23:09

## 2020-08-04 RX ADMIN — PROPOFOL 150 MG: 10 INJECTION, EMULSION INTRAVENOUS at 07:30

## 2020-08-04 RX ADMIN — ESMOLOL HYDROCHLORIDE 10 MG: 100 INJECTION, SOLUTION INTRAVENOUS at 11:29

## 2020-08-04 RX ADMIN — ROCURONIUM BROMIDE 40 MG: 10 INJECTION INTRAVENOUS at 07:30

## 2020-08-04 RX ADMIN — ROCURONIUM BROMIDE 20 MG: 10 INJECTION INTRAVENOUS at 08:37

## 2020-08-04 RX ADMIN — ROCURONIUM BROMIDE 20 MG: 10 INJECTION INTRAVENOUS at 10:15

## 2020-08-04 RX ADMIN — ESMOLOL HYDROCHLORIDE 5 MG: 100 INJECTION, SOLUTION INTRAVENOUS at 10:59

## 2020-08-04 RX ADMIN — SCOPALAMINE 1 PATCH: 1 PATCH, EXTENDED RELEASE TRANSDERMAL at 06:27

## 2020-08-04 RX ADMIN — SODIUM CHLORIDE, POTASSIUM CHLORIDE, SODIUM LACTATE AND CALCIUM CHLORIDE: 600; 310; 30; 20 INJECTION, SOLUTION INTRAVENOUS at 07:51

## 2020-08-04 RX ADMIN — Medication 500 MG: at 20:28

## 2020-08-04 RX ADMIN — METHOCARBAMOL TABLETS 750 MG: 750 TABLET, COATED ORAL at 06:26

## 2020-08-04 RX ADMIN — LORAZEPAM 1 MG: 1 TABLET ORAL at 20:28

## 2020-08-04 RX ADMIN — METHYLPREDNISOLONE SODIUM SUCCINATE 250 MG: 125 INJECTION, POWDER, FOR SOLUTION INTRAMUSCULAR; INTRAVENOUS at 07:42

## 2020-08-04 RX ADMIN — HYDROXYZINE PAMOATE 50 MG: 50 CAPSULE ORAL at 06:27

## 2020-08-04 RX ADMIN — ONDANSETRON HYDROCHLORIDE 4 MG: 2 SOLUTION INTRAMUSCULAR; INTRAVENOUS at 11:15

## 2020-08-04 RX ADMIN — SODIUM CHLORIDE, POTASSIUM CHLORIDE, SODIUM LACTATE AND CALCIUM CHLORIDE 200 ML/HR: 600; 310; 30; 20 INJECTION, SOLUTION INTRAVENOUS at 06:02

## 2020-08-04 RX ADMIN — ACETAMINOPHEN 975 MG: 325 TABLET, FILM COATED ORAL at 06:26

## 2020-08-04 RX ADMIN — SODIUM CHLORIDE 250 MG: 9 INJECTION, SOLUTION INTRAVENOUS at 06:44

## 2020-08-04 RX ADMIN — MIDAZOLAM 1 MG: 1 INJECTION INTRAMUSCULAR; INTRAVENOUS at 06:44

## 2020-08-04 RX ADMIN — ESCITALOPRAM 20 MG: 20 TABLET, FILM COATED ORAL at 20:28

## 2020-08-04 RX ADMIN — CLONIDINE 1 PATCH: 0.3 PATCH, EXTENDED RELEASE TRANSDERMAL at 06:52

## 2020-08-04 RX ADMIN — NITROGLYCERIN 0.5 INCH: 20 OINTMENT TOPICAL at 20:28

## 2020-08-04 RX ADMIN — CELECOXIB 400 MG: 200 CAPSULE ORAL at 06:26

## 2020-08-04 RX ADMIN — SODIUM CHLORIDE, POTASSIUM CHLORIDE, SODIUM LACTATE AND CALCIUM CHLORIDE 750 ML: 600; 310; 30; 20 INJECTION, SOLUTION INTRAVENOUS at 07:52

## 2020-08-04 RX ADMIN — FENTANYL CITRATE 100 MCG: 50 INJECTION INTRAMUSCULAR; INTRAVENOUS at 07:28

## 2020-08-04 RX ADMIN — LIDOCAINE HYDROCHLORIDE 100 MG: 20 INJECTION, SOLUTION INFILTRATION; PERINEURAL at 07:28

## 2020-08-04 NOTE — ANESTHESIA POSTPROCEDURE EVALUATION
Patient: Yu Thakur    Procedure Summary     Date:  08/04/20 Room / Location:  [facility] OR 02 / [facility] MAIN OR    Anesthesia Start:  0721 Anesthesia Stop:  1308    Procedure:  LEFT TRANSVERS RECTIS ABDOMINAL MUSCLE FLAP (Left Breast) Diagnosis:      Surgeon:  KAUSHAL Montgomery MD Provider:  Israel Valle MD    Anesthesia Type:  general ASA Status:  3          Anesthesia Type: general    Vitals  Vitals Value Taken Time   BP     Temp     Pulse 115 8/4/2020  1:27 PM   Resp     SpO2 99 % 8/4/2020  1:27 PM   Vitals shown include unvalidated device data.        Post Anesthesia Care and Evaluation    Patient location during evaluation: PACU  Patient participation: complete - patient participated  Level of consciousness: awake and alert  Pain management: adequate  Airway patency: patent  Anesthetic complications: No anesthetic complications    Cardiovascular status: acceptable  Respiratory status: acceptable  Hydration status: acceptable    Comments: --------------------            08/04/20               0647     --------------------   BP:                  Pulse:      59       Resp:                Temp:                SpO2:      96%      --------------------

## 2020-08-04 NOTE — OP NOTE
Preoperative diagnosis: Acquired absence of left breast, history of left breast cancer history of previous mastectomy and radiation therapy to left chest wall 2013  Postoperative diagnosis: Same  Procedure: Left breast reconstruction with mid abdominal TRAM flap( left pedicle)  Surgeon: Jeremiah Montgomery MD  Assistant Rod Murrieta, surgical tech  Anesthesia: General tracheal  Estimated blood loss 50 cc  Complications none apparent  Drains x4  Indications for procedure this patient underwent mastectomy chemotherapy and radiation therapy in 2013 she had a very high BMI at that time and she has been able to lose weight with a gastric bypass last year and has lost over 120 pounds she is now ready for reconstruction of her left breast she had radiation therapy and has a heavy scar and very thin mastectomy skin and a depressed mastectomy deformity with radiation changes.  She understands the risk benefits and complications as outlined in the informed consent from the [employer] of plastic surgeons I reviewed the going home instructions as well as a brochure from the [employer] of plastic surgeons on multiple occasions regarding TRAM flap she is ready to proceed.  She is marked prior to surgery and all precautions regarding COVID-19 were followed pre-and intra-and postoperatively  Procedure: Patient's brought the operating room placed operating table in a supine position.  She is prepped and draped in a sterile fashion and we initiated surgery by opening her previous mastectomy incision and elevating the superior and inferior mastectomy flaps to the limits of our marks pain excellent hemostasis the skin was quite thin and quite tight we packed the mastectomy pocket with laparotomy sponges moistened with antibiotic containing saline and returned our attention to the abdomen where he made our incisions for a midabdominal TRAM flap given her previous high BMI her overhanging panniculus was deemed  unacceptable for TRAM flap reconstruction and we elected to go with a mid abdominal left pedicled TRAM flap we elevated the skin and subcutaneous tissues down to the fascia elevated superior flap to the epigastrium communicating it with the mastectomy defect we then dissected the flap off of the fascia on the right side we spared the umbilicus on a well vascularized stalk and stopped short when we reached the medial perforators we then elevated the lateral portion of the flap stopped short of the lateral perforators overlying the left rectus muscle.  We resected the right most portion of the flap that we would not be using we then made an incision in the inferior fascia inferior to the flap identified the rectus muscle in its midportion carefully divided this and cauterized the edges and then if identified the deep inferior epigastric artery and vein carefully isolated clipped and divided this we then harvested the muscle from its bed sparing the medial and lateral perforators going into the flap leaving a strip of muscle medially and laterally on each side.  As we went we divided the intercostal contributions as we went as we reached the rib margin we identified the superior epigastric artery and vein and carefully preserved these we dissected the muscle and freed it from the rib margin mobilizing it so it would turn easily we did divide the intercostal contributions as well as the costal margin all contributions.  We resected the left tip of the flap and waited the flap we what we had at that point weight approximately 1300 g of tissue.  We resected additional skin from the mastectomy site and submitted this is permanent specimen skin and scar we obtained excellent hemostasis we then carefully passed the TRAM flap through the epigastric tunnel it passed relatively easily we positioned it appropriately the muscle was without any kinking torsion or twisting and there was no undue tension on the pedicle and there was  excellent circulation without venous congestion noted to the flap.  We temporarily secured it with surgical staples and returned our attention to the abdomen or we closed the fascia of the left rectus sheath in layers the muscle edges that we had left medially and laterally were reapproximated with interrupted figure-of-eight 0 Vicryl sutures and the fascia was closed with 2 layers of 0 loop nylon and we imbricated the right rectus sheath with 2 layers of 0 loop nylon.  We injected the lateral aspects of her abdominal musculature with 20 cc of Exparel we injected the periphery of the mastectomy pocket with 30 cc of half percent ropivacaine all of this for postoperative analgesia and less narcotic use.  We then placed the patient in a flexed position we irrigated with antibiotic containing saline placed 2-15 Divehi Mickey drains one on each side and then brought the donor site incision edges together we tacked the superior and inferior flaps with interrupted 0 Vicryl sutures in several locations in a quilting fashion.  Hemostasis was excellent the closure was quite secure with 2 layers of fascial closure and muscle reapproximation.  Skin edges came together nicely the umbilicus which had been spared on a generous stalk was brought out through the midportion of her abdominal incision closure we closed the superficial fascia with running 2 OV lock in the deep dermis with 3-0 Monocryl and 4-0 Vicryl and then a 4-0 PDS subcuticular suture was used to complete the layered closure we used additional surgical staples and Dermabond for additional support of the abdominal skin closure.  We then turned our attention to the TRAM flap on the chest wall and we carefully marked the appropriate areas to de-epithelialized and we de-epithelialized portions of the TRAM flap and tailored the TRAM flap and inset it with multiple 4-0 Vicryls and 5-0 PDS.  The TRAM flap was additionally shaped by resecting a wedge of tissue along the  inferior aspect to cone the flap into her breast like form and this was brought together 4-0 Vicryl 5-0 PDS as well we also attempted to release scar tissue laterally and toward the axilla and advance axillary fat toward the mastectomy defect with interrupted figure-of-eight 2-0 PDS sutures we placed a 15 Cook Islander Mickey drain and a 10 Cook Islander axion into the mastectomy pocket secured these with nylon sutures inspected our TRAM flap the pedicle was without any undue tension circulation was excellent hemostasis was excellent we then completed closure of the flap with 4-0 Vicryls and 5-0 PDS Dermabond was applied to all of the incisions we applied some nitroglycerin paste to some of the radiated skin toward the axilla and the inferior T juncture of our TRAM shaping incision gauze was applied at the drain exit sites and ABD pads and a light abdominal binder applied over the abdomen needle and sponge counts were correct x2 the patient tolerated procedure well and with recovery in satisfactory condition.

## 2020-08-04 NOTE — ANESTHESIA PROCEDURE NOTES
Airway  Urgency: elective    Date/Time: 8/4/2020 7:33 AM  Airway not difficult    General Information and Staff    Patient location during procedure: OR  Anesthesiologist: Israel Valle MD  CRNA: Antolin Haro CRNA    Indications and Patient Condition  Indications for airway management: airway protection    Preoxygenated: yes  Mask difficulty assessment: 1 - vent by mask    Final Airway Details  Final airway type: endotracheal airway      Successful airway: ETT  Cuffed: yes   Successful intubation technique: direct laryngoscopy  Endotracheal tube insertion site: oral  Blade: Olmedo  Blade size: 2  ETT size (mm): 7.0  Cormack-Lehane Classification: grade I - full view of glottis  Placement verified by: chest auscultation and capnometry   Measured from: lips  ETT/EBT  to lips (cm): 22  Number of attempts at approach: 1  Assessment: lips, teeth, and gum same as pre-op and atraumatic intubation    Additional Comments  Pre 02 100%, SIVI, DL x1, atraumatic intubation, BLBS, Positive ETC02.

## 2020-08-04 NOTE — ADDENDUM NOTE
Addendum  created 08/04/20 1329 by Israel Valle MD    Attestation recorded in Intraprocedure, Intraprocedure Attestations filed

## 2020-08-04 NOTE — PLAN OF CARE
Problem: Patient Care Overview  Goal: Plan of Care Review  Outcome: Ongoing (interventions implemented as appropriate)  Flowsheets (Taken 8/4/2020 8421)  Progress: no change  Plan of Care Reviewed With: patient  Outcome Summary: arrived from PACU,  room temperature 73 degrees, tachycardic, left TRAM pale with brisk cap refill and is warm, abdomen soft and flat with abdominal binder in place, ANDREI bulb x2 with small amt of bloody drainage, mejias with adequate UOP, sleepy since here from recovery - does awaken easily, scds on, tolerating ice chips at this time

## 2020-08-04 NOTE — ANESTHESIA PREPROCEDURE EVALUATION
Anesthesia Evaluation     Patient summary reviewed and Nursing notes reviewed                Airway   Mallampati: II  Dental      Pulmonary    (+) sleep apnea,   Cardiovascular     ECG reviewed  Rhythm: regular  Rate: normal    (+) hypertension,       Neuro/Psych  (+) numbness, psychiatric history Anxiety and Depression,     GI/Hepatic/Renal/Endo    (+) obesity,   diabetes mellitus,     Musculoskeletal     Abdominal    Substance History - negative use     OB/GYN negative ob/gyn ROS         Other   arthritis,    history of cancer                    Anesthesia Plan    ASA 3     general     intravenous induction     Anesthetic plan, all risks, benefits, and alternatives have been provided, discussed and informed consent has been obtained with: patient.

## 2020-08-05 LAB
CYTO UR: NORMAL
LAB AP CASE REPORT: NORMAL
PATH REPORT.FINAL DX SPEC: NORMAL
PATH REPORT.GROSS SPEC: NORMAL

## 2020-08-05 PROCEDURE — 25010000002 ENOXAPARIN PER 10 MG: Performed by: SURGERY

## 2020-08-05 RX ORDER — DOCUSATE SODIUM 100 MG/1
100 CAPSULE, LIQUID FILLED ORAL 2 TIMES DAILY
Status: DISCONTINUED | OUTPATIENT
Start: 2020-08-05 | End: 2020-08-07 | Stop reason: HOSPADM

## 2020-08-05 RX ORDER — POLYETHYLENE GLYCOL 3350 17 G/17G
17 POWDER, FOR SOLUTION ORAL DAILY
Status: DISCONTINUED | OUTPATIENT
Start: 2020-08-05 | End: 2020-08-07 | Stop reason: HOSPADM

## 2020-08-05 RX ADMIN — Medication 500 MG: at 09:29

## 2020-08-05 RX ADMIN — CELECOXIB 400 MG: 200 CAPSULE ORAL at 09:29

## 2020-08-05 RX ADMIN — Medication 500 MG: at 21:14

## 2020-08-05 RX ADMIN — OXYCODONE HYDROCHLORIDE AND ACETAMINOPHEN 1 TABLET: 10; 325 TABLET ORAL at 04:34

## 2020-08-05 RX ADMIN — NITROGLYCERIN 1 INCH: 20 OINTMENT TOPICAL at 18:50

## 2020-08-05 RX ADMIN — CYCLOBENZAPRINE 5 MG: 10 TABLET, FILM COATED ORAL at 13:26

## 2020-08-05 RX ADMIN — OXYCODONE HYDROCHLORIDE AND ACETAMINOPHEN 1 TABLET: 5; 325 TABLET ORAL at 09:29

## 2020-08-05 RX ADMIN — LORAZEPAM 1 MG: 1 TABLET ORAL at 21:14

## 2020-08-05 RX ADMIN — CYCLOBENZAPRINE 5 MG: 10 TABLET, FILM COATED ORAL at 04:34

## 2020-08-05 RX ADMIN — DOCUSATE SODIUM 100 MG: 100 CAPSULE, LIQUID FILLED ORAL at 09:28

## 2020-08-05 RX ADMIN — SODIUM CHLORIDE, POTASSIUM CHLORIDE, SODIUM LACTATE AND CALCIUM CHLORIDE 50 ML/HR: 600; 310; 30; 20 INJECTION, SOLUTION INTRAVENOUS at 11:04

## 2020-08-05 RX ADMIN — OXYCODONE HYDROCHLORIDE AND ACETAMINOPHEN 1 TABLET: 5; 325 TABLET ORAL at 14:52

## 2020-08-05 RX ADMIN — ANASTROZOLE 1 MG: 1 TABLET ORAL at 09:29

## 2020-08-05 RX ADMIN — DOCUSATE SODIUM 100 MG: 100 CAPSULE, LIQUID FILLED ORAL at 21:14

## 2020-08-05 RX ADMIN — SODIUM CHLORIDE, PRESERVATIVE FREE 3 ML: 5 INJECTION INTRAVENOUS at 21:14

## 2020-08-05 RX ADMIN — ESCITALOPRAM 20 MG: 20 TABLET, FILM COATED ORAL at 21:14

## 2020-08-05 RX ADMIN — OXYCODONE HYDROCHLORIDE AND ACETAMINOPHEN 1 TABLET: 5; 325 TABLET ORAL at 19:27

## 2020-08-05 RX ADMIN — ENOXAPARIN SODIUM 30 MG: 30 INJECTION SUBCUTANEOUS at 09:28

## 2020-08-05 RX ADMIN — OXYCODONE HYDROCHLORIDE AND ACETAMINOPHEN 1 TABLET: 10; 325 TABLET ORAL at 00:32

## 2020-08-05 RX ADMIN — BISACODYL 10 MG: 10 SUPPOSITORY RECTAL at 09:28

## 2020-08-05 RX ADMIN — POLYETHYLENE GLYCOL 3350 17 G: 17 POWDER, FOR SOLUTION ORAL at 09:28

## 2020-08-05 NOTE — PROGRESS NOTES
Assessment/Plan Postop day #1 from TRAM flap breast reconstruction left side midabdominal TRAM flap patient is doing well plan today is for out of bed with assistance keeping flexed and remove Terry catheter decrease IV fluids try an increase diet    Subjective Patient with postop pain appropriate for TRAM flap, patient tolerating ice chips and crackers and sips    Temp:  [97.1 °F (36.2 °C)-99.8 °F (37.7 °C)] 98.2 °F (36.8 °C)  Heart Rate:  [] 94  Resp:  [12-18] 16  BP: ()/(51-76) 82/51  I/O last 3 completed shifts:  In: 5511 [P.O.:760; I.V.:4751]  Out: 2015 [Urine:1850; Drains:115; Blood:50]  No intake/output data recorded.    Objective TRAM flap has excellent circulation is warm no skin edge problems donor site looks good drains are appropriate calves are soft and nontender      History of breast cancer

## 2020-08-05 NOTE — PLAN OF CARE
Problem: Patient Care Overview  Goal: Plan of Care Review  Outcome: Ongoing (interventions implemented as appropriate)  Flowsheets (Taken 8/5/2020 1723)  Progress: improving  Plan of Care Reviewed With: patient  Outcome Summary: left TRAM flap pink and warm with brisk cap refill, abdomen incision intact with steristrips covered with abdominal binder, ANDREI x2 with small amt of  serosanguinous drainage, mejias was removed today and pt has been voiding well since it's removal, advanced diet to GI soft tonight for dinner - no c/o n/v today, sat in chair today x2, IS up to 1500 and coughing with pillow, iv saline locked

## 2020-08-05 NOTE — PROGRESS NOTES
Discharge Planning Assessment  [facility]     Patient Name: Yu Thakur  MRN: [id number]  Today's Date: 8/5/2020    Admit Date: 8/4/2020    Discharge Needs Assessment     Row Name 08/05/20 1308       Living Environment    Lives With  spouse;child(paula), dependent    Name(s) of Who Lives With Patient  Spouse: Maico Thakur and sons Onofre (age 17) and Juanito (age 20)    Current Living Arrangements  home/apartment/condo    Primary Care Provided by  self    Provides Primary Care For  no one    Family Caregiver if Needed  spouse    Quality of Family Relationships  helpful;supportive;involved    Able to Return to Prior Arrangements  yes       Transition Planning    Patient/Family Anticipates Transition to  home with family    Patient/Family Anticipated Services at Transition  none    Transportation Anticipated  family or friend will provide       Discharge Needs Assessment    Concerns to be Addressed  denies needs/concerns at this time    Equipment Currently Used at Home  bipap/cpap    Provided Post Acute Provider List?  N/A Denies needs. Plan is home        Discharge Plan     Row Name 08/05/20 1310       Plan    Plan  Home with family    Patient/Family in Agreement with Plan  yes    Plan Comments  Introduced self and role of CCP. Facesheet verified. Patient lives with spouse, Jonathan Thakur [phone]) and sons Simón (age 17) and Juanito (age 20) in a 2 story house with a basement. There is one step to enter and approx 10-12 steps to upper and lower levels. Master bedroom and master bath are located on the main level. Laundry is located in the lower level. Prior to admission, patient was independent with ADL's, worked full time and drove. Has used CPAP at  for approx 3-4 years. Obtains supplies through [employer]. Denies any supply needs. Has never used a HH agency nor been to a rehab facility. DC plan is to return home with family to assist as needed. Spouse will transport at [place]. Denies any  needs/equipment.                Demographic Summary     Row Name 08/05/20 1308       General Information    Admission Type  inpatient    Arrived From  home    Reason for Consult  discharge planning    Preferred Language  English     Used During This Interaction  no        Functional Status     Row Name 08/05/20 1308       Functional Status    Usual Activity Tolerance  good    Current Activity Tolerance  good       Functional Status, IADL    Medications  independent    Meal Preparation  independent    Housekeeping  independent    Laundry  independent    Shopping  independent       Mental Status    General Appearance WDL  WDL       Employment/    Employment Status  employed full time    Current or Previous Occupation  education        Psychosocial     Row Name 08/05/20 1308       Values/Beliefs    Spiritual, Cultural Beliefs, Taoism Practices, Values that Affect Care  no       Behavior WDL    Behavior WDL  WDL       Emotion Mood WDL    Emotion/Mood/Affect WDL  WDL       Speech WDL    Speech WDL  WDL       Intellectual Performance WDL    Level of Consciousness  Alert       Coping/Stress    Sources of Support  spouse    Reaction to Health Status  adjusting    Understanding of Condition and Treatment  adequate understanding of treatment       Developmental Stage (Eriksson's)    Developmental Stage  Stage 7 (35-65 years/Middle Adulthood) Generativity vs. Stagnation        Abuse/Neglect     Row Name 08/05/20 1308       Personal Safety    Feels Unsafe at Home or Work/School  no    Feels Threatened by Someone  no    Does Anyone Try to Keep You From Having Contact with Others or Doing Things Outside Your Home?  no    Physical Signs of Abuse Present  no            Kaylan Barraza RN

## 2020-08-05 NOTE — PLAN OF CARE
Problem: Patient Care Overview  Goal: Plan of Care Review  Outcome: Ongoing (interventions implemented as appropriate)  Flowsheets (Taken 8/5/2020 7219)  Progress: improving  Plan of Care Reviewed With: patient  Outcome Summary: vss, c/o pain medicated with percocet and flexeril, with mejias and 2 tiera's, lt breast flap warm with good capillary refill, nitro past applied last night, on bedrest and bed flexed at all time, abdominal binder in place, on ice chips with crackers tolerated, encourage to use incentive spirometer, continue to monitor the pt.

## 2020-08-06 PROCEDURE — 94799 UNLISTED PULMONARY SVC/PX: CPT

## 2020-08-06 PROCEDURE — 25010000002 ENOXAPARIN PER 10 MG: Performed by: SURGERY

## 2020-08-06 RX ORDER — IBUPROFEN 200 MG
TABLET ORAL DAILY
Status: DISCONTINUED | OUTPATIENT
Start: 2020-08-06 | End: 2020-08-07 | Stop reason: HOSPADM

## 2020-08-06 RX ADMIN — BACITRACIN ZINC, NEOMYCIN, POLYMYXIN B: 400; 3.5; 5 OINTMENT TOPICAL at 11:31

## 2020-08-06 RX ADMIN — Medication 500 MG: at 21:01

## 2020-08-06 RX ADMIN — DOCUSATE SODIUM 100 MG: 100 CAPSULE, LIQUID FILLED ORAL at 09:05

## 2020-08-06 RX ADMIN — LORAZEPAM 1 MG: 1 TABLET ORAL at 21:02

## 2020-08-06 RX ADMIN — NITROGLYCERIN 1 INCH: 20 OINTMENT TOPICAL at 23:55

## 2020-08-06 RX ADMIN — NITROGLYCERIN 1 INCH: 20 OINTMENT TOPICAL at 18:20

## 2020-08-06 RX ADMIN — CELECOXIB 400 MG: 200 CAPSULE ORAL at 09:04

## 2020-08-06 RX ADMIN — CYCLOBENZAPRINE 5 MG: 10 TABLET, FILM COATED ORAL at 03:57

## 2020-08-06 RX ADMIN — SODIUM CHLORIDE, PRESERVATIVE FREE 3 ML: 5 INJECTION INTRAVENOUS at 22:58

## 2020-08-06 RX ADMIN — OXYCODONE HYDROCHLORIDE AND ACETAMINOPHEN 1 TABLET: 5; 325 TABLET ORAL at 16:40

## 2020-08-06 RX ADMIN — Medication 500 MG: at 09:04

## 2020-08-06 RX ADMIN — NITROGLYCERIN 1 INCH: 20 OINTMENT TOPICAL at 09:21

## 2020-08-06 RX ADMIN — OXYCODONE HYDROCHLORIDE AND ACETAMINOPHEN 1 TABLET: 5; 325 TABLET ORAL at 10:16

## 2020-08-06 RX ADMIN — ESCITALOPRAM 20 MG: 20 TABLET, FILM COATED ORAL at 21:01

## 2020-08-06 RX ADMIN — ANASTROZOLE 1 MG: 1 TABLET ORAL at 09:04

## 2020-08-06 RX ADMIN — ENOXAPARIN SODIUM 40 MG: 40 INJECTION SUBCUTANEOUS at 09:04

## 2020-08-06 RX ADMIN — NITROGLYCERIN 1 INCH: 20 OINTMENT TOPICAL at 00:05

## 2020-08-06 RX ADMIN — NITROGLYCERIN 1 INCH: 20 OINTMENT TOPICAL at 12:36

## 2020-08-06 RX ADMIN — SODIUM CHLORIDE, PRESERVATIVE FREE 3 ML: 5 INJECTION INTRAVENOUS at 09:10

## 2020-08-06 RX ADMIN — OXYCODONE HYDROCHLORIDE AND ACETAMINOPHEN 1 TABLET: 5; 325 TABLET ORAL at 21:11

## 2020-08-06 RX ADMIN — DOCUSATE SODIUM 100 MG: 100 CAPSULE, LIQUID FILLED ORAL at 21:02

## 2020-08-06 RX ADMIN — OXYCODONE HYDROCHLORIDE AND ACETAMINOPHEN 1 TABLET: 5; 325 TABLET ORAL at 03:57

## 2020-08-06 RX ADMIN — BISACODYL 10 MG: 10 SUPPOSITORY RECTAL at 10:59

## 2020-08-06 RX ADMIN — CYCLOBENZAPRINE 5 MG: 10 TABLET, FILM COATED ORAL at 16:40

## 2020-08-06 RX ADMIN — POLYETHYLENE GLYCOL 3350 17 G: 17 POWDER, FOR SOLUTION ORAL at 09:04

## 2020-08-06 NOTE — PLAN OF CARE
Problem: Patient Care Overview  Goal: Plan of Care Review  Outcome: Ongoing (interventions implemented as appropriate)  Flowsheets (Taken 8/6/2020 9610)  Progress: improving  Plan of Care Reviewed With: patient  Outcome Summary: vss, pain well controlled by po percocet, lt breast tram flap pink warm and soft, nitor paste applied to t incision site last night, with small bleeding on the incision site, tolerating regualr deit, encourage to increase fluid intake and to use icnentive spirometer, voiding freely, bed flexed at all times, continue to monitor the pt.

## 2020-08-06 NOTE — PLAN OF CARE
Problem: Patient Care Overview  Goal: Plan of Care Review  Outcome: Ongoing (interventions implemented as appropriate)  Flowsheets (Taken 8/6/2020 2648)  Progress: improving  Plan of Care Reviewed With: patient; spouse  Outcome Summary: pt's pain is tolerable w percocet and flexeril, amb in leija x1 and sat in chair, showered today and drsgs changed at ANDREI sites, tram flap is warm pink and soft, oozing from the T site and nitro paste ordered q6h, tolerating reg diet, possible d/c home tomorrow

## 2020-08-06 NOTE — PROGRESS NOTES
Assessment/Plan Overall the patient is doing well we will plan to be out of bed more today and a shower today and ambulate we will continue with a once a day Lovenox.  There is an area of concern along with vertical incision it is small there is some venous oozing here and bruising here it may end up ultimately requiring a small revision is difficult to say at this point but I do want to continue nitroglycerin paste to this and a light pad and watch this overnight.  Continues to improve she can discharge home tomorrow and there are no further changes with her TRAM flap overall circulation is good however there is one area of concern we will continue the Nitropaste to this with a small pad and keep her warm.  Shower today    Subjective Postop day #2 from with TRAM flap breast reconstruction, patient is ambulated she is tolerating oral intake however she is does still have some venous oozing and some bruising at the umbilical takeout site of the TRAM flap along the vertical incision the flap is soft there is no evidence of hematoma no evident evidence of collection 99% of the flap is warm and soft and has excellent circulation we are continuing nitroglycerin paste to the skin edge    Temp:  [97 °F (36.1 °C)-99.4 °F (37.4 °C)] 98.8 °F (37.1 °C)  Heart Rate:  [] 79  Resp:  [16] 16  BP: ()/(53-58) 90/53  I/O last 3 completed shifts:  In: 4435 [P.O.:2420; I.V.:2015]  Out: 2885 [Urine:2600; Drains:285]  No intake/output data recorded.    Objective TRAM flap is soft and warm and has excellent circulation however there is some bruising at a vertical incision within the TRAM flap there is a little bit of venous oozing here her calves are soft and nontender the donor site is fine her drains are appropriate no evidence of hematoma or collections      History of breast cancer

## 2020-08-07 VITALS
WEIGHT: 200.3 LBS | HEIGHT: 65 IN | DIASTOLIC BLOOD PRESSURE: 59 MMHG | SYSTOLIC BLOOD PRESSURE: 109 MMHG | HEART RATE: 94 BPM | BODY MASS INDEX: 33.37 KG/M2 | TEMPERATURE: 98.4 F | OXYGEN SATURATION: 96 % | RESPIRATION RATE: 16 BRPM

## 2020-08-07 RX ADMIN — BISACODYL 10 MG: 10 SUPPOSITORY RECTAL at 10:00

## 2020-08-07 RX ADMIN — BACITRACIN ZINC, NEOMYCIN, POLYMYXIN B: 400; 3.5; 5 OINTMENT TOPICAL at 12:02

## 2020-08-07 RX ADMIN — NITROGLYCERIN 1 INCH: 20 OINTMENT TOPICAL at 06:12

## 2020-08-07 RX ADMIN — DOCUSATE SODIUM 100 MG: 100 CAPSULE, LIQUID FILLED ORAL at 08:09

## 2020-08-07 RX ADMIN — OXYCODONE HYDROCHLORIDE AND ACETAMINOPHEN 1 TABLET: 5; 325 TABLET ORAL at 06:12

## 2020-08-07 RX ADMIN — CELECOXIB 400 MG: 200 CAPSULE ORAL at 08:09

## 2020-08-07 RX ADMIN — ANASTROZOLE 1 MG: 1 TABLET ORAL at 08:09

## 2020-08-07 RX ADMIN — Medication 500 MG: at 08:09

## 2020-08-07 RX ADMIN — POLYETHYLENE GLYCOL 3350 17 G: 17 POWDER, FOR SOLUTION ORAL at 08:10

## 2020-08-07 NOTE — SIGNIFICANT NOTE
telfa covering Incisional area on Tram Flap. Nitro applied on small amt of bruising at this inciional area.Rest of flap is warm and pink and blanchable.

## 2020-08-07 NOTE — PROGRESS NOTES
Continued Stay Note  [facility]     Patient Name: Yu Thakur  MRN: [id number]  Today's Date: 8/7/2020    Admit Date: 8/4/2020    Discharge Plan     Row Name 08/07/20 1025       Plan    Plan  Home    Plan Comments  Discharge order noted. Met with patient who confirmed DC plan is home. Stated spouse will assist as needed and provide transportation at DC . Denies any needs/equipment.         Discharge Codes    No documentation.       Expected Discharge Date and Time     Expected Discharge Date Expected Discharge Time    Aug 7, 2020             Kaylan Barraza RN

## 2020-08-07 NOTE — PROGRESS NOTES
Assessment/Plan Patient is doing well postoperative day #3 from left TRAM flap breast reconstruction on a delayed basis.  Plan is for discharge today.  Her restrictions are as noted in my discharge instructions which have been given to the patient and reviewed and scanned into epic.  Her diet is a regular light diet with plenty of liquids.  She is to take lots of over-the-counter stool softeners and laxatives as needed she is to rest and her follow-up appointment will be Monday morning with me in my office she is to wear a mask and call when she reaches the parking lot discharge medications include Percocet Flexeril and Zofran she is to use as little as possible and because of her major surgery she will require greater than 3 days of opioid narcotics.  All precautions regarding COVID-19 were followed and she tested negative preoperatively.  She is to call me come to my office or go to the emergency room should she have any questions concerns or emergencies.    Subjective Patient is postoperative day #3 from a left TRAM flap reconstruction by Dr. Jeremiah Montgomery MD.  Her diagnosis is acquired absence of left breast history of left breast cancer history of left breast chemotherapy and radiation therapy.  Patient has done well postoperatively she is had some skin edge bruising and bruising at the vertical incision of her TRAM flap but overall the TRAM flap is excellent circulation without venous congestion she has had some bleeding from the drain exit site I think this is because she is on Lovenox and we will discontinue the Lovenox upon discharge today and apply pressure dressing here it has stopped at this point.  She is now tolerating oral pain control and oral intake her Terry catheter was removed and her IV has been saline locked.  Nursing staff will review my post TRAM discharge instructions and give her a copy.  She is to rest she is not to do any heavy lifting or straining greater than 5 pounds she is to  sleep in a flexed position and avoid abdominal crunches or activation of her abdominal muscles as much as possible.  I have encouraged her to use her CPAP machine as much as possible and use her incentive spirometer at home as much as possible.  Her discharge medications will include Percocet Flexeril and Zofran she is to use as little as possible of the pain medications because of major surgery she will require greater than 3 days of opioid narcotics.  Temp:  [96.7 °F (35.9 °C)-98.6 °F (37 °C)] 96.7 °F (35.9 °C)  Heart Rate:  [] 65  Resp:  [16] 16  BP: ()/(57-68) 95/60  I/O last 3 completed shifts:  In: 1380 [P.O.:1380]  Out: 4073 [Urine:3850; Drains:223]  No intake/output data recorded.    Objective  No evidence of hematoma drain exit site is not bleeding at this point drains are clear and getting serous in nature calves are soft and nontender TRAM flap is excellent circulation is soft there is 1 area of skin edge bruising she may have some epidermal lysis here but there is no evidence of flap necrosis we will treat this expectantly with observation and ointment.      History of breast cancer

## 2020-08-07 NOTE — PROGRESS NOTES
Case Management Discharge Note      Final Note: Discharged home. Raina Barraza RN    Provided Post Acute Provider List?: N/A(Denies needs. Plan is home)         Transportation Services  Private: Car    Final Discharge Disposition Code: 01 - home or self-care

## 2020-08-07 NOTE — PLAN OF CARE
Problem: Patient Care Overview  Goal: Plan of Care Review  Outcome: Ongoing (interventions implemented as appropriate)  Flowsheets  Taken 8/6/2020 1751 by Pau Jefferson RN  Progress: improving  Taken 8/7/2020 0319 by Sol Fried RN  Plan of Care Reviewed With: patient  Note:   Patient medicated with Percocet for pain.  ANDREI drain x 2, stripped tubing.  Insertion site of the drain has little bit of oozing at the left side,  TRAM flap is warm, soft and pink.  V/S stable, using CPAP at bedtime.

## 2020-08-08 ENCOUNTER — READMISSION MANAGEMENT (OUTPATIENT)
Dept: CALL CENTER | Facility: HOSPITAL | Age: 45
End: 2020-08-08

## 2020-08-08 NOTE — OUTREACH NOTE
Prep Survey      Responses   [facility] facility patient discharged from?  [place]   Is LACE score < 7 ?  No   Eligibility  Readm Mgmt   Discharge diagnosis  TRAM flap breast reconstruction   COVID-19 Test Status  Negative   Does the patient have one of the following disease processes/diagnoses(primary or secondary)?  General Surgery   Does the patient have Home health ordered?  No   Is there a DME ordered?  No   Prep survey completed?  Yes          Vanessa Kearney RN

## 2020-08-20 ENCOUNTER — READMISSION MANAGEMENT (OUTPATIENT)
Dept: CALL CENTER | Facility: HOSPITAL | Age: 45
End: 2020-08-20

## 2020-08-20 NOTE — OUTREACH NOTE
General Surgery Week 2 Survey      Responses   [facility] patient discharged from?  [place]   Does the patient have one of the following disease processes/diagnoses(primary or secondary)?  General Surgery   Week 2 attempt successful?  Yes   Call start time  0927   Call end time  0930   Meds reviewed with patient/caregiver?  Yes   Is the patient taking all medications as directed (includes completed medication regime)?  Yes   Has the patient kept scheduled appointments due by today?  Yes   What is the patient's perception of their health status since discharge?  Improving   Is the patient /caregiver able to teach back basic post-op care?  Continue use of incentive spirometry at least 1 week post discharge   Is the patient/caregiver able to teach back signs and symptoms of incisional infection?  Increased redness, swelling or pain at the incisonal site, Increased drainage or bleeding, Incisional warmth, Pus or odor from incision   Is the patient/caregiver able to teach back steps to recovery at home?  Set small, achievable goals for return to baseline health   Week 2 call completed?  Yes   Wrap up additional comments  Doing well.  Incision looks good.  She has another surgical follow up tomorrow.  Pain is minimal.          Kaylan Martins RN

## 2020-08-21 ENCOUNTER — TRANSCRIBE ORDERS (OUTPATIENT)
Dept: ADMINISTRATIVE | Facility: HOSPITAL | Age: 45
End: 2020-08-21

## 2020-08-21 ENCOUNTER — LAB (OUTPATIENT)
Dept: LAB | Facility: HOSPITAL | Age: 45
End: 2020-08-21

## 2020-08-21 DIAGNOSIS — T79.A3XA: Primary | ICD-10-CM

## 2020-08-21 DIAGNOSIS — T79.A3XA: ICD-10-CM

## 2020-08-21 LAB
ANION GAP SERPL CALCULATED.3IONS-SCNC: 16 MMOL/L (ref 5–15)
BASOPHILS # BLD AUTO: 0.08 10*3/MM3 (ref 0–0.2)
BASOPHILS NFR BLD AUTO: 0.5 % (ref 0–1.5)
BUN SERPL-MCNC: 16 MG/DL (ref 6–20)
BUN/CREAT SERPL: 25 (ref 7–25)
CALCIUM SPEC-SCNC: 9.3 MG/DL (ref 8.6–10.5)
CHLORIDE SERPL-SCNC: 93 MMOL/L (ref 98–107)
CO2 SERPL-SCNC: 25 MMOL/L (ref 22–29)
CREAT SERPL-MCNC: 0.64 MG/DL (ref 0.57–1)
DEPRECATED RDW RBC AUTO: 40.1 FL (ref 37–54)
EOSINOPHIL # BLD AUTO: 0.2 10*3/MM3 (ref 0–0.4)
EOSINOPHIL NFR BLD AUTO: 1.2 % (ref 0.3–6.2)
ERYTHROCYTE [DISTWIDTH] IN BLOOD BY AUTOMATED COUNT: 12.7 % (ref 12.3–15.4)
GFR SERPL CREATININE-BSD FRML MDRD: 99 ML/MIN/1.73
GLUCOSE SERPL-MCNC: 101 MG/DL (ref 65–99)
HCT VFR BLD AUTO: 37.8 % (ref 34–46.6)
HGB BLD-MCNC: 12.2 G/DL (ref 12–15.9)
IMM GRANULOCYTES # BLD AUTO: 0.09 10*3/MM3 (ref 0–0.05)
IMM GRANULOCYTES NFR BLD AUTO: 0.5 % (ref 0–0.5)
LYMPHOCYTES # BLD AUTO: 2.05 10*3/MM3 (ref 0.7–3.1)
LYMPHOCYTES NFR BLD AUTO: 12.2 % (ref 19.6–45.3)
MCH RBC QN AUTO: 27.9 PG (ref 26.6–33)
MCHC RBC AUTO-ENTMCNC: 32.3 G/DL (ref 31.5–35.7)
MCV RBC AUTO: 86.3 FL (ref 79–97)
MONOCYTES # BLD AUTO: 1.22 10*3/MM3 (ref 0.1–0.9)
MONOCYTES NFR BLD AUTO: 7.3 % (ref 5–12)
NEUTROPHILS NFR BLD AUTO: 13.13 10*3/MM3 (ref 1.7–7)
NEUTROPHILS NFR BLD AUTO: 78.3 % (ref 42.7–76)
NRBC BLD AUTO-RTO: 0 /100 WBC (ref 0–0.2)
PLATELET # BLD AUTO: 517 10*3/MM3 (ref 140–450)
PMV BLD AUTO: 9.5 FL (ref 6–12)
POTASSIUM SERPL-SCNC: 4 MMOL/L (ref 3.5–5.2)
RBC # BLD AUTO: 4.38 10*6/MM3 (ref 3.77–5.28)
SODIUM SERPL-SCNC: 134 MMOL/L (ref 136–145)
WBC # BLD AUTO: 16.77 10*3/MM3 (ref 3.4–10.8)

## 2020-08-21 PROCEDURE — C9803 HOPD COVID-19 SPEC COLLECT: HCPCS

## 2020-08-21 PROCEDURE — 85025 COMPLETE CBC W/AUTO DIFF WBC: CPT

## 2020-08-21 PROCEDURE — 36415 COLL VENOUS BLD VENIPUNCTURE: CPT

## 2020-08-21 PROCEDURE — U0004 COV-19 TEST NON-CDC HGH THRU: HCPCS | Performed by: SURGERY

## 2020-08-21 PROCEDURE — 80048 BASIC METABOLIC PNL TOTAL CA: CPT

## 2020-08-23 LAB
REF LAB TEST METHOD: NORMAL
SARS-COV-2 RNA RESP QL NAA+PROBE: NOT DETECTED

## 2020-08-24 ENCOUNTER — ANESTHESIA (OUTPATIENT)
Dept: PERIOP | Facility: HOSPITAL | Age: 45
End: 2020-08-24

## 2020-08-24 ENCOUNTER — ANESTHESIA EVENT (OUTPATIENT)
Dept: PERIOP | Facility: HOSPITAL | Age: 45
End: 2020-08-24

## 2020-08-24 ENCOUNTER — HOSPITAL ENCOUNTER (OUTPATIENT)
Facility: HOSPITAL | Age: 45
Setting detail: HOSPITAL OUTPATIENT SURGERY
Discharge: HOME | End: 2020-08-24
Attending: SURGERY | Admitting: SURGERY

## 2020-08-24 VITALS
BODY MASS INDEX: 32.76 KG/M2 | TEMPERATURE: 98.8 F | RESPIRATION RATE: 16 BRPM | DIASTOLIC BLOOD PRESSURE: 91 MMHG | WEIGHT: 196.65 LBS | OXYGEN SATURATION: 97 % | HEART RATE: 96 BPM | HEIGHT: 65 IN | SYSTOLIC BLOOD PRESSURE: 136 MMHG

## 2020-08-24 DIAGNOSIS — Z85.3 HX: BREAST CANCER: ICD-10-CM

## 2020-08-24 PROCEDURE — 25010000002 PROPOFOL 10 MG/ML EMULSION: Performed by: NURSE ANESTHETIST, CERTIFIED REGISTERED

## 2020-08-24 PROCEDURE — 25010000002 GENTAMICIN PER 80 MG: Performed by: SURGERY

## 2020-08-24 PROCEDURE — 25010000002 CEFTRIAXONE PER 250 MG: Performed by: SURGERY

## 2020-08-24 PROCEDURE — 25010000002 DEXAMETHASONE PER 1 MG: Performed by: SURGERY

## 2020-08-24 PROCEDURE — 25010000002 FENTANYL CITRATE (PF) 100 MCG/2ML SOLUTION: Performed by: NURSE ANESTHETIST, CERTIFIED REGISTERED

## 2020-08-24 PROCEDURE — 88302 TISSUE EXAM BY PATHOLOGIST: CPT | Performed by: SURGERY

## 2020-08-24 PROCEDURE — 25010000002 ONDANSETRON PER 1 MG: Performed by: NURSE ANESTHETIST, CERTIFIED REGISTERED

## 2020-08-24 RX ORDER — LIDOCAINE HYDROCHLORIDE 20 MG/ML
INJECTION, SOLUTION INFILTRATION; PERINEURAL AS NEEDED
Status: DISCONTINUED | OUTPATIENT
Start: 2020-08-24 | End: 2020-08-24 | Stop reason: SURG

## 2020-08-24 RX ORDER — PROMETHAZINE HYDROCHLORIDE 25 MG/1
25 TABLET ORAL ONCE AS NEEDED
Status: DISCONTINUED | OUTPATIENT
Start: 2020-08-24 | End: 2020-08-24 | Stop reason: HOSPADM

## 2020-08-24 RX ORDER — PROMETHAZINE HYDROCHLORIDE 25 MG/ML
12.5 INJECTION, SOLUTION INTRAMUSCULAR; INTRAVENOUS ONCE AS NEEDED
Status: DISCONTINUED | OUTPATIENT
Start: 2020-08-24 | End: 2020-08-24 | Stop reason: HOSPADM

## 2020-08-24 RX ORDER — SODIUM CHLORIDE 0.9 % (FLUSH) 0.9 %
3 SYRINGE (ML) INJECTION EVERY 12 HOURS SCHEDULED
Status: DISCONTINUED | OUTPATIENT
Start: 2020-08-24 | End: 2020-08-24 | Stop reason: HOSPADM

## 2020-08-24 RX ORDER — ONDANSETRON 2 MG/ML
INJECTION INTRAMUSCULAR; INTRAVENOUS AS NEEDED
Status: DISCONTINUED | OUTPATIENT
Start: 2020-08-24 | End: 2020-08-24 | Stop reason: SURG

## 2020-08-24 RX ORDER — HYDRALAZINE HYDROCHLORIDE 20 MG/ML
5 INJECTION INTRAMUSCULAR; INTRAVENOUS
Status: DISCONTINUED | OUTPATIENT
Start: 2020-08-24 | End: 2020-08-24 | Stop reason: HOSPADM

## 2020-08-24 RX ORDER — FENTANYL CITRATE 50 UG/ML
INJECTION, SOLUTION INTRAMUSCULAR; INTRAVENOUS AS NEEDED
Status: DISCONTINUED | OUTPATIENT
Start: 2020-08-24 | End: 2020-08-24 | Stop reason: SURG

## 2020-08-24 RX ORDER — ONDANSETRON 2 MG/ML
4 INJECTION INTRAMUSCULAR; INTRAVENOUS ONCE AS NEEDED
Status: DISCONTINUED | OUTPATIENT
Start: 2020-08-24 | End: 2020-08-24 | Stop reason: HOSPADM

## 2020-08-24 RX ORDER — MAGNESIUM HYDROXIDE 1200 MG/15ML
LIQUID ORAL AS NEEDED
Status: DISCONTINUED | OUTPATIENT
Start: 2020-08-24 | End: 2020-08-24 | Stop reason: HOSPADM

## 2020-08-24 RX ORDER — CELECOXIB 200 MG/1
200 CAPSULE ORAL ONCE
Status: COMPLETED | OUTPATIENT
Start: 2020-08-24 | End: 2020-08-24

## 2020-08-24 RX ORDER — MIDAZOLAM HYDROCHLORIDE 1 MG/ML
1 INJECTION INTRAMUSCULAR; INTRAVENOUS
Status: DISCONTINUED | OUTPATIENT
Start: 2020-08-24 | End: 2020-08-24 | Stop reason: HOSPADM

## 2020-08-24 RX ORDER — PROMETHAZINE HYDROCHLORIDE 25 MG/1
25 SUPPOSITORY RECTAL ONCE AS NEEDED
Status: DISCONTINUED | OUTPATIENT
Start: 2020-08-24 | End: 2020-08-24 | Stop reason: HOSPADM

## 2020-08-24 RX ORDER — FENTANYL CITRATE 50 UG/ML
50 INJECTION, SOLUTION INTRAMUSCULAR; INTRAVENOUS
Status: DISCONTINUED | OUTPATIENT
Start: 2020-08-24 | End: 2020-08-24 | Stop reason: HOSPADM

## 2020-08-24 RX ORDER — DIPHENHYDRAMINE HYDROCHLORIDE 50 MG/ML
12.5 INJECTION INTRAMUSCULAR; INTRAVENOUS
Status: DISCONTINUED | OUTPATIENT
Start: 2020-08-24 | End: 2020-08-24 | Stop reason: HOSPADM

## 2020-08-24 RX ORDER — HYDROCODONE BITARTRATE AND ACETAMINOPHEN 7.5; 325 MG/1; MG/1
1 TABLET ORAL ONCE AS NEEDED
Status: DISCONTINUED | OUTPATIENT
Start: 2020-08-24 | End: 2020-08-24 | Stop reason: HOSPADM

## 2020-08-24 RX ORDER — SCOLOPAMINE TRANSDERMAL SYSTEM 1 MG/1
1 PATCH, EXTENDED RELEASE TRANSDERMAL ONCE
Status: DISCONTINUED | OUTPATIENT
Start: 2020-08-24 | End: 2020-08-24 | Stop reason: HOSPADM

## 2020-08-24 RX ORDER — PROPOFOL 10 MG/ML
VIAL (ML) INTRAVENOUS AS NEEDED
Status: DISCONTINUED | OUTPATIENT
Start: 2020-08-24 | End: 2020-08-24 | Stop reason: SURG

## 2020-08-24 RX ORDER — SODIUM CHLORIDE, SODIUM LACTATE, POTASSIUM CHLORIDE, CALCIUM CHLORIDE 600; 310; 30; 20 MG/100ML; MG/100ML; MG/100ML; MG/100ML
9 INJECTION, SOLUTION INTRAVENOUS CONTINUOUS
Status: DISCONTINUED | OUTPATIENT
Start: 2020-08-24 | End: 2020-08-24 | Stop reason: HOSPADM

## 2020-08-24 RX ORDER — SODIUM CHLORIDE 0.9 % (FLUSH) 0.9 %
3-10 SYRINGE (ML) INJECTION AS NEEDED
Status: DISCONTINUED | OUTPATIENT
Start: 2020-08-24 | End: 2020-08-24 | Stop reason: HOSPADM

## 2020-08-24 RX ORDER — HYDROMORPHONE HYDROCHLORIDE 1 MG/ML
0.5 INJECTION, SOLUTION INTRAMUSCULAR; INTRAVENOUS; SUBCUTANEOUS
Status: DISCONTINUED | OUTPATIENT
Start: 2020-08-24 | End: 2020-08-24 | Stop reason: HOSPADM

## 2020-08-24 RX ORDER — FAMOTIDINE 10 MG/ML
20 INJECTION, SOLUTION INTRAVENOUS ONCE
Status: COMPLETED | OUTPATIENT
Start: 2020-08-24 | End: 2020-08-24

## 2020-08-24 RX ORDER — OXYCODONE AND ACETAMINOPHEN 7.5; 325 MG/1; MG/1
1 TABLET ORAL ONCE AS NEEDED
Status: DISCONTINUED | OUTPATIENT
Start: 2020-08-24 | End: 2020-08-24 | Stop reason: HOSPADM

## 2020-08-24 RX ORDER — CEPHALEXIN 500 MG/1
500 CAPSULE ORAL 4 TIMES DAILY
COMMUNITY
End: 2020-11-20

## 2020-08-24 RX ORDER — HYDROXYZINE PAMOATE 50 MG/1
50 CAPSULE ORAL ONCE
Status: COMPLETED | OUTPATIENT
Start: 2020-08-24 | End: 2020-08-24

## 2020-08-24 RX ORDER — LABETALOL HYDROCHLORIDE 5 MG/ML
5 INJECTION, SOLUTION INTRAVENOUS
Status: DISCONTINUED | OUTPATIENT
Start: 2020-08-24 | End: 2020-08-24 | Stop reason: HOSPADM

## 2020-08-24 RX ORDER — GINSENG 100 MG
CAPSULE ORAL AS NEEDED
Status: DISCONTINUED | OUTPATIENT
Start: 2020-08-24 | End: 2020-08-24 | Stop reason: HOSPADM

## 2020-08-24 RX ORDER — OXYCODONE HYDROCHLORIDE AND ACETAMINOPHEN 5; 325 MG/1; MG/1
2 TABLET ORAL ONCE AS NEEDED
Status: DISCONTINUED | OUTPATIENT
Start: 2020-08-24 | End: 2020-08-24 | Stop reason: HOSPADM

## 2020-08-24 RX ORDER — EPHEDRINE SULFATE 50 MG/ML
5 INJECTION, SOLUTION INTRAVENOUS ONCE AS NEEDED
Status: DISCONTINUED | OUTPATIENT
Start: 2020-08-24 | End: 2020-08-24 | Stop reason: HOSPADM

## 2020-08-24 RX ORDER — DIPHENHYDRAMINE HCL 25 MG
25 CAPSULE ORAL
Status: DISCONTINUED | OUTPATIENT
Start: 2020-08-24 | End: 2020-08-24 | Stop reason: HOSPADM

## 2020-08-24 RX ORDER — FLUMAZENIL 0.1 MG/ML
0.2 INJECTION INTRAVENOUS AS NEEDED
Status: DISCONTINUED | OUTPATIENT
Start: 2020-08-24 | End: 2020-08-24 | Stop reason: HOSPADM

## 2020-08-24 RX ORDER — CYCLOBENZAPRINE HCL 10 MG
10 TABLET ORAL 3 TIMES DAILY PRN
COMMUNITY
End: 2020-11-20

## 2020-08-24 RX ORDER — DEXAMETHASONE SODIUM PHOSPHATE 4 MG/ML
8 INJECTION, SOLUTION INTRA-ARTICULAR; INTRALESIONAL; INTRAMUSCULAR; INTRAVENOUS; SOFT TISSUE ONCE
Status: COMPLETED | OUTPATIENT
Start: 2020-08-24 | End: 2020-08-24

## 2020-08-24 RX ORDER — ONDANSETRON 4 MG/1
4 TABLET, FILM COATED ORAL ONCE AS NEEDED
Status: DISCONTINUED | OUTPATIENT
Start: 2020-08-24 | End: 2020-08-24 | Stop reason: HOSPADM

## 2020-08-24 RX ORDER — PROMETHAZINE HYDROCHLORIDE 25 MG/1
12.5 TABLET ORAL ONCE AS NEEDED
Status: DISCONTINUED | OUTPATIENT
Start: 2020-08-24 | End: 2020-08-24 | Stop reason: HOSPADM

## 2020-08-24 RX ORDER — ACETAMINOPHEN 500 MG
1000 TABLET ORAL EVERY 6 HOURS PRN
COMMUNITY
End: 2020-11-20

## 2020-08-24 RX ORDER — PROMETHAZINE HYDROCHLORIDE 25 MG/ML
6.25 INJECTION, SOLUTION INTRAMUSCULAR; INTRAVENOUS
Status: DISCONTINUED | OUTPATIENT
Start: 2020-08-24 | End: 2020-08-24 | Stop reason: HOSPADM

## 2020-08-24 RX ORDER — CLINDAMYCIN PHOSPHATE 900 MG/50ML
900 INJECTION INTRAVENOUS ONCE
Status: COMPLETED | OUTPATIENT
Start: 2020-08-24 | End: 2020-08-24

## 2020-08-24 RX ORDER — NALOXONE HCL 0.4 MG/ML
0.2 VIAL (ML) INJECTION AS NEEDED
Status: DISCONTINUED | OUTPATIENT
Start: 2020-08-24 | End: 2020-08-24 | Stop reason: HOSPADM

## 2020-08-24 RX ORDER — HYDROMORPHONE HYDROCHLORIDE 1 MG/ML
0.25 INJECTION, SOLUTION INTRAMUSCULAR; INTRAVENOUS; SUBCUTANEOUS
Status: DISCONTINUED | OUTPATIENT
Start: 2020-08-24 | End: 2020-08-24 | Stop reason: HOSPADM

## 2020-08-24 RX ORDER — LIDOCAINE HYDROCHLORIDE 10 MG/ML
0.5 INJECTION, SOLUTION EPIDURAL; INFILTRATION; INTRACAUDAL; PERINEURAL ONCE AS NEEDED
Status: DISCONTINUED | OUTPATIENT
Start: 2020-08-24 | End: 2020-08-24 | Stop reason: HOSPADM

## 2020-08-24 RX ORDER — DOCUSATE SODIUM 100 MG/1
100 CAPSULE, LIQUID FILLED ORAL ONCE
Status: DISCONTINUED | OUTPATIENT
Start: 2020-08-24 | End: 2020-08-24 | Stop reason: HOSPADM

## 2020-08-24 RX ADMIN — SODIUM CHLORIDE, POTASSIUM CHLORIDE, SODIUM LACTATE AND CALCIUM CHLORIDE 9 ML/HR: 600; 310; 30; 20 INJECTION, SOLUTION INTRAVENOUS at 12:51

## 2020-08-24 RX ADMIN — CELECOXIB 200 MG: 200 CAPSULE ORAL at 16:15

## 2020-08-24 RX ADMIN — ONDANSETRON HYDROCHLORIDE 4 MG: 2 SOLUTION INTRAMUSCULAR; INTRAVENOUS at 14:53

## 2020-08-24 RX ADMIN — SODIUM CHLORIDE, PRESERVATIVE FREE 10 ML: 5 INJECTION INTRAVENOUS at 12:51

## 2020-08-24 RX ADMIN — FENTANYL CITRATE 25 MCG: 50 INJECTION INTRAMUSCULAR; INTRAVENOUS at 14:55

## 2020-08-24 RX ADMIN — DEXAMETHASONE SODIUM PHOSPHATE 8 MG: 4 INJECTION, SOLUTION INTRAMUSCULAR; INTRAVENOUS at 14:11

## 2020-08-24 RX ADMIN — LIDOCAINE HYDROCHLORIDE 80 MG: 20 INJECTION, SOLUTION INFILTRATION; PERINEURAL at 14:35

## 2020-08-24 RX ADMIN — HYDROXYZINE PAMOATE 50 MG: 50 CAPSULE ORAL at 14:11

## 2020-08-24 RX ADMIN — SCOPALAMINE 1 PATCH: 1 PATCH, EXTENDED RELEASE TRANSDERMAL at 14:11

## 2020-08-24 RX ADMIN — PROPOFOL 150 MG: 10 INJECTION, EMULSION INTRAVENOUS at 14:35

## 2020-08-24 RX ADMIN — FENTANYL CITRATE 25 MCG: 50 INJECTION INTRAMUSCULAR; INTRAVENOUS at 14:47

## 2020-08-24 RX ADMIN — CLINDAMYCIN IN 5 PERCENT DEXTROSE 900 MG: 18 INJECTION, SOLUTION INTRAVENOUS at 14:39

## 2020-08-24 RX ADMIN — FENTANYL CITRATE 50 MCG: 50 INJECTION INTRAMUSCULAR; INTRAVENOUS at 14:35

## 2020-08-24 RX ADMIN — FAMOTIDINE 20 MG: 10 INJECTION INTRAVENOUS at 12:51

## 2020-08-24 NOTE — ADDENDUM NOTE
Addendum  created 08/24/20 1602 by Nito Manuel MD    Attestation recorded in Intraprocedure, Intraprocedure Attestations filed

## 2020-08-24 NOTE — ANESTHESIA POSTPROCEDURE EVALUATION
Patient: Yu Thakur    Procedure Summary     Date:  08/24/20 Room / Location:  [facility] OR  / [facility] MAIN OR    Anesthesia Start:  1427 Anesthesia Stop:  1538    Procedure:  EXCISION AND REVISION LEFT TRANSVERSES RECTUS ABDOMINIOUS MUSCEL FLAP EDGE NECROSIS (Left Breast) Diagnosis:      Surgeon:  KAUSHAL Montgomery MD Provider:  Nito Manuel MD    Anesthesia Type:  general ASA Status:  3          Anesthesia Type: general    Vitals  Vitals Value Taken Time   /69 8/24/2020  3:50 PM   Temp     Pulse 91 8/24/2020  3:53 PM   Resp     SpO2 100 % 8/24/2020  3:53 PM   Vitals shown include unvalidated device data.        Post Anesthesia Care and Evaluation    Patient location during evaluation: PACU  Patient participation: complete - patient participated  Level of consciousness: awake and alert  Pain management: adequate  Airway patency: patent  Anesthetic complications: No anesthetic complications    Cardiovascular status: acceptable  Respiratory status: acceptable  Hydration status: acceptable    Comments: ---------------------------               08/24/20                      1201         ---------------------------   BP:          112/82         Pulse:         98           Resp:          20           Temp:   36.8 °C (98.2 °F)   SpO2:          96%         ---------------------------

## 2020-08-24 NOTE — ANESTHESIA PREPROCEDURE EVALUATION
Anesthesia Evaluation     Patient summary reviewed and Nursing notes reviewed   NPO Solid Status: > 8 hours  NPO Liquid Status: > 8 hours           Airway   Mallampati: II  TM distance: >3 FB  Neck ROM: full  No difficulty expected  Dental - normal exam     Pulmonary - normal exam   (+) sleep apnea on CPAP,   Cardiovascular - normal exam  Exercise tolerance: good (4-7 METS)    ECG reviewed    (+) hypertension,       Neuro/Psych  (+) numbness, psychiatric history Anxiety and Depression,     GI/Hepatic/Renal/Endo    (+)   diabetes mellitus type 2,     Musculoskeletal     Abdominal    Substance History      OB/GYN          Other   arthritis,    history of cancer                    Anesthesia Plan    ASA 3     general     intravenous induction     Anesthetic plan, all risks, benefits, and alternatives have been provided, discussed and informed consent has been obtained with: patient.    Plan discussed with CRNA.

## 2020-08-24 NOTE — ANESTHESIA PROCEDURE NOTES
Airway  Urgency: elective    Date/Time: 8/24/2020 2:36 PM  Airway not difficult    General Information and Staff    Patient location during procedure: OR  Anesthesiologist: Nito Manuel MD  CRNA: Jennifer Bhatia CRNA    Indications and Patient Condition  Indications for airway management: airway protection    Preoxygenated: yes  MILS maintained throughout  Mask difficulty assessment: 1 - vent by mask    Final Airway Details  Final airway type: supraglottic airway      Successful airway: classic  Size 4    Number of attempts at approach: 1  Assessment: lips, teeth, and gum same as pre-op and atraumatic intubation

## 2020-08-26 LAB
CYTO UR: NORMAL
LAB AP CASE REPORT: NORMAL
LAB AP DIAGNOSIS COMMENT: NORMAL
PATH REPORT.FINAL DX SPEC: NORMAL
PATH REPORT.GROSS SPEC: NORMAL

## 2020-09-03 RX ORDER — ANASTROZOLE 1 MG/1
TABLET ORAL
Qty: 90 TABLET | Refills: 1 | Status: SHIPPED | OUTPATIENT
Start: 2020-09-03 | End: 2021-03-11

## 2020-10-14 ENCOUNTER — APPOINTMENT (OUTPATIENT)
Dept: WOMENS IMAGING | Facility: HOSPITAL | Age: 45
End: 2020-10-14

## 2020-10-14 PROCEDURE — 77067 SCR MAMMO BI INCL CAD: CPT | Performed by: RADIOLOGY

## 2020-10-14 PROCEDURE — 77063 BREAST TOMOSYNTHESIS BI: CPT | Performed by: RADIOLOGY

## 2020-11-18 ENCOUNTER — TRANSCRIBE ORDERS (OUTPATIENT)
Dept: PREADMISSION TESTING | Facility: HOSPITAL | Age: 45
End: 2020-11-18

## 2020-11-18 DIAGNOSIS — Z01.818 OTHER SPECIFIED PRE-OPERATIVE EXAMINATION: Primary | ICD-10-CM

## 2020-11-20 ENCOUNTER — APPOINTMENT (OUTPATIENT)
Dept: PREADMISSION TESTING | Facility: HOSPITAL | Age: 45
End: 2020-11-20

## 2020-11-20 VITALS
DIASTOLIC BLOOD PRESSURE: 71 MMHG | SYSTOLIC BLOOD PRESSURE: 119 MMHG | TEMPERATURE: 98.3 F | OXYGEN SATURATION: 99 % | RESPIRATION RATE: 16 BRPM | BODY MASS INDEX: 32.82 KG/M2 | HEART RATE: 61 BPM | WEIGHT: 197 LBS | HEIGHT: 65 IN

## 2020-11-20 LAB
ALBUMIN SERPL-MCNC: 4.3 G/DL (ref 3.5–5.2)
ALBUMIN/GLOB SERPL: 1.2 G/DL
ALP SERPL-CCNC: 120 U/L (ref 39–117)
ALT SERPL W P-5'-P-CCNC: 25 U/L (ref 1–33)
ANION GAP SERPL CALCULATED.3IONS-SCNC: 8.7 MMOL/L (ref 5–15)
AST SERPL-CCNC: 18 U/L (ref 1–32)
BILIRUB SERPL-MCNC: <0.2 MG/DL (ref 0–1.2)
BUN SERPL-MCNC: 11 MG/DL (ref 6–20)
BUN/CREAT SERPL: 22.4 (ref 7–25)
CALCIUM SPEC-SCNC: 9.4 MG/DL (ref 8.6–10.5)
CHLORIDE SERPL-SCNC: 103 MMOL/L (ref 98–107)
CO2 SERPL-SCNC: 28.3 MMOL/L (ref 22–29)
CREAT SERPL-MCNC: 0.49 MG/DL (ref 0.57–1)
DEPRECATED RDW RBC AUTO: 43.7 FL (ref 37–54)
ERYTHROCYTE [DISTWIDTH] IN BLOOD BY AUTOMATED COUNT: 13.7 % (ref 12.3–15.4)
GFR SERPL CREATININE-BSD FRML MDRD: 135 ML/MIN/1.73
GLOBULIN UR ELPH-MCNC: 3.5 GM/DL
GLUCOSE SERPL-MCNC: 66 MG/DL (ref 65–99)
HCT VFR BLD AUTO: 41 % (ref 34–46.6)
HGB BLD-MCNC: 13.1 G/DL (ref 12–15.9)
MCH RBC QN AUTO: 27.6 PG (ref 26.6–33)
MCHC RBC AUTO-ENTMCNC: 32 G/DL (ref 31.5–35.7)
MCV RBC AUTO: 86.5 FL (ref 79–97)
PLATELET # BLD AUTO: 456 10*3/MM3 (ref 140–450)
PMV BLD AUTO: 10.1 FL (ref 6–12)
POTASSIUM SERPL-SCNC: 3.8 MMOL/L (ref 3.5–5.2)
PROT SERPL-MCNC: 7.8 G/DL (ref 6–8.5)
RBC # BLD AUTO: 4.74 10*6/MM3 (ref 3.77–5.28)
SODIUM SERPL-SCNC: 140 MMOL/L (ref 136–145)
WBC # BLD AUTO: 7.99 10*3/MM3 (ref 3.4–10.8)

## 2020-11-20 PROCEDURE — 85027 COMPLETE CBC AUTOMATED: CPT

## 2020-11-20 PROCEDURE — 80053 COMPREHEN METABOLIC PANEL: CPT

## 2020-11-20 PROCEDURE — 36415 COLL VENOUS BLD VENIPUNCTURE: CPT

## 2020-11-20 NOTE — DISCHARGE INSTRUCTIONS
Take the following medications the morning of surgery:  NONE    PLEASE ARRIVE AT THE HOSPITAL AT 5:15 AM  ON November 24, 2020    General Instructions:  • Do not eat solid food after midnight the night before surgery.  • You may drink clear liquids day of surgery but must stop at least one hour before your hospital arrival time.  • NOTHING TO DRINK AFTER 4:15 AM  • It is beneficial for you to have a clear drink that contains carbohydrates the day of surgery.  We suggest a 12 to 20 ounce bottle of Gatorade or Powerade for non-diabetic patients or a 12 to 20 ounce bottle of G2 or Powerade Zero for diabetic patients. (Pediatric patients, are not advised to drink a 12 to 20 ounce carbohydrate drink)    Clear liquids are liquids you can see through.  Nothing red in color.     Plain water                               Sports drinks  Sodas                                   Gelatin (Jell-O)  Fruit juices without pulp such as white grape juice and apple juice  Popsicles that contain no fruit or yogurt  Tea or coffee (no cream or milk added)  Gatorade / Powerade  G2 / Powerade Zero    • Infants may have breast milk up to four hours before surgery.  • Infants drinking formula may drink formula up to six hours before surgery.   • Patients who avoid smoking, chewing tobacco and alcohol for 4 weeks prior to surgery have a reduced risk of post-operative complications.  Quit smoking as many days before surgery as you can.  • Do not smoke, use chewing tobacco or drink alcohol the day of surgery.   • If applicable bring your C-PAP/ BI-PAP machine.  • Bring any papers given to you in the doctor’s office.  • Wear clean comfortable clothes.  • Do not wear contact lenses, false eyelashes or make-up.  Bring a case for your glasses.   • Bring crutches or walker if applicable.  • Remove all piercings.  Leave jewelry and any other valuables at home.  • Hair extensions with metal clips must be removed prior to surgery.  • The Pre-Admission  Testing nurse will instruct you to bring medications if unable to obtain an accurate list in Pre-Admission Testing.      Preventing a Surgical Site Infection:  • For 2 to 3 days before surgery, avoid shaving with a razor because the razor can irritate skin and make it easier to develop an infection.    • Any areas of open skin can increase the risk of a post-operative wound infection by allowing bacteria to enter and travel throughout the body.  Notify your surgeon if you have any skin wounds / rashes even if it is not near the expected surgical site.  The area will need assessed to determine if surgery should be delayed until it is healed.  • Shower on the morning of surgery using a fresh bar of anti-bacterial soap (such as Dial) and clean washcloth.  Dry with a clean towel and dress in clean clothing.  • Ask your surgeon if you will be receiving antibiotics prior to surgery.  • Make sure you, your family, and all healthcare providers clean their hands with soap and water or an alcohol based hand  before caring for you or your wound.    Day of surgery:  Your arrival time is approximately two hours before your scheduled surgery time.  Upon arrival, a Pre-op nurse and Anesthesiologist will review your health history, obtain vital signs, and answer questions you may have.  The only belongings needed at this time will be a list of your home medications and if applicable your C-PAP/BI-PAP machine.  A Pre-op nurse will start an IV and you may receive medication in preparation for surgery, including something to help you relax.     Please be aware that surgery does come with discomfort.  We want to make every effort to control your discomfort so please discuss any uncontrolled symptoms with your nurse.   Your doctor will most likely have prescribed pain medications.      If you are going home after surgery you will receive individualized written care instructions before being discharged.  A responsible adult must  drive you to and from the hospital on the day of your surgery and stay with you for 24 hours.    If you are staying overnight following surgery, you will be transported to your hospital room following the recovery period.  [facility] has all private rooms.    If you have any questions please call Pre-Admission Testing at [phone].  Deductibles and co-payments are collected on the day of service. Please be prepared to pay the required co-pay, deductible or deposit on the day of service as defined by your plan.    Patient Education for Self-Quarantine Process    Following your COVID testing, we strongly recommend that you do not leave your home after you have been tested for COVID except to get medical care. This includes not going to work, school or to public areas.  If this is not possible for you to do please limit your activities to only required outings.  Be sure to wear a mask when you are with other people, practice social distancing and wash your hands frequently.      The following items provide additional details to keep you safe.  • Wash your hands with soap and water frequently for at least 20 seconds.   • Avoid touching your eyes, nose and mouth with unwashed hands.  • Do not share anything - utensils, towels, food from the same bowl.   • Have your own utensils, drinking glass, dishes, towels and bedding.   • Do not have visitors.   • Do use FaceTime to stay in touch with family and friends.  • You should stay in a specific room away from others if possible.   • Stay at least 6 feet away from others in the home if you cannot have a dedicated room to yourself.   • Do not snuggle with your pet. While the CDC says there is no evidence that pets can spread COVID-19 or be infected from humans, it is probably best to avoid “petting, snuggling, being kissed or licked and sharing food (during self-quarantine)”, according to the CDC.   • Sanitize household surfaces daily. Include all high touch  areas (door handles, light switches, phones, countertops, etc.)  • Do not share a bathroom with others, if possible.   • Wear a mask around others in your home if you are unable to stay in a separate room or 6 feet apart. If  you are unable to wear a mask, have your family member wear a mask if they must be within 6 feet of you.   Call your surgeon immediately if you experience any of the following symptoms:  • Sore Throat  • Shortness of Breath or difficulty breathing  • Cough  • Chills  • Body soreness or muscle pain  • Headache  • Fever  • New loss of taste or smell  • Do not arrive for your surgery ill.  Your procedure will need to be rescheduled to another time.  You will need to call your physician before the day of surgery to avoid any unnecessary exposure to hospital staff as well as other patients.

## 2020-11-21 ENCOUNTER — LAB (OUTPATIENT)
Dept: LAB | Facility: HOSPITAL | Age: 45
End: 2020-11-21

## 2020-11-21 DIAGNOSIS — Z01.818 OTHER SPECIFIED PRE-OPERATIVE EXAMINATION: ICD-10-CM

## 2020-11-21 PROCEDURE — C9803 HOPD COVID-19 SPEC COLLECT: HCPCS

## 2020-11-21 PROCEDURE — U0004 COV-19 TEST NON-CDC HGH THRU: HCPCS

## 2020-11-23 LAB — SARS-COV-2 RNA RESP QL NAA+PROBE: NOT DETECTED

## 2020-11-24 ENCOUNTER — ANESTHESIA (OUTPATIENT)
Dept: PERIOP | Facility: HOSPITAL | Age: 45
End: 2020-11-24

## 2020-11-24 ENCOUNTER — HOSPITAL ENCOUNTER (OUTPATIENT)
Facility: HOSPITAL | Age: 45
Discharge: HOME | End: 2020-11-25
Attending: SURGERY | Admitting: SURGERY

## 2020-11-24 ENCOUNTER — ANESTHESIA EVENT (OUTPATIENT)
Dept: PERIOP | Facility: HOSPITAL | Age: 45
End: 2020-11-24

## 2020-11-24 DIAGNOSIS — N62 MACROMASTIA: ICD-10-CM

## 2020-11-24 PROCEDURE — 25010000002 HYDROMORPHONE PER 4 MG: Performed by: NURSE ANESTHETIST, CERTIFIED REGISTERED

## 2020-11-24 PROCEDURE — 25010000002 ONDANSETRON PER 1 MG: Performed by: NURSE ANESTHETIST, CERTIFIED REGISTERED

## 2020-11-24 PROCEDURE — 25010000002 ROPIVACAINE PER 1 MG: Performed by: SURGERY

## 2020-11-24 PROCEDURE — 25010000002 FENTANYL CITRATE (PF) 100 MCG/2ML SOLUTION: Performed by: NURSE ANESTHETIST, CERTIFIED REGISTERED

## 2020-11-24 PROCEDURE — 25010000002 METHYLPREDNISOLONE PER 125 MG: Performed by: SURGERY

## 2020-11-24 PROCEDURE — 25010000002 NEOSTIGMINE PER 0.5 MG: Performed by: NURSE ANESTHETIST, CERTIFIED REGISTERED

## 2020-11-24 PROCEDURE — 25010000002 PROPOFOL 10 MG/ML EMULSION: Performed by: NURSE ANESTHETIST, CERTIFIED REGISTERED

## 2020-11-24 PROCEDURE — 25010000003 BUPIVACAINE LIPOSOME 1.3 % SUSPENSION: Performed by: SURGERY

## 2020-11-24 PROCEDURE — 88305 TISSUE EXAM BY PATHOLOGIST: CPT | Performed by: SURGERY

## 2020-11-24 PROCEDURE — C9290 INJ, BUPIVACAINE LIPOSOME: HCPCS | Performed by: SURGERY

## 2020-11-24 RX ORDER — GLYCOPYRROLATE 0.2 MG/ML
INJECTION INTRAMUSCULAR; INTRAVENOUS AS NEEDED
Status: DISCONTINUED | OUTPATIENT
Start: 2020-11-24 | End: 2020-11-24 | Stop reason: SURG

## 2020-11-24 RX ORDER — MIDAZOLAM HYDROCHLORIDE 1 MG/ML
1 INJECTION INTRAMUSCULAR; INTRAVENOUS
Status: DISCONTINUED | OUTPATIENT
Start: 2020-11-24 | End: 2020-11-24 | Stop reason: HOSPADM

## 2020-11-24 RX ORDER — HYDROMORPHONE HCL 110MG/55ML
PATIENT CONTROLLED ANALGESIA SYRINGE INTRAVENOUS AS NEEDED
Status: DISCONTINUED | OUTPATIENT
Start: 2020-11-24 | End: 2020-11-24 | Stop reason: SURG

## 2020-11-24 RX ORDER — CYCLOBENZAPRINE HCL 10 MG
5 TABLET ORAL ONCE
Status: COMPLETED | OUTPATIENT
Start: 2020-11-24 | End: 2020-11-24

## 2020-11-24 RX ORDER — OXYCODONE AND ACETAMINOPHEN 7.5; 325 MG/1; MG/1
1 TABLET ORAL ONCE AS NEEDED
Status: DISCONTINUED | OUTPATIENT
Start: 2020-11-24 | End: 2020-11-24 | Stop reason: HOSPADM

## 2020-11-24 RX ORDER — FAMOTIDINE 10 MG/ML
20 INJECTION, SOLUTION INTRAVENOUS ONCE
Status: DISCONTINUED | OUTPATIENT
Start: 2020-11-24 | End: 2020-11-24 | Stop reason: HOSPADM

## 2020-11-24 RX ORDER — PROMETHAZINE HYDROCHLORIDE 25 MG/1
25 SUPPOSITORY RECTAL ONCE AS NEEDED
Status: DISCONTINUED | OUTPATIENT
Start: 2020-11-24 | End: 2020-11-24 | Stop reason: HOSPADM

## 2020-11-24 RX ORDER — FENTANYL CITRATE 50 UG/ML
INJECTION, SOLUTION INTRAMUSCULAR; INTRAVENOUS AS NEEDED
Status: DISCONTINUED | OUTPATIENT
Start: 2020-11-24 | End: 2020-11-24 | Stop reason: SURG

## 2020-11-24 RX ORDER — PROMETHAZINE HYDROCHLORIDE 12.5 MG/1
12.5 SUPPOSITORY RECTAL EVERY 6 HOURS PRN
Status: DISCONTINUED | OUTPATIENT
Start: 2020-11-24 | End: 2020-11-25 | Stop reason: HOSPADM

## 2020-11-24 RX ORDER — HYDROMORPHONE HYDROCHLORIDE 1 MG/ML
0.5 INJECTION, SOLUTION INTRAMUSCULAR; INTRAVENOUS; SUBCUTANEOUS
Status: DISCONTINUED | OUTPATIENT
Start: 2020-11-24 | End: 2020-11-24 | Stop reason: HOSPADM

## 2020-11-24 RX ORDER — PROPOFOL 10 MG/ML
VIAL (ML) INTRAVENOUS AS NEEDED
Status: DISCONTINUED | OUTPATIENT
Start: 2020-11-24 | End: 2020-11-24 | Stop reason: SURG

## 2020-11-24 RX ORDER — DOCUSATE SODIUM 100 MG/1
100 CAPSULE, LIQUID FILLED ORAL 2 TIMES DAILY
Status: DISCONTINUED | OUTPATIENT
Start: 2020-11-24 | End: 2020-11-25 | Stop reason: HOSPADM

## 2020-11-24 RX ORDER — CELECOXIB 200 MG/1
200 CAPSULE ORAL ONCE
Status: COMPLETED | OUTPATIENT
Start: 2020-11-24 | End: 2020-11-24

## 2020-11-24 RX ORDER — SODIUM CHLORIDE 0.9 % (FLUSH) 0.9 %
3 SYRINGE (ML) INJECTION EVERY 12 HOURS SCHEDULED
Status: DISCONTINUED | OUTPATIENT
Start: 2020-11-24 | End: 2020-11-24 | Stop reason: HOSPADM

## 2020-11-24 RX ORDER — NALOXONE HCL 0.4 MG/ML
0.1 VIAL (ML) INJECTION
Status: DISCONTINUED | OUTPATIENT
Start: 2020-11-24 | End: 2020-11-25 | Stop reason: HOSPADM

## 2020-11-24 RX ORDER — SACCHAROMYCES BOULARDII 250 MG
250 CAPSULE ORAL 2 TIMES DAILY
Status: DISCONTINUED | OUTPATIENT
Start: 2020-11-24 | End: 2020-11-25 | Stop reason: HOSPADM

## 2020-11-24 RX ORDER — LABETALOL HYDROCHLORIDE 5 MG/ML
5 INJECTION, SOLUTION INTRAVENOUS
Status: DISCONTINUED | OUTPATIENT
Start: 2020-11-24 | End: 2020-11-24 | Stop reason: HOSPADM

## 2020-11-24 RX ORDER — LIDOCAINE HYDROCHLORIDE 10 MG/ML
0.5 INJECTION, SOLUTION EPIDURAL; INFILTRATION; INTRACAUDAL; PERINEURAL ONCE AS NEEDED
Status: DISCONTINUED | OUTPATIENT
Start: 2020-11-24 | End: 2020-11-24 | Stop reason: HOSPADM

## 2020-11-24 RX ORDER — OXYCODONE AND ACETAMINOPHEN 10; 325 MG/1; MG/1
1 TABLET ORAL EVERY 4 HOURS PRN
Status: DISCONTINUED | OUTPATIENT
Start: 2020-11-24 | End: 2020-11-25 | Stop reason: HOSPADM

## 2020-11-24 RX ORDER — FLUMAZENIL 0.1 MG/ML
0.2 INJECTION INTRAVENOUS AS NEEDED
Status: DISCONTINUED | OUTPATIENT
Start: 2020-11-24 | End: 2020-11-24 | Stop reason: HOSPADM

## 2020-11-24 RX ORDER — LORAZEPAM 1 MG/1
1 TABLET ORAL NIGHTLY
Status: DISCONTINUED | OUTPATIENT
Start: 2020-11-24 | End: 2020-11-25 | Stop reason: HOSPADM

## 2020-11-24 RX ORDER — SODIUM CHLORIDE, SODIUM LACTATE, POTASSIUM CHLORIDE, CALCIUM CHLORIDE 600; 310; 30; 20 MG/100ML; MG/100ML; MG/100ML; MG/100ML
9 INJECTION, SOLUTION INTRAVENOUS CONTINUOUS
Status: DISCONTINUED | OUTPATIENT
Start: 2020-11-24 | End: 2020-11-25 | Stop reason: HOSPADM

## 2020-11-24 RX ORDER — CYCLOBENZAPRINE HCL 10 MG
5 TABLET ORAL 3 TIMES DAILY PRN
Status: DISCONTINUED | OUTPATIENT
Start: 2020-11-24 | End: 2020-11-25 | Stop reason: HOSPADM

## 2020-11-24 RX ORDER — FENTANYL CITRATE 50 UG/ML
50 INJECTION, SOLUTION INTRAMUSCULAR; INTRAVENOUS
Status: DISCONTINUED | OUTPATIENT
Start: 2020-11-24 | End: 2020-11-24 | Stop reason: HOSPADM

## 2020-11-24 RX ORDER — ACETAMINOPHEN 325 MG/1
975 TABLET ORAL ONCE
Status: COMPLETED | OUTPATIENT
Start: 2020-11-24 | End: 2020-11-24

## 2020-11-24 RX ORDER — HYDROXYZINE PAMOATE 50 MG/1
50 CAPSULE ORAL ONCE
Status: COMPLETED | OUTPATIENT
Start: 2020-11-24 | End: 2020-11-24

## 2020-11-24 RX ORDER — SCOLOPAMINE TRANSDERMAL SYSTEM 1 MG/1
1 PATCH, EXTENDED RELEASE TRANSDERMAL ONCE
Status: DISCONTINUED | OUTPATIENT
Start: 2020-11-24 | End: 2020-11-24

## 2020-11-24 RX ORDER — CLINDAMYCIN PHOSPHATE 600 MG/50ML
600 INJECTION INTRAVENOUS EVERY 8 HOURS
Status: COMPLETED | OUTPATIENT
Start: 2020-11-24 | End: 2020-11-24

## 2020-11-24 RX ORDER — CYCLOBENZAPRINE HCL 10 MG
10 TABLET ORAL ONCE
Status: COMPLETED | OUTPATIENT
Start: 2020-11-24 | End: 2020-11-24

## 2020-11-24 RX ORDER — ROCURONIUM BROMIDE 10 MG/ML
INJECTION, SOLUTION INTRAVENOUS AS NEEDED
Status: DISCONTINUED | OUTPATIENT
Start: 2020-11-24 | End: 2020-11-24 | Stop reason: SURG

## 2020-11-24 RX ORDER — PROMETHAZINE HYDROCHLORIDE 12.5 MG/1
12.5 TABLET ORAL EVERY 6 HOURS PRN
Status: DISCONTINUED | OUTPATIENT
Start: 2020-11-24 | End: 2020-11-25 | Stop reason: HOSPADM

## 2020-11-24 RX ORDER — OXYCODONE HYDROCHLORIDE AND ACETAMINOPHEN 5; 325 MG/1; MG/1
1 TABLET ORAL EVERY 4 HOURS PRN
Status: DISCONTINUED | OUTPATIENT
Start: 2020-11-24 | End: 2020-11-25 | Stop reason: HOSPADM

## 2020-11-24 RX ORDER — SODIUM CHLORIDE 0.9 % (FLUSH) 0.9 %
3-10 SYRINGE (ML) INJECTION AS NEEDED
Status: DISCONTINUED | OUTPATIENT
Start: 2020-11-24 | End: 2020-11-24 | Stop reason: HOSPADM

## 2020-11-24 RX ORDER — CELECOXIB 200 MG/1
400 CAPSULE ORAL ONCE
Status: COMPLETED | OUTPATIENT
Start: 2020-11-24 | End: 2020-11-24

## 2020-11-24 RX ORDER — HYDROCODONE BITARTRATE AND ACETAMINOPHEN 7.5; 325 MG/1; MG/1
1 TABLET ORAL ONCE AS NEEDED
Status: DISCONTINUED | OUTPATIENT
Start: 2020-11-24 | End: 2020-11-24 | Stop reason: HOSPADM

## 2020-11-24 RX ORDER — BUPIVACAINE HYDROCHLORIDE AND EPINEPHRINE 5; 5 MG/ML; UG/ML
INJECTION, SOLUTION PERINEURAL AS NEEDED
Status: DISCONTINUED | OUTPATIENT
Start: 2020-11-24 | End: 2020-11-24 | Stop reason: HOSPADM

## 2020-11-24 RX ORDER — ANASTROZOLE 1 MG/1
1 TABLET ORAL DAILY
Status: DISCONTINUED | OUTPATIENT
Start: 2020-11-24 | End: 2020-11-25 | Stop reason: HOSPADM

## 2020-11-24 RX ORDER — NALOXONE HCL 0.4 MG/ML
0.2 VIAL (ML) INJECTION AS NEEDED
Status: DISCONTINUED | OUTPATIENT
Start: 2020-11-24 | End: 2020-11-24 | Stop reason: HOSPADM

## 2020-11-24 RX ORDER — PROMETHAZINE HYDROCHLORIDE 25 MG/1
25 TABLET ORAL ONCE AS NEEDED
Status: DISCONTINUED | OUTPATIENT
Start: 2020-11-24 | End: 2020-11-24 | Stop reason: HOSPADM

## 2020-11-24 RX ORDER — EPHEDRINE SULFATE 50 MG/ML
5 INJECTION, SOLUTION INTRAVENOUS ONCE AS NEEDED
Status: DISCONTINUED | OUTPATIENT
Start: 2020-11-24 | End: 2020-11-24 | Stop reason: HOSPADM

## 2020-11-24 RX ORDER — CLINDAMYCIN PHOSPHATE 900 MG/50ML
900 INJECTION INTRAVENOUS ONCE
Status: COMPLETED | OUTPATIENT
Start: 2020-11-24 | End: 2020-11-24

## 2020-11-24 RX ORDER — DIPHENHYDRAMINE HCL 25 MG
25 CAPSULE ORAL
Status: DISCONTINUED | OUTPATIENT
Start: 2020-11-24 | End: 2020-11-24 | Stop reason: HOSPADM

## 2020-11-24 RX ORDER — DIPHENHYDRAMINE HYDROCHLORIDE 50 MG/ML
12.5 INJECTION INTRAMUSCULAR; INTRAVENOUS
Status: DISCONTINUED | OUTPATIENT
Start: 2020-11-24 | End: 2020-11-24 | Stop reason: HOSPADM

## 2020-11-24 RX ORDER — ONDANSETRON 2 MG/ML
INJECTION INTRAMUSCULAR; INTRAVENOUS AS NEEDED
Status: DISCONTINUED | OUTPATIENT
Start: 2020-11-24 | End: 2020-11-24 | Stop reason: SURG

## 2020-11-24 RX ORDER — CELECOXIB 200 MG/1
400 CAPSULE ORAL DAILY
Status: DISCONTINUED | OUTPATIENT
Start: 2020-11-25 | End: 2020-11-25 | Stop reason: HOSPADM

## 2020-11-24 RX ORDER — LIDOCAINE HYDROCHLORIDE 20 MG/ML
INJECTION, SOLUTION INFILTRATION; PERINEURAL AS NEEDED
Status: DISCONTINUED | OUTPATIENT
Start: 2020-11-24 | End: 2020-11-24 | Stop reason: SURG

## 2020-11-24 RX ORDER — POLYETHYLENE GLYCOL 3350 17 G/17G
17 POWDER, FOR SOLUTION ORAL DAILY
Status: DISCONTINUED | OUTPATIENT
Start: 2020-11-24 | End: 2020-11-25 | Stop reason: HOSPADM

## 2020-11-24 RX ORDER — HYDROMORPHONE HYDROCHLORIDE 1 MG/ML
0.25 INJECTION, SOLUTION INTRAMUSCULAR; INTRAVENOUS; SUBCUTANEOUS
Status: DISCONTINUED | OUTPATIENT
Start: 2020-11-24 | End: 2020-11-25 | Stop reason: HOSPADM

## 2020-11-24 RX ORDER — ONDANSETRON 4 MG/1
4 TABLET, FILM COATED ORAL EVERY 6 HOURS PRN
Status: DISCONTINUED | OUTPATIENT
Start: 2020-11-24 | End: 2020-11-25 | Stop reason: HOSPADM

## 2020-11-24 RX ORDER — ESCITALOPRAM OXALATE 20 MG/1
20 TABLET ORAL NIGHTLY
Status: DISCONTINUED | OUTPATIENT
Start: 2020-11-24 | End: 2020-11-25 | Stop reason: HOSPADM

## 2020-11-24 RX ORDER — ONDANSETRON 2 MG/ML
4 INJECTION INTRAMUSCULAR; INTRAVENOUS ONCE AS NEEDED
Status: DISCONTINUED | OUTPATIENT
Start: 2020-11-24 | End: 2020-11-24 | Stop reason: HOSPADM

## 2020-11-24 RX ORDER — HYDROXYZINE PAMOATE 50 MG/1
50 CAPSULE ORAL 4 TIMES DAILY PRN
Status: DISCONTINUED | OUTPATIENT
Start: 2020-11-24 | End: 2020-11-25 | Stop reason: HOSPADM

## 2020-11-24 RX ORDER — ONDANSETRON 2 MG/ML
4 INJECTION INTRAMUSCULAR; INTRAVENOUS EVERY 6 HOURS PRN
Status: DISCONTINUED | OUTPATIENT
Start: 2020-11-24 | End: 2020-11-25 | Stop reason: HOSPADM

## 2020-11-24 RX ORDER — SODIUM CHLORIDE, SODIUM LACTATE, POTASSIUM CHLORIDE, CALCIUM CHLORIDE 600; 310; 30; 20 MG/100ML; MG/100ML; MG/100ML; MG/100ML
100 INJECTION, SOLUTION INTRAVENOUS CONTINUOUS
Status: DISCONTINUED | OUTPATIENT
Start: 2020-11-24 | End: 2020-11-25 | Stop reason: HOSPADM

## 2020-11-24 RX ADMIN — ROCURONIUM BROMIDE 30 MG: 10 INJECTION INTRAVENOUS at 07:02

## 2020-11-24 RX ADMIN — FENTANYL CITRATE 50 MCG: 50 INJECTION INTRAMUSCULAR; INTRAVENOUS at 07:19

## 2020-11-24 RX ADMIN — HYDROMORPHONE HYDROCHLORIDE 0.25 MG: 2 INJECTION, SOLUTION INTRAMUSCULAR; INTRAVENOUS; SUBCUTANEOUS at 07:59

## 2020-11-24 RX ADMIN — CYCLOBENZAPRINE 10 MG: 10 TABLET, FILM COATED ORAL at 06:02

## 2020-11-24 RX ADMIN — CLINDAMYCIN PHOSPHATE 900 MG: 18 INJECTION, SOLUTION INTRAMUSCULAR; INTRAVENOUS at 07:08

## 2020-11-24 RX ADMIN — NITROGLYCERIN 1 INCH: 20 OINTMENT TOPICAL at 19:13

## 2020-11-24 RX ADMIN — OXYCODONE HYDROCHLORIDE AND ACETAMINOPHEN 1 TABLET: 10; 325 TABLET ORAL at 14:04

## 2020-11-24 RX ADMIN — SODIUM CHLORIDE, POTASSIUM CHLORIDE, SODIUM LACTATE AND CALCIUM CHLORIDE 9 ML/HR: 600; 310; 30; 20 INJECTION, SOLUTION INTRAVENOUS at 06:48

## 2020-11-24 RX ADMIN — CYCLOBENZAPRINE 5 MG: 10 TABLET, FILM COATED ORAL at 23:48

## 2020-11-24 RX ADMIN — CELECOXIB 200 MG: 200 CAPSULE ORAL at 10:44

## 2020-11-24 RX ADMIN — HYDROXYZINE PAMOATE 50 MG: 50 CAPSULE ORAL at 06:02

## 2020-11-24 RX ADMIN — FENTANYL CITRATE 50 MCG: 50 INJECTION INTRAMUSCULAR; INTRAVENOUS at 07:01

## 2020-11-24 RX ADMIN — HYDROMORPHONE HYDROCHLORIDE 0.25 MG: 2 INJECTION, SOLUTION INTRAMUSCULAR; INTRAVENOUS; SUBCUTANEOUS at 08:40

## 2020-11-24 RX ADMIN — Medication 250 MG: at 20:23

## 2020-11-24 RX ADMIN — SODIUM CHLORIDE, POTASSIUM CHLORIDE, SODIUM LACTATE AND CALCIUM CHLORIDE 100 ML/HR: 600; 310; 30; 20 INJECTION, SOLUTION INTRAVENOUS at 19:17

## 2020-11-24 RX ADMIN — SCOPALAMINE 1 PATCH: 1 PATCH, EXTENDED RELEASE TRANSDERMAL at 06:01

## 2020-11-24 RX ADMIN — CYCLOBENZAPRINE 5 MG: 10 TABLET, FILM COATED ORAL at 10:44

## 2020-11-24 RX ADMIN — NITROGLYCERIN 1 INCH: 20 OINTMENT TOPICAL at 23:47

## 2020-11-24 RX ADMIN — PROPOFOL 200 MG: 10 INJECTION, EMULSION INTRAVENOUS at 07:01

## 2020-11-24 RX ADMIN — HYDROMORPHONE HYDROCHLORIDE 0.25 MG: 2 INJECTION, SOLUTION INTRAMUSCULAR; INTRAVENOUS; SUBCUTANEOUS at 08:09

## 2020-11-24 RX ADMIN — SODIUM CHLORIDE, POTASSIUM CHLORIDE, SODIUM LACTATE AND CALCIUM CHLORIDE: 600; 310; 30; 20 INJECTION, SOLUTION INTRAVENOUS at 07:59

## 2020-11-24 RX ADMIN — DOCUSATE SODIUM 100 MG: 100 CAPSULE, LIQUID FILLED ORAL at 14:04

## 2020-11-24 RX ADMIN — ANASTROZOLE 1 MG: 1 TABLET ORAL at 19:18

## 2020-11-24 RX ADMIN — GLYCOPYRROLATE 0.3 MG: 0.2 INJECTION INTRAMUSCULAR; INTRAVENOUS at 08:30

## 2020-11-24 RX ADMIN — OXYCODONE HYDROCHLORIDE AND ACETAMINOPHEN 1 TABLET: 5; 325 TABLET ORAL at 23:48

## 2020-11-24 RX ADMIN — LORAZEPAM 1 MG: 1 TABLET ORAL at 20:23

## 2020-11-24 RX ADMIN — CELECOXIB 400 MG: 200 CAPSULE ORAL at 06:02

## 2020-11-24 RX ADMIN — NITROGLYCERIN 1 INCH: 20 OINTMENT TOPICAL at 14:04

## 2020-11-24 RX ADMIN — ESCITALOPRAM 20 MG: 20 TABLET, FILM COATED ORAL at 20:22

## 2020-11-24 RX ADMIN — HYDROMORPHONE HYDROCHLORIDE 0.25 MG: 2 INJECTION, SOLUTION INTRAMUSCULAR; INTRAVENOUS; SUBCUTANEOUS at 08:02

## 2020-11-24 RX ADMIN — ACETAMINOPHEN 975 MG: 325 TABLET, FILM COATED ORAL at 06:02

## 2020-11-24 RX ADMIN — CLINDAMYCIN PHOSPHATE 600 MG: 600 INJECTION, SOLUTION INTRAVENOUS at 14:06

## 2020-11-24 RX ADMIN — SODIUM CHLORIDE 250 MG: 9 INJECTION, SOLUTION INTRAVENOUS at 06:24

## 2020-11-24 RX ADMIN — LIDOCAINE HYDROCHLORIDE 100 MG: 20 INJECTION, SOLUTION INFILTRATION; PERINEURAL at 07:01

## 2020-11-24 RX ADMIN — DOCUSATE SODIUM 100 MG: 100 CAPSULE, LIQUID FILLED ORAL at 20:23

## 2020-11-24 RX ADMIN — ONDANSETRON HYDROCHLORIDE 4 MG: 2 SOLUTION INTRAMUSCULAR; INTRAVENOUS at 08:30

## 2020-11-24 RX ADMIN — NEOSTIGMINE METHYLSULFATE 3 MG: 1 INJECTION INTRAMUSCULAR; INTRAVENOUS; SUBCUTANEOUS at 08:30

## 2020-11-24 NOTE — ANESTHESIA POSTPROCEDURE EVALUATION
"Patient: Yu Thakur    Procedure Summary     Date: 11/24/20 Room / Location: [facility] OR [address] [place] [address] MAIN OR    Anesthesia Start: 0653 Anesthesia Stop: 0919    Procedure: RIGHT BREAST  REDUCTION FOR SYMMENTRY (Right Chest) Diagnosis:     Surgeon: KAUSHAL Montgomery MD Provider: Tono Estevez MD    Anesthesia Type: general ASA Status: 3          Anesthesia Type: general    Vitals  Vitals Value Taken Time   /80 11/24/20 1035   Temp 36.7 °C (98 °F) 11/24/20 1030   Pulse 92 11/24/20 1049   Resp 16 11/24/20 1020   SpO2 99 % 11/24/20 1049   Vitals shown include unvalidated device data.        Post Anesthesia Care and Evaluation    Patient location during evaluation: PACU  Patient participation: complete - patient participated  Level of consciousness: awake  Pain management: adequate  Airway patency: patent  Anesthetic complications: No anesthetic complications    Cardiovascular status: acceptable  Respiratory status: acceptable  Hydration status: acceptable    Comments: /68   Pulse 83   Temp 36.7 °C (98 °F) (Oral)   Resp 16   Ht 165.1 cm (65\")   Wt 90.3 kg (199 lb)   LMP 05/26/2018   SpO2 97%   BMI 33.12 kg/m²         "

## 2020-11-24 NOTE — ANESTHESIA PROCEDURE NOTES
Airway  Urgency: elective    Date/Time: 11/24/2020 7:05 AM  Airway not difficult    General Information and Staff    Patient location during procedure: OR  Anesthesiologist: Tono Estevez MD  CRNA: Farzaneh Restrepo CRNA    Indications and Patient Condition  Indications for airway management: airway protection    Preoxygenated: yes  MILS maintained throughout  Mask difficulty assessment: 2 - vent by mask + OA or adjuvant +/- NMBA    Final Airway Details  Final airway type: endotracheal airway      Successful airway: ETT  Cuffed: yes   Successful intubation technique: direct laryngoscopy  Facilitating devices/methods: cricoid pressure  Endotracheal tube insertion site: oral  Blade: Jessy  Blade size: 3  ETT size (mm): 7.0  Cormack-Lehane Classification: grade IIb - view of arytenoids or posterior of glottis only  Placement verified by: chest auscultation and capnometry   Cuff volume (mL): 8  Measured from: teeth  ETT/EBT  to teeth (cm): 21  Number of attempts at approach: 1  Assessment: lips, teeth, and gum same as pre-op and atraumatic intubation

## 2020-11-24 NOTE — ANESTHESIA PREPROCEDURE EVALUATION
Anesthesia Evaluation     Patient summary reviewed and Nursing notes reviewed   NPO Solid Status: > 8 hours  NPO Liquid Status: > 8 hours           Airway   Mallampati: II  TM distance: >3 FB  Neck ROM: full  Comment: Cormack-Lehane Classification: grade I - full view of glottis  Dental - normal exam     Pulmonary - normal exam    breath sounds clear to auscultation  (+) sleep apnea on CPAP,   Cardiovascular - normal exam  Exercise tolerance: good (4-7 METS)    ECG reviewed  Rhythm: regular  Rate: normal    (+) hypertension,   (-) angina, orthopnea, PND, WONG    ROS comment: Sinus rhythm  Borderline left axis deviation  Anteroseptal infarct, age indeterminate  No change from prior tracing    Neuro/Psych  (+) numbness (H/O Grade 1 peripheral neuropathy), psychiatric history Anxiety and Depression,     GI/Hepatic/Renal/Endo    (+) obesity,   diabetes mellitus type 2,     Musculoskeletal     Abdominal    Substance History - negative use     OB/GYN negative ob/gyn ROS         Other   arthritis, blood dyscrasia anemia,   history of cancer                      Anesthesia Plan    ASA 3     general   (I have reviewed the patient's history with the patient and the chart, including all pertinent laboratory results and imaging. I have explained the risks of anesthesia including but not limited to dental damage, corneal abrasion, nerve injury, MI, stroke, and death.)  intravenous induction     Anesthetic plan, all risks, benefits, and alternatives have been provided, discussed and informed consent has been obtained with: patient.    Plan discussed with CRNA.

## 2020-11-25 VITALS
RESPIRATION RATE: 16 BRPM | WEIGHT: 199 LBS | TEMPERATURE: 99.1 F | OXYGEN SATURATION: 95 % | HEART RATE: 118 BPM | HEIGHT: 65 IN | BODY MASS INDEX: 33.15 KG/M2 | SYSTOLIC BLOOD PRESSURE: 130 MMHG | DIASTOLIC BLOOD PRESSURE: 81 MMHG

## 2020-11-25 LAB
LAB AP CASE REPORT: NORMAL
PATH REPORT.FINAL DX SPEC: NORMAL
PATH REPORT.GROSS SPEC: NORMAL

## 2020-11-25 RX ADMIN — DOCUSATE SODIUM 100 MG: 100 CAPSULE, LIQUID FILLED ORAL at 09:03

## 2020-11-25 RX ADMIN — Medication 250 MG: at 09:02

## 2020-11-25 RX ADMIN — ANASTROZOLE 1 MG: 1 TABLET ORAL at 09:03

## 2020-11-25 RX ADMIN — POLYETHYLENE GLYCOL 3350 17 G: 17 POWDER, FOR SOLUTION ORAL at 09:03

## 2020-11-25 RX ADMIN — CELECOXIB 400 MG: 200 CAPSULE ORAL at 09:02

## 2020-11-25 RX ADMIN — NITROGLYCERIN 1 INCH: 20 OINTMENT TOPICAL at 05:57

## 2020-11-25 RX ADMIN — OXYCODONE HYDROCHLORIDE AND ACETAMINOPHEN 1 TABLET: 5; 325 TABLET ORAL at 06:05

## 2020-12-31 ENCOUNTER — HOSPITAL ENCOUNTER (OUTPATIENT)
Dept: BONE DENSITY | Facility: HOSPITAL | Age: 45
Discharge: HOME | End: 2020-12-31
Admitting: INTERNAL MEDICINE

## 2020-12-31 DIAGNOSIS — C50.812 MALIGNANT NEOPLASM OF OVERLAPPING SITES OF LEFT BREAST IN FEMALE, ESTROGEN RECEPTOR POSITIVE (HCC): ICD-10-CM

## 2020-12-31 DIAGNOSIS — M85.89 OSTEOPENIA OF MULTIPLE SITES: ICD-10-CM

## 2020-12-31 DIAGNOSIS — Z17.0 MALIGNANT NEOPLASM OF OVERLAPPING SITES OF LEFT BREAST IN FEMALE, ESTROGEN RECEPTOR POSITIVE (HCC): ICD-10-CM

## 2020-12-31 PROCEDURE — 77080 DXA BONE DENSITY AXIAL: CPT

## 2021-01-07 NOTE — PROGRESS NOTES
Chief Complaint  Stage IIA ([id number]) left breast cancer ER/SC positive, HER-2/jodi negative.  Recurrent iron deficiency, osteopenia, hepatic steatosis, left upper extremity lymphedema    Subjective        History of Present Illness  Patient returns today for a follow-up visit continuing on adjuvant Arimidex 1 mg daily with plans to complete 10 years of treatment on 9/25/2013 due to her high risk disease.  She also continues on every 6-month Prolia injections, due today.  She has DEXA scan to review at a 1 year interval.  She did undergo annual right-sided mammogram on 10/14/2020 which was negative, BI-RADS 1.  Patient does continue on a multivitamin that contains some B12 and iron.  She has been intolerant of oral iron in the past and has required IV iron.  Patient has undergone previous gastric bypass and did lose a significant amount of weight, has stabilized around 200 pounds more recently.  She was able to undergo reconstructive therapy in late 2020.  Dr. Montgomery performed a left TRAM reconstruction on 8/4/2020, pathology from specimen was negative for malignancy.  She had difficulty with necrosis and underwent revision procedure on 8/24/2020, again with pathology negative for malignancy but showing evidence of inflammation and fat necrosis.  She underwent subsequent right reduction mammoplasty for symmetry on 11/24/2020.  Pathology from the right breast was negative for malignancy.  She has recovered well from her procedures.  She is due for a procedure for nipple placement on the left next week.  She has no new complaints today.  She has not noted any evidence of GI blood loss.  She does note some recent pain in her left ear as well as around her right submental area.  She also notes a darkened area around her right thumb nailbed, is not aware of any significant injury to the area.      Objective   Vital Signs:   /78   Pulse 70   Temp 98 °F (36.7 °C)   Resp 16   Wt 91.4 kg (201 lb 8 oz)   SpO2 98%    BMI 33.53 kg/m²     Physical Exam  Constitutional:       Appearance: She is well-developed.   HENT:      Left Ear: Tympanic membrane normal.   Eyes:      Conjunctiva/sclera: Conjunctivae normal.   Neck:      Thyroid: No thyromegaly.   Cardiovascular:      Rate and Rhythm: Normal rate and regular rhythm.      Heart sounds: No murmur. No friction rub. No gallop.    Pulmonary:      Effort: No respiratory distress.      Breath sounds: Normal breath sounds.      Comments: Status post right reduction mammoplasty with healed incisions.  Status post left TRAM reconstruction with healed incisions.  No masses or abnormalities palpated bilaterally.  Abdominal:      General: Bowel sounds are normal. There is no distension.      Palpations: Abdomen is soft.      Tenderness: There is no abdominal tenderness.   Lymphadenopathy:      Head:      Right side of head: No submandibular adenopathy.      Cervical: No cervical adenopathy.      Upper Body:      Right upper body: No supraclavicular adenopathy.      Left upper body: No supraclavicular adenopathy.   Skin:     General: Skin is warm and dry.      Findings: No rash.      Comments: On the right thumb there is a darkened circular area in the subungual region.  This does not have the appearance of a hematoma nor of onychomycosis.   Neurological:      Mental Status: She is alert and oriented to person, place, and time.      Cranial Nerves: No cranial nerve deficit.      Motor: No abnormal muscle tone.      Deep Tendon Reflexes: Reflexes normal.   Psychiatric:         Behavior: Behavior normal.        Result Review : Reviewed CBC, CMP, iron panel, ferritin, B12, magnesium, phosphorus from today.  Reviewed operative notes and discharge summaries from hospitalizations or surgery on 8/4/2020, 8/20/2020, 11/24/2020 as outlined above and below.  Reviewed mammogram 10/14/2020 as outlined above and below.       Assessment and Plan  1. Stage IIA ([id number]) left breast cancer:      · Status post left mastectomy with left axillary lymph node dissection 03/12/2013.  Lesion #1 invasive ductal carcinoma grade 2, 1.5 cm ER positive (90%), AR positive (1-4%), HER-2/jodi FISH negative (ratio 1.4),  Ki-67 score of 8%.  Lesion #2 invasive ductal carcinoma, grade 2, 1.7 cm,  ER positive (70%), AR negative (%),HER-2/jodi FISH positive (ratio of 3.0).  3 of 15 lymph nodes positive (2 macrometastases, 1 micrometastasis, largest lesion 2.5 cm replacing lymph node).  Lymph node evaluated and was ER positive (90%), AR positive (30%), HER-2/jodi FISH negative (ratio of 1.4).   · She received adjuvant dose-dense chemotherapy with AC x4 cycles followed by weekly Taxol x12 doses completed on 08/27/2013. She did receive Herceptin in conjunction with the Taxol and then completed a total of a year with last dose of Herceptin received on 05/21/2014 (3-week dose).   · She did initiate adjuvant tamoxifen on 09/25/2013 and we anticipate likely a 10-year course of treatment.   · BRCA-1 and 2 testing negative at time of diagnosis.  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  · Due to continued menorrhagia she underwent KATE/BSO on 6/5/18 with benign findings.    · DEXA scan 10/12/18 with evidence of osteopenia with maximal T score -1.4 the lumbar spine.    · Since the patient was rendered surgically postmenopausal with only mild osteopenia, she was transitioned from tamoxifen to adjuvant Arimidex on 11/21/18 with intent to complete 10 years of total adjuvant endocrine therapy (from time of initiation of tamoxifen 9/25/13).  · The patient was able to undergo reconstructive therapy in late 2020.  Dr. Montgomery performed a left TRAM reconstruction on 8/4/2020, pathology from specimen was negative for malignancy.  She had difficulty with necrosis and underwent revision procedure on 8/24/2020, again with pathology negative for malignancy but showing evidence of inflammation and fat necrosis.  She underwent  subsequent right reduction mammoplasty for symmetry on 11/24/2020.  Pathology from the right breast was negative for malignancy.  · The patient returns today in follow-up continuing on adjuvant Arimidex 1 mg daily.  She has no clinical evidence of recurrent disease.  She did undergo annual right-sided mammogram on 10/14/2020 (prior to undergoing reduction right mammoplasty on 11/20/2020).  She has undergone left TRAM reconstruction and now right reduction mammoplasty with complete healing of her surgical incisions at this point.  Her exam today is negative.  She has further follow-up with plastic surgery next week for nipple placement on the left side.  She will return here in 6 months for further routine follow-up.  2. Depression/anxiety with prior psychotic episode:   · The patient required hospitalization in the behavioral health unit in October 2015.   · She has had difficulties with anxiety and depression in the past, however, given her breast cancer diagnosis as well as stressors from her job and family, it appears that these precipitated the event.    · She has stabilized and continues on Lexapro at 20 mg per day.   3. Rheumatoid arthritis:   · The patient was experiencing diffuse arthralgias as well as pain in her wrists right greater than left.    · We did perform an evaluation 4/28/17 with negative MARYSE panel however rheumatoid factor was positive at 16.4.    · She was referred to rheumatology Dr. Lopez in May 2017.   · The patient was felt to have early autoimmune disorder/rheumatoid arthritis.    · She was started on Plaquenil.    · Symptoms have stabilized and improved over time.    · She does continue routine follow-up with rheumatology.  4. Vitamin D deficiency:   · The patient has been on and off of vitamin D supplementation in the past.    · Borderline low 25 hydroxy vitamin D level on 5/14/2019 at 28.3 and was taking vitamin D 400 units over-the-counter daily, subsequently increased dose to 800  units daily.    · On 8/6/2019, 25 hydroxy vitamin D level had improved to 34.8.  5. Anemia:   · The patient developed significant anemia with a hemoglobin down to 10.8 on 3/30/16.    · Evaluation revealed iron deficiency with a ferritin of 12.5 iron percent saturation of 5 and a TIBC 535.    · She started oral iron sulfate 325 mg twice a day and was tolerating this very well.  Her hemoglobin improved into the 12 range.    · Her iron studies showed a significant improvement previously in October 2016 with ferritin up to 54, iron saturation 16%.    · She was asked to continue on oral iron twice a day however she did not do so.    · The patient also was found to have B12 deficiency with a level of 187 on 3/30/16.  She started B12 replacement and received weekly dosing ×4 followed bimonthly injections.  As it was difficult for her to come in to the office for B12 injections, she was asked to begin oral B12 1000 µg daily however she did not do this.    · On 4/28/17, she was mildly anemic with a hemoglobin of 11.4.  She had a thrombocytosis as well, related to iron deficiency.  Repeat iron studies 4/28/17 showed a ferritin that declined to 42, iron saturation low at 7%, elevated TIBC at 491. She was asked to resume oral iron twice daily.  B12 level 4/28/17 had remained in the low normal range at 370 and was monitored.    · On 7/14/17, B12 level was again low normal at 299 if she was asked to resume oral B12 however she did not do so.    · She was intolerant of oral iron with diarrhea and abdominal cramping.    · Labs on 11/8/17 showed a declining iron saturation 9% and a slight decline in ferritin down to 39 with a TIBC at the 501.  Her hemoglobin was up to 12.8.  Oral iron was discontinued 11/8/17 and she proceeded on with Feraheme on 11/15/17 and 11/22/17.  She also resumed oral B12 daily.    · The patient had recurrent menorrhagia but underwent KATE/BSO on 6/5/18.    · B12 level on 5/14/2019 was 1053.    · On 1/17/2020,  hemoglobin declined slightly to 11.7.  We did repeat her iron studies showing ferritin of 116, iron 27, iron saturation 8%, TIBC 325.  Her anemia may be related to recent gastric bypass procedure.   · Patient returns today with labs showing hemoglobin of 12.9.  She is continuing on a multivitamin that contains some amount of iron and B12.  Labs today however show iron 51, ferritin 37.8, iron saturation 11%, TIBC 469, B12 799.  There has been gradual decline in her ferritin level and now on her iron saturation.  She is intolerant of oral iron and would likely not absorb the iron at this point due to her gastric bypass.  Therefore I have recommended that we proceed with additional IV iron in the form of Injectafer and the patient agrees.  She will return in 1 week and again in 2 weeks for Injectafer 750 mg IV.  We will recheck iron studies and recheck B12 level in 6 months when she returns.  Recurrent iron deficiency felt to be related to malabsorption with no clinical evidence of GI blood loss.  6. Elevated transaminases with evidence of hepatic steatosis:   · The patient has had intermittent mild elevation in transaminases.    · Viral hepatitis panel -5/14/2019.    · Hepatic ultrasound 5/28/2019 with borderline hepatomegaly with probable mild fatty infiltration with increased echotexture.    · Transaminases today are normal.  7. Left upper extremity lymphedema:   · The patient does wear her sleeve intermittently, is not wearing it today.    · Lymphedema is stable to improved.  8. Osteopenia:   · Previous DEXA scan 3/5/14 osteopenia with maximal T score of -1.1 in the lumbar spine.    · Surgically postmenopausal.    · Repeat DEXA scan in 10/12/18 with maximal T score -1.4 lumbar spine showing worsening since 2014.    · Aromatase inhibitor therapy initiated with Arimidex 1 mg daily on 11/20/18.    · One year follow-up DEXA scan 10/18/2019 showed a 5% improvement in lumbar spine with T score 0.1, 23% decrease right  femoral neck with T score -2.1, 18% improvement in left femoral neck with T score -0.3.  With decline in bone density in the right femoral neck approaching osteoporosis, recommended that we pursue treatment at this point as she continues on aromatase inhibitor therapy.  She is not a good candidate for oral Fosamax given her difficulties with gastritis and ulcer as well as gastric bypass.  Therefore initiated Prolia on 10/31/2019.    · The patient returns today in follow-up due for Prolia.  We did review her 1 year interval follow-up DEXA scan from 12/31/2020.  This shows evidence of osteopenia with T score lumbar spine -0.5, left hip -0.6, and right hip -1.5.  The maximal T score in the right hip is improved compared to value from 2019 at which time it was -2.1.  This is a discrepancy in regards to the impression which notes worsening bone density.  In fact this represents an improvement.  I have placed a call to Dr. Guillen who was not available today but will hopefully be able to return the call early next week and potentially issue an addendum report assuming the numeric values are correct.  I did recommend that we continue on with Prolia today and again in 6 months when she returns.  Consider repeat DEXA scan at a 1 to 2-year interval.  If there is further improvement in the bone density we may discuss whether Prolia should be continued.  9. Hypercalcemia:   · The patient had hypercalcemia on 5/14/2019 with calcium 10.8, albumin 4.6.  Intact PTH was suppressed at 12.8.  Suspected to be related to exogenous calcium supplementation.   · Patient currently continues on calcium 1200 mg daily with calcium of 9.7 today.  10. Obesity:   · The patient is status post gastric bypass at [facility] on 11/13/2019.    · The patient today has lost significant weight down from 296 and having plateaued around 200 pounds currently.  11. Reported gastritis/gastric ulcer:   · EGD performed prior to gastric bypass  showed evidence of ulcer and gastritis.    · She was treated with Carafate and omeprazole with follow-up EGD on 10/14/2019 that showed improvement but did also show a hiatal hernia.   12. Right thumb subungual lesion:  · Patient with circular right thumb subungual lesion which occurred recently with no specific injury.  This does not necessarily have the appearance of a hematoma or onychomycosis.  I did suggest that we refer her to dermatology to assess the area and make sure that this does not represent subungual melanoma.     PLAN:     1. Proceed with Prolia today  2. Continue adjuvant Arimidex 1 mg daily  3. Continue calcium 1200 mg and vitamin D 800 units daily supplementation  4. Continue multivitamin daily which contains 500 mcg B12 as well as iron  5. Return in 1 week and again in 2 weeks for Injectafer 750 mg IV  6. Referral to dermatology to evaluate right thumb subungual lesion  7. MD visit in 6 months with CBC, CMP, magnesium, phosphorus, iron panel, ferritin, B12 level.  Patient will be due for Prolia.          I spent 40 minutes caring for Yu on this date of service. This time includes time spent by me in the following activities:preparing for the visit, reviewing tests, performing a medically appropriate examination and/or evaluation , counseling and educating the patient/family/caregiver, ordering medications, tests, or procedures, documenting information in the medical record, independently interpreting results and communicating that information with the patient/family/caregiver and care coordination

## 2021-01-08 ENCOUNTER — LAB (OUTPATIENT)
Dept: LAB | Facility: HOSPITAL | Age: 46
End: 2021-01-08

## 2021-01-08 ENCOUNTER — INFUSION (OUTPATIENT)
Dept: ONCOLOGY | Facility: HOSPITAL | Age: 46
End: 2021-01-08

## 2021-01-08 ENCOUNTER — OFFICE VISIT (OUTPATIENT)
Dept: ONCOLOGY | Facility: CLINIC | Age: 46
End: 2021-01-08

## 2021-01-08 VITALS
HEART RATE: 70 BPM | BODY MASS INDEX: 33.53 KG/M2 | RESPIRATION RATE: 16 BRPM | DIASTOLIC BLOOD PRESSURE: 78 MMHG | OXYGEN SATURATION: 98 % | TEMPERATURE: 98 F | WEIGHT: 201.5 LBS | SYSTOLIC BLOOD PRESSURE: 138 MMHG

## 2021-01-08 DIAGNOSIS — M85.89 OSTEOPENIA OF MULTIPLE SITES: ICD-10-CM

## 2021-01-08 DIAGNOSIS — C50.812 MALIGNANT NEOPLASM OF OVERLAPPING SITES OF LEFT BREAST IN FEMALE, ESTROGEN RECEPTOR POSITIVE (HCC): ICD-10-CM

## 2021-01-08 DIAGNOSIS — M85.89 OSTEOPENIA OF MULTIPLE SITES: Primary | ICD-10-CM

## 2021-01-08 DIAGNOSIS — D51.8 OTHER VITAMIN B12 DEFICIENCY ANEMIA: ICD-10-CM

## 2021-01-08 DIAGNOSIS — Z17.0 MALIGNANT NEOPLASM OF OVERLAPPING SITES OF LEFT BREAST IN FEMALE, ESTROGEN RECEPTOR POSITIVE (HCC): ICD-10-CM

## 2021-01-08 LAB
ALBUMIN SERPL-MCNC: 4.6 G/DL (ref 3.5–5.2)
ALBUMIN/GLOB SERPL: 1.6 G/DL
ALP SERPL-CCNC: 110 U/L (ref 39–117)
ALT SERPL W P-5'-P-CCNC: 22 U/L (ref 1–33)
ANION GAP SERPL CALCULATED.3IONS-SCNC: 8.9 MMOL/L (ref 5–15)
AST SERPL-CCNC: 16 U/L (ref 1–32)
BASOPHILS # BLD AUTO: 0.07 10*3/MM3 (ref 0–0.2)
BASOPHILS NFR BLD AUTO: 0.7 % (ref 0–1.5)
BILIRUB SERPL-MCNC: 0.2 MG/DL (ref 0–1.2)
BUN SERPL-MCNC: 19 MG/DL (ref 6–20)
BUN/CREAT SERPL: 35.8 (ref 7–25)
CALCIUM SPEC-SCNC: 9.7 MG/DL (ref 8.6–10.5)
CHLORIDE SERPL-SCNC: 104 MMOL/L (ref 98–107)
CO2 SERPL-SCNC: 26.1 MMOL/L (ref 22–29)
CREAT SERPL-MCNC: 0.53 MG/DL (ref 0.57–1)
DEPRECATED RDW RBC AUTO: 43.2 FL (ref 37–54)
EOSINOPHIL # BLD AUTO: 0.31 10*3/MM3 (ref 0–0.4)
EOSINOPHIL NFR BLD AUTO: 3.2 % (ref 0.3–6.2)
ERYTHROCYTE [DISTWIDTH] IN BLOOD BY AUTOMATED COUNT: 13.4 % (ref 12.3–15.4)
FERRITIN SERPL-MCNC: 37.8 NG/ML (ref 13–150)
GFR SERPL CREATININE-BSD FRML MDRD: 124 ML/MIN/1.73
GLOBULIN UR ELPH-MCNC: 2.9 GM/DL
GLUCOSE SERPL-MCNC: 92 MG/DL (ref 65–99)
HCT VFR BLD AUTO: 39.7 % (ref 34–46.6)
HGB BLD-MCNC: 12.9 G/DL (ref 12–15.9)
IMM GRANULOCYTES # BLD AUTO: 0.02 10*3/MM3 (ref 0–0.05)
IMM GRANULOCYTES NFR BLD AUTO: 0.2 % (ref 0–0.5)
IRON 24H UR-MRATE: 51 MCG/DL (ref 37–145)
IRON SATN MFR SERPL: 11 % (ref 20–50)
LYMPHOCYTES # BLD AUTO: 3.25 10*3/MM3 (ref 0.7–3.1)
LYMPHOCYTES NFR BLD AUTO: 33 % (ref 19.6–45.3)
MAGNESIUM SERPL-MCNC: 2 MG/DL (ref 1.6–2.6)
MCH RBC QN AUTO: 28.4 PG (ref 26.6–33)
MCHC RBC AUTO-ENTMCNC: 32.5 G/DL (ref 31.5–35.7)
MCV RBC AUTO: 87.4 FL (ref 79–97)
MONOCYTES # BLD AUTO: 0.5 10*3/MM3 (ref 0.1–0.9)
MONOCYTES NFR BLD AUTO: 5.1 % (ref 5–12)
NEUTROPHILS NFR BLD AUTO: 5.69 10*3/MM3 (ref 1.7–7)
NEUTROPHILS NFR BLD AUTO: 57.8 % (ref 42.7–76)
NRBC BLD AUTO-RTO: 0 /100 WBC (ref 0–0.2)
PHOSPHATE SERPL-MCNC: 4 MG/DL (ref 2.5–4.5)
PLATELET # BLD AUTO: 392 10*3/MM3 (ref 140–450)
PMV BLD AUTO: 10 FL (ref 6–12)
POTASSIUM SERPL-SCNC: 3.8 MMOL/L (ref 3.5–5.2)
PROT SERPL-MCNC: 7.5 G/DL (ref 6–8.5)
RBC # BLD AUTO: 4.54 10*6/MM3 (ref 3.77–5.28)
SODIUM SERPL-SCNC: 139 MMOL/L (ref 136–145)
TIBC SERPL-MCNC: 469 MCG/DL (ref 298–536)
TRANSFERRIN SERPL-MCNC: 315 MG/DL (ref 200–360)
VIT B12 BLD-MCNC: 799 PG/ML (ref 211–946)
WBC # BLD AUTO: 9.84 10*3/MM3 (ref 3.4–10.8)

## 2021-01-08 PROCEDURE — 85025 COMPLETE CBC W/AUTO DIFF WBC: CPT

## 2021-01-08 PROCEDURE — 36415 COLL VENOUS BLD VENIPUNCTURE: CPT

## 2021-01-08 PROCEDURE — 25010000002 DENOSUMAB 60 MG/ML SOLUTION PREFILLED SYRINGE: Performed by: INTERNAL MEDICINE

## 2021-01-08 PROCEDURE — 84100 ASSAY OF PHOSPHORUS: CPT | Performed by: INTERNAL MEDICINE

## 2021-01-08 PROCEDURE — 82607 VITAMIN B-12: CPT | Performed by: INTERNAL MEDICINE

## 2021-01-08 PROCEDURE — 99215 OFFICE O/P EST HI 40 MIN: CPT | Performed by: INTERNAL MEDICINE

## 2021-01-08 PROCEDURE — 96372 THER/PROPH/DIAG INJ SC/IM: CPT

## 2021-01-08 PROCEDURE — 82728 ASSAY OF FERRITIN: CPT | Performed by: INTERNAL MEDICINE

## 2021-01-08 PROCEDURE — 83540 ASSAY OF IRON: CPT | Performed by: INTERNAL MEDICINE

## 2021-01-08 PROCEDURE — 83735 ASSAY OF MAGNESIUM: CPT | Performed by: INTERNAL MEDICINE

## 2021-01-08 PROCEDURE — 84466 ASSAY OF TRANSFERRIN: CPT | Performed by: INTERNAL MEDICINE

## 2021-01-08 PROCEDURE — 80053 COMPREHEN METABOLIC PANEL: CPT | Performed by: INTERNAL MEDICINE

## 2021-01-08 RX ADMIN — DENOSUMAB 60 MG: 60 INJECTION SUBCUTANEOUS at 17:23

## 2021-01-11 ENCOUNTER — DOCUMENTATION (OUTPATIENT)
Dept: ONCOLOGY | Facility: CLINIC | Age: 46
End: 2021-01-11

## 2021-01-11 NOTE — PROGRESS NOTES
"I have been notified by Aga Bocanegra, Pharmacist that pt is receiving Injectafer. She has commercial insurance and can use the copay card. I have enrolled pt and sent all billing information to Suzan Abbasi for processing.     Under the Injectafer Savings Program, eligible insured patients may pay no more than $50 for Injectafer for the first dose and $0 for Injectafer for the second dose, up to a maximum savings limit of $500 per dose, a $1,000 program limit per course of therapy.\"The offer is valid for 2 courses, or 4 doses, of an Injectafer prescription. One enrollment is allowed per 12-month period.    "

## 2021-01-15 ENCOUNTER — INFUSION (OUTPATIENT)
Dept: ONCOLOGY | Facility: HOSPITAL | Age: 46
End: 2021-01-15

## 2021-01-15 VITALS
BODY MASS INDEX: 34.25 KG/M2 | DIASTOLIC BLOOD PRESSURE: 82 MMHG | RESPIRATION RATE: 16 BRPM | SYSTOLIC BLOOD PRESSURE: 145 MMHG | TEMPERATURE: 97.1 F | OXYGEN SATURATION: 96 % | WEIGHT: 205.8 LBS | HEART RATE: 95 BPM

## 2021-01-15 DIAGNOSIS — D50.8 OTHER IRON DEFICIENCY ANEMIA: Primary | ICD-10-CM

## 2021-01-15 DIAGNOSIS — T45.4X5A ADVERSE EFFECT OF IRON, INITIAL ENCOUNTER: ICD-10-CM

## 2021-01-15 PROCEDURE — 63710000001 PROCHLORPERAZINE MALEATE PER 10 MG: Performed by: INTERNAL MEDICINE

## 2021-01-15 PROCEDURE — 25010000002 FERRIC CARBOXYMALTOSE 750 MG/15ML SOLUTION 15 ML VIAL: Performed by: INTERNAL MEDICINE

## 2021-01-15 PROCEDURE — 96374 THER/PROPH/DIAG INJ IV PUSH: CPT

## 2021-01-15 RX ORDER — SODIUM CHLORIDE 9 MG/ML
250 INJECTION, SOLUTION INTRAVENOUS ONCE
Status: CANCELLED | OUTPATIENT
Start: 2021-01-22

## 2021-01-15 RX ORDER — PROCHLORPERAZINE MALEATE 10 MG
10 TABLET ORAL ONCE
Status: CANCELLED
Start: 2021-01-22

## 2021-01-15 RX ORDER — PROCHLORPERAZINE MALEATE 10 MG
10 TABLET ORAL ONCE
Status: CANCELLED
Start: 2021-01-15

## 2021-01-15 RX ORDER — SODIUM CHLORIDE 9 MG/ML
250 INJECTION, SOLUTION INTRAVENOUS ONCE
Status: CANCELLED | OUTPATIENT
Start: 2021-01-15

## 2021-01-15 RX ORDER — SODIUM CHLORIDE 9 MG/ML
250 INJECTION, SOLUTION INTRAVENOUS ONCE
Status: COMPLETED | OUTPATIENT
Start: 2021-01-15 | End: 2021-01-15

## 2021-01-15 RX ORDER — PROCHLORPERAZINE MALEATE 10 MG
10 TABLET ORAL ONCE
Status: COMPLETED | OUTPATIENT
Start: 2021-01-15 | End: 2021-01-15

## 2021-01-15 RX ADMIN — SODIUM CHLORIDE 250 ML: 9 INJECTION, SOLUTION INTRAVENOUS at 16:09

## 2021-01-15 RX ADMIN — FERRIC CARBOXYMALTOSE INJECTION 750 MG: 50 INJECTION, SOLUTION INTRAVENOUS at 16:11

## 2021-01-15 RX ADMIN — PROCHLORPERAZINE MALEATE 10 MG: 10 TABLET ORAL at 16:09

## 2021-01-22 ENCOUNTER — INFUSION (OUTPATIENT)
Dept: ONCOLOGY | Facility: HOSPITAL | Age: 46
End: 2021-01-22

## 2021-01-22 VITALS — DIASTOLIC BLOOD PRESSURE: 82 MMHG | SYSTOLIC BLOOD PRESSURE: 144 MMHG | HEART RATE: 89 BPM

## 2021-01-22 DIAGNOSIS — T45.4X5A ADVERSE EFFECT OF IRON, INITIAL ENCOUNTER: Primary | ICD-10-CM

## 2021-01-22 DIAGNOSIS — D50.8 OTHER IRON DEFICIENCY ANEMIA: ICD-10-CM

## 2021-01-22 PROCEDURE — 25010000002 FERRIC CARBOXYMALTOSE 750 MG/15ML SOLUTION 15 ML VIAL: Performed by: INTERNAL MEDICINE

## 2021-01-22 PROCEDURE — 96374 THER/PROPH/DIAG INJ IV PUSH: CPT

## 2021-01-22 PROCEDURE — 63710000001 PROCHLORPERAZINE MALEATE PER 10 MG: Performed by: INTERNAL MEDICINE

## 2021-01-22 RX ORDER — SODIUM CHLORIDE 9 MG/ML
250 INJECTION, SOLUTION INTRAVENOUS ONCE
Status: COMPLETED | OUTPATIENT
Start: 2021-01-22 | End: 2021-01-22

## 2021-01-22 RX ORDER — PROCHLORPERAZINE MALEATE 10 MG
10 TABLET ORAL ONCE
Status: COMPLETED | OUTPATIENT
Start: 2021-01-22 | End: 2021-01-22

## 2021-01-22 RX ADMIN — PROCHLORPERAZINE MALEATE 10 MG: 10 TABLET ORAL at 15:45

## 2021-01-22 RX ADMIN — FERRIC CARBOXYMALTOSE INJECTION 750 MG: 50 INJECTION, SOLUTION INTRAVENOUS at 15:53

## 2021-01-22 RX ADMIN — SODIUM CHLORIDE 250 ML: 900 INJECTION, SOLUTION INTRAVENOUS at 15:52

## 2021-02-22 ENCOUNTER — PRE-ADMISSION TESTING (OUTPATIENT)
Dept: PREADMISSION TESTING | Facility: HOSPITAL | Age: 46
End: 2021-02-22

## 2021-02-22 VITALS
HEART RATE: 93 BPM | BODY MASS INDEX: 35.16 KG/M2 | WEIGHT: 211 LBS | SYSTOLIC BLOOD PRESSURE: 114 MMHG | TEMPERATURE: 99.4 F | HEIGHT: 65 IN | DIASTOLIC BLOOD PRESSURE: 75 MMHG | RESPIRATION RATE: 16 BRPM | OXYGEN SATURATION: 96 %

## 2021-02-22 LAB
ANION GAP SERPL CALCULATED.3IONS-SCNC: 10.6 MMOL/L (ref 5–15)
BASOPHILS # BLD AUTO: 0.06 10*3/MM3 (ref 0–0.2)
BASOPHILS NFR BLD AUTO: 0.9 % (ref 0–1.5)
BUN SERPL-MCNC: 12 MG/DL (ref 6–20)
BUN/CREAT SERPL: 17.9 (ref 7–25)
CALCIUM SPEC-SCNC: 9 MG/DL (ref 8.6–10.5)
CHLORIDE SERPL-SCNC: 105 MMOL/L (ref 98–107)
CO2 SERPL-SCNC: 25.4 MMOL/L (ref 22–29)
CREAT SERPL-MCNC: 0.67 MG/DL (ref 0.57–1)
DEPRECATED RDW RBC AUTO: 46.2 FL (ref 37–54)
EOSINOPHIL # BLD AUTO: 0.24 10*3/MM3 (ref 0–0.4)
EOSINOPHIL NFR BLD AUTO: 3.5 % (ref 0.3–6.2)
ERYTHROCYTE [DISTWIDTH] IN BLOOD BY AUTOMATED COUNT: 13.9 % (ref 12.3–15.4)
GFR SERPL CREATININE-BSD FRML MDRD: 94 ML/MIN/1.73
GLUCOSE SERPL-MCNC: 136 MG/DL (ref 65–99)
HCT VFR BLD AUTO: 43 % (ref 34–46.6)
HGB BLD-MCNC: 14.1 G/DL (ref 12–15.9)
IMM GRANULOCYTES # BLD AUTO: 0.02 10*3/MM3 (ref 0–0.05)
IMM GRANULOCYTES NFR BLD AUTO: 0.3 % (ref 0–0.5)
LYMPHOCYTES # BLD AUTO: 2.2 10*3/MM3 (ref 0.7–3.1)
LYMPHOCYTES NFR BLD AUTO: 32.1 % (ref 19.6–45.3)
MCH RBC QN AUTO: 29.7 PG (ref 26.6–33)
MCHC RBC AUTO-ENTMCNC: 32.8 G/DL (ref 31.5–35.7)
MCV RBC AUTO: 90.5 FL (ref 79–97)
MONOCYTES # BLD AUTO: 0.3 10*3/MM3 (ref 0.1–0.9)
MONOCYTES NFR BLD AUTO: 4.4 % (ref 5–12)
NEUTROPHILS NFR BLD AUTO: 4.03 10*3/MM3 (ref 1.7–7)
NEUTROPHILS NFR BLD AUTO: 58.8 % (ref 42.7–76)
NRBC BLD AUTO-RTO: 0 /100 WBC (ref 0–0.2)
PLATELET # BLD AUTO: 330 10*3/MM3 (ref 140–450)
PMV BLD AUTO: 9.9 FL (ref 6–12)
POTASSIUM SERPL-SCNC: 4.1 MMOL/L (ref 3.5–5.2)
QT INTERVAL: 398 MS
RBC # BLD AUTO: 4.75 10*6/MM3 (ref 3.77–5.28)
SODIUM SERPL-SCNC: 141 MMOL/L (ref 136–145)
WBC # BLD AUTO: 6.85 10*3/MM3 (ref 3.4–10.8)

## 2021-02-22 PROCEDURE — 85025 COMPLETE CBC W/AUTO DIFF WBC: CPT

## 2021-02-22 PROCEDURE — 80048 BASIC METABOLIC PNL TOTAL CA: CPT

## 2021-02-22 PROCEDURE — U0004 COV-19 TEST NON-CDC HGH THRU: HCPCS | Performed by: NURSE PRACTITIONER

## 2021-02-22 PROCEDURE — C9803 HOPD COVID-19 SPEC COLLECT: HCPCS | Performed by: NURSE PRACTITIONER

## 2021-02-22 PROCEDURE — 93010 ELECTROCARDIOGRAM REPORT: CPT | Performed by: INTERNAL MEDICINE

## 2021-02-22 PROCEDURE — 36415 COLL VENOUS BLD VENIPUNCTURE: CPT

## 2021-02-22 RX ORDER — HYDROXYCHLOROQUINE SULFATE 200 MG/1
200 TABLET, FILM COATED ORAL EVERY EVENING
COMMUNITY
End: 2021-02-25 | Stop reason: HOSPADM

## 2021-02-23 LAB — SARS-COV-2 ORF1AB RESP QL NAA+PROBE: NOT DETECTED

## 2021-02-25 ENCOUNTER — HOSPITAL ENCOUNTER (OUTPATIENT)
Facility: HOSPITAL | Age: 46
Setting detail: HOSPITAL OUTPATIENT SURGERY
Discharge: HOME | End: 2021-02-25
Attending: SURGERY | Admitting: SURGERY

## 2021-02-25 ENCOUNTER — ANESTHESIA EVENT (OUTPATIENT)
Dept: PERIOP | Facility: HOSPITAL | Age: 46
End: 2021-02-25

## 2021-02-25 ENCOUNTER — ANESTHESIA (OUTPATIENT)
Dept: PERIOP | Facility: HOSPITAL | Age: 46
End: 2021-02-25

## 2021-02-25 VITALS
WEIGHT: 212.2 LBS | OXYGEN SATURATION: 92 % | HEIGHT: 65 IN | SYSTOLIC BLOOD PRESSURE: 121 MMHG | TEMPERATURE: 98 F | DIASTOLIC BLOOD PRESSURE: 69 MMHG | HEART RATE: 98 BPM | RESPIRATION RATE: 16 BRPM | BODY MASS INDEX: 35.35 KG/M2

## 2021-02-25 DIAGNOSIS — I89.0 LYMPHEDEMA OF UPPER EXTREMITY: ICD-10-CM

## 2021-02-25 DIAGNOSIS — Z85.3 HISTORY OF LEFT BREAST CANCER: ICD-10-CM

## 2021-02-25 DIAGNOSIS — C50.812 MALIGNANT NEOPLASM OF OVERLAPPING SITES OF LEFT BREAST IN FEMALE, ESTROGEN RECEPTOR POSITIVE (HCC): ICD-10-CM

## 2021-02-25 DIAGNOSIS — Z17.0 MALIGNANT NEOPLASM OF OVERLAPPING SITES OF LEFT BREAST IN FEMALE, ESTROGEN RECEPTOR POSITIVE (HCC): ICD-10-CM

## 2021-02-25 DIAGNOSIS — Z85.3 HISTORY OF BREAST CANCER: Primary | ICD-10-CM

## 2021-02-25 DIAGNOSIS — Z90.12 ACQUIRED ABSENCE OF LEFT BREAST: ICD-10-CM

## 2021-02-25 PROCEDURE — 25010000002 PROPOFOL 10 MG/ML EMULSION: Performed by: NURSE ANESTHETIST, CERTIFIED REGISTERED

## 2021-02-25 PROCEDURE — 25010000002 ONDANSETRON PER 1 MG: Performed by: NURSE ANESTHETIST, CERTIFIED REGISTERED

## 2021-02-25 PROCEDURE — 25010000002 NEOSTIGMINE PER 0.5 MG: Performed by: NURSE ANESTHETIST, CERTIFIED REGISTERED

## 2021-02-25 PROCEDURE — 25010000002 ROPIVACAINE PER 1 MG: Performed by: SURGERY

## 2021-02-25 PROCEDURE — 88302 TISSUE EXAM BY PATHOLOGIST: CPT | Performed by: SURGERY

## 2021-02-25 PROCEDURE — 25010000002 MIDAZOLAM PER 1 MG: Performed by: ANESTHESIOLOGY

## 2021-02-25 PROCEDURE — 25010000002 FENTANYL CITRATE (PF) 100 MCG/2ML SOLUTION: Performed by: NURSE ANESTHETIST, CERTIFIED REGISTERED

## 2021-02-25 PROCEDURE — 25010000002 DEXAMETHASONE PER 1 MG: Performed by: SURGERY

## 2021-02-25 RX ORDER — OXYCODONE AND ACETAMINOPHEN 7.5; 325 MG/1; MG/1
1 TABLET ORAL ONCE AS NEEDED
Status: COMPLETED | OUTPATIENT
Start: 2021-02-25 | End: 2021-02-25

## 2021-02-25 RX ORDER — LABETALOL HYDROCHLORIDE 5 MG/ML
5 INJECTION, SOLUTION INTRAVENOUS
Status: DISCONTINUED | OUTPATIENT
Start: 2021-02-25 | End: 2021-02-25 | Stop reason: HOSPADM

## 2021-02-25 RX ORDER — DEXAMETHASONE SODIUM PHOSPHATE 4 MG/ML
8 INJECTION, SOLUTION INTRA-ARTICULAR; INTRALESIONAL; INTRAMUSCULAR; INTRAVENOUS; SOFT TISSUE ONCE
Status: COMPLETED | OUTPATIENT
Start: 2021-02-25 | End: 2021-02-25

## 2021-02-25 RX ORDER — ACETAMINOPHEN 325 MG/1
975 TABLET ORAL ONCE
Status: COMPLETED | OUTPATIENT
Start: 2021-02-25 | End: 2021-02-25

## 2021-02-25 RX ORDER — OXYCODONE AND ACETAMINOPHEN 10; 325 MG/1; MG/1
TABLET ORAL
Qty: 20 TABLET | Refills: 0 | Status: SHIPPED | OUTPATIENT
Start: 2021-02-25 | End: 2021-07-23

## 2021-02-25 RX ORDER — PROMETHAZINE HYDROCHLORIDE 25 MG/1
25 SUPPOSITORY RECTAL ONCE AS NEEDED
Status: DISCONTINUED | OUTPATIENT
Start: 2021-02-25 | End: 2021-02-25 | Stop reason: HOSPADM

## 2021-02-25 RX ORDER — HYDROMORPHONE HYDROCHLORIDE 1 MG/ML
0.5 INJECTION, SOLUTION INTRAMUSCULAR; INTRAVENOUS; SUBCUTANEOUS
Status: DISCONTINUED | OUTPATIENT
Start: 2021-02-25 | End: 2021-02-25 | Stop reason: HOSPADM

## 2021-02-25 RX ORDER — FLUMAZENIL 0.1 MG/ML
0.2 INJECTION INTRAVENOUS AS NEEDED
Status: DISCONTINUED | OUTPATIENT
Start: 2021-02-25 | End: 2021-02-25 | Stop reason: HOSPADM

## 2021-02-25 RX ORDER — FAMOTIDINE 10 MG/ML
20 INJECTION, SOLUTION INTRAVENOUS ONCE
Status: COMPLETED | OUTPATIENT
Start: 2021-02-25 | End: 2021-02-25

## 2021-02-25 RX ORDER — PROMETHAZINE HYDROCHLORIDE 25 MG/1
25 TABLET ORAL ONCE AS NEEDED
Status: DISCONTINUED | OUTPATIENT
Start: 2021-02-25 | End: 2021-02-25 | Stop reason: HOSPADM

## 2021-02-25 RX ORDER — DIPHENHYDRAMINE HYDROCHLORIDE 50 MG/ML
12.5 INJECTION INTRAMUSCULAR; INTRAVENOUS
Status: DISCONTINUED | OUTPATIENT
Start: 2021-02-25 | End: 2021-02-25 | Stop reason: HOSPADM

## 2021-02-25 RX ORDER — GLYCOPYRROLATE 0.2 MG/ML
INJECTION INTRAMUSCULAR; INTRAVENOUS AS NEEDED
Status: DISCONTINUED | OUTPATIENT
Start: 2021-02-25 | End: 2021-02-25 | Stop reason: SURG

## 2021-02-25 RX ORDER — LIDOCAINE HYDROCHLORIDE 10 MG/ML
0.5 INJECTION, SOLUTION EPIDURAL; INFILTRATION; INTRACAUDAL; PERINEURAL ONCE AS NEEDED
Status: DISCONTINUED | OUTPATIENT
Start: 2021-02-25 | End: 2021-02-25 | Stop reason: HOSPADM

## 2021-02-25 RX ORDER — ONDANSETRON 2 MG/ML
4 INJECTION INTRAMUSCULAR; INTRAVENOUS ONCE AS NEEDED
Status: DISCONTINUED | OUTPATIENT
Start: 2021-02-25 | End: 2021-02-25 | Stop reason: HOSPADM

## 2021-02-25 RX ORDER — CELECOXIB 200 MG/1
400 CAPSULE ORAL ONCE
Status: COMPLETED | OUTPATIENT
Start: 2021-02-25 | End: 2021-02-25

## 2021-02-25 RX ORDER — HYDRALAZINE HYDROCHLORIDE 20 MG/ML
5 INJECTION INTRAMUSCULAR; INTRAVENOUS
Status: DISCONTINUED | OUTPATIENT
Start: 2021-02-25 | End: 2021-02-25 | Stop reason: HOSPADM

## 2021-02-25 RX ORDER — MIDAZOLAM HYDROCHLORIDE 1 MG/ML
2 INJECTION INTRAMUSCULAR; INTRAVENOUS
Status: DISCONTINUED | OUTPATIENT
Start: 2021-02-25 | End: 2021-02-25 | Stop reason: HOSPADM

## 2021-02-25 RX ORDER — EPHEDRINE SULFATE 50 MG/ML
5 INJECTION, SOLUTION INTRAVENOUS ONCE AS NEEDED
Status: DISCONTINUED | OUTPATIENT
Start: 2021-02-25 | End: 2021-02-25 | Stop reason: HOSPADM

## 2021-02-25 RX ORDER — FENTANYL CITRATE 50 UG/ML
INJECTION, SOLUTION INTRAMUSCULAR; INTRAVENOUS AS NEEDED
Status: DISCONTINUED | OUTPATIENT
Start: 2021-02-25 | End: 2021-02-25 | Stop reason: SURG

## 2021-02-25 RX ORDER — DOCUSATE SODIUM 100 MG/1
100 CAPSULE, LIQUID FILLED ORAL ONCE
Status: DISCONTINUED | OUTPATIENT
Start: 2021-02-25 | End: 2021-02-25 | Stop reason: HOSPADM

## 2021-02-25 RX ORDER — CYCLOBENZAPRINE HCL 10 MG
10 TABLET ORAL ONCE
Status: COMPLETED | OUTPATIENT
Start: 2021-02-25 | End: 2021-02-25

## 2021-02-25 RX ORDER — MIDAZOLAM HYDROCHLORIDE 1 MG/ML
1 INJECTION INTRAMUSCULAR; INTRAVENOUS
Status: DISCONTINUED | OUTPATIENT
Start: 2021-02-25 | End: 2021-02-25 | Stop reason: HOSPADM

## 2021-02-25 RX ORDER — PROPOFOL 10 MG/ML
VIAL (ML) INTRAVENOUS AS NEEDED
Status: DISCONTINUED | OUTPATIENT
Start: 2021-02-25 | End: 2021-02-25 | Stop reason: SURG

## 2021-02-25 RX ORDER — FENTANYL CITRATE 50 UG/ML
50 INJECTION, SOLUTION INTRAMUSCULAR; INTRAVENOUS
Status: DISCONTINUED | OUTPATIENT
Start: 2021-02-25 | End: 2021-02-25 | Stop reason: HOSPADM

## 2021-02-25 RX ORDER — AZITHROMYCIN 250 MG/1
TABLET, FILM COATED ORAL
Qty: 6 TABLET | Refills: 0 | Status: SHIPPED | OUTPATIENT
Start: 2021-02-25 | End: 2021-03-02

## 2021-02-25 RX ORDER — SODIUM CHLORIDE 0.9 % (FLUSH) 0.9 %
3 SYRINGE (ML) INJECTION EVERY 12 HOURS SCHEDULED
Status: DISCONTINUED | OUTPATIENT
Start: 2021-02-25 | End: 2021-02-25 | Stop reason: HOSPADM

## 2021-02-25 RX ORDER — SODIUM CHLORIDE, SODIUM LACTATE, POTASSIUM CHLORIDE, CALCIUM CHLORIDE 600; 310; 30; 20 MG/100ML; MG/100ML; MG/100ML; MG/100ML
9 INJECTION, SOLUTION INTRAVENOUS CONTINUOUS
Status: DISCONTINUED | OUTPATIENT
Start: 2021-02-25 | End: 2021-02-25 | Stop reason: HOSPADM

## 2021-02-25 RX ORDER — CLINDAMYCIN PHOSPHATE 900 MG/50ML
900 INJECTION INTRAVENOUS ONCE
Status: COMPLETED | OUTPATIENT
Start: 2021-02-25 | End: 2021-02-25

## 2021-02-25 RX ORDER — OXYCODONE HYDROCHLORIDE AND ACETAMINOPHEN 5; 325 MG/1; MG/1
1 TABLET ORAL ONCE AS NEEDED
Status: DISCONTINUED | OUTPATIENT
Start: 2021-02-25 | End: 2021-02-25 | Stop reason: HOSPADM

## 2021-02-25 RX ORDER — ROCURONIUM BROMIDE 10 MG/ML
INJECTION, SOLUTION INTRAVENOUS AS NEEDED
Status: DISCONTINUED | OUTPATIENT
Start: 2021-02-25 | End: 2021-02-25 | Stop reason: SURG

## 2021-02-25 RX ORDER — LIDOCAINE HYDROCHLORIDE 20 MG/ML
INJECTION, SOLUTION INFILTRATION; PERINEURAL AS NEEDED
Status: DISCONTINUED | OUTPATIENT
Start: 2021-02-25 | End: 2021-02-25 | Stop reason: SURG

## 2021-02-25 RX ORDER — HYDROCODONE BITARTRATE AND ACETAMINOPHEN 7.5; 325 MG/1; MG/1
1 TABLET ORAL ONCE AS NEEDED
Status: DISCONTINUED | OUTPATIENT
Start: 2021-02-25 | End: 2021-02-25 | Stop reason: HOSPADM

## 2021-02-25 RX ORDER — ONDANSETRON 2 MG/ML
INJECTION INTRAMUSCULAR; INTRAVENOUS AS NEEDED
Status: DISCONTINUED | OUTPATIENT
Start: 2021-02-25 | End: 2021-02-25 | Stop reason: SURG

## 2021-02-25 RX ORDER — NALOXONE HCL 0.4 MG/ML
0.2 VIAL (ML) INJECTION AS NEEDED
Status: DISCONTINUED | OUTPATIENT
Start: 2021-02-25 | End: 2021-02-25 | Stop reason: HOSPADM

## 2021-02-25 RX ORDER — SODIUM CHLORIDE 0.9 % (FLUSH) 0.9 %
3-10 SYRINGE (ML) INJECTION AS NEEDED
Status: DISCONTINUED | OUTPATIENT
Start: 2021-02-25 | End: 2021-02-25 | Stop reason: HOSPADM

## 2021-02-25 RX ORDER — HYDROMORPHONE HYDROCHLORIDE 1 MG/ML
0.25 INJECTION, SOLUTION INTRAMUSCULAR; INTRAVENOUS; SUBCUTANEOUS
Status: DISCONTINUED | OUTPATIENT
Start: 2021-02-25 | End: 2021-02-25 | Stop reason: HOSPADM

## 2021-02-25 RX ORDER — DIPHENHYDRAMINE HCL 25 MG
25 CAPSULE ORAL
Status: DISCONTINUED | OUTPATIENT
Start: 2021-02-25 | End: 2021-02-25 | Stop reason: HOSPADM

## 2021-02-25 RX ORDER — HYDROXYZINE PAMOATE 50 MG/1
50 CAPSULE ORAL ONCE
Status: COMPLETED | OUTPATIENT
Start: 2021-02-25 | End: 2021-02-25

## 2021-02-25 RX ORDER — ONDANSETRON 4 MG/1
4 TABLET, FILM COATED ORAL ONCE AS NEEDED
Status: DISCONTINUED | OUTPATIENT
Start: 2021-02-25 | End: 2021-02-25 | Stop reason: HOSPADM

## 2021-02-25 RX ORDER — CELECOXIB 200 MG/1
200 CAPSULE ORAL ONCE
Status: DISCONTINUED | OUTPATIENT
Start: 2021-02-25 | End: 2021-02-25 | Stop reason: HOSPADM

## 2021-02-25 RX ORDER — SCOLOPAMINE TRANSDERMAL SYSTEM 1 MG/1
1 PATCH, EXTENDED RELEASE TRANSDERMAL ONCE
Status: DISCONTINUED | OUTPATIENT
Start: 2021-02-25 | End: 2021-02-25 | Stop reason: HOSPADM

## 2021-02-25 RX ORDER — PROMETHAZINE HYDROCHLORIDE 12.5 MG/1
12.5 TABLET ORAL EVERY 6 HOURS PRN
Qty: 10 TABLET | Refills: 0 | Status: SHIPPED | OUTPATIENT
Start: 2021-02-25 | End: 2021-07-23

## 2021-02-25 RX ORDER — PROMETHAZINE HYDROCHLORIDE 25 MG/1
12.5 TABLET ORAL ONCE AS NEEDED
Status: DISCONTINUED | OUTPATIENT
Start: 2021-02-25 | End: 2021-02-25 | Stop reason: HOSPADM

## 2021-02-25 RX ADMIN — NEOSTIGMINE METHYLSULFATE 3 MG: 1 INJECTION INTRAMUSCULAR; INTRAVENOUS; SUBCUTANEOUS at 08:39

## 2021-02-25 RX ADMIN — FENTANYL CITRATE 50 MCG: 50 INJECTION INTRAMUSCULAR; INTRAVENOUS at 08:36

## 2021-02-25 RX ADMIN — CYCLOBENZAPRINE 10 MG: 10 TABLET, FILM COATED ORAL at 06:19

## 2021-02-25 RX ADMIN — MIDAZOLAM 1 MG: 1 INJECTION INTRAMUSCULAR; INTRAVENOUS at 06:39

## 2021-02-25 RX ADMIN — ROCURONIUM BROMIDE 40 MG: 50 INJECTION INTRAVENOUS at 07:30

## 2021-02-25 RX ADMIN — CLINDAMYCIN IN 5 PERCENT DEXTROSE 900 MG: 18 INJECTION, SOLUTION INTRAVENOUS at 07:39

## 2021-02-25 RX ADMIN — LIDOCAINE HYDROCHLORIDE 60 MG: 20 INJECTION, SOLUTION INFILTRATION; PERINEURAL at 07:30

## 2021-02-25 RX ADMIN — PROPOFOL 200 MG: 10 INJECTION, EMULSION INTRAVENOUS at 07:30

## 2021-02-25 RX ADMIN — CELECOXIB 400 MG: 200 CAPSULE ORAL at 06:19

## 2021-02-25 RX ADMIN — FENTANYL CITRATE 50 MCG: 50 INJECTION INTRAMUSCULAR; INTRAVENOUS at 08:39

## 2021-02-25 RX ADMIN — HYDROXYZINE PAMOATE 50 MG: 50 CAPSULE ORAL at 06:19

## 2021-02-25 RX ADMIN — FAMOTIDINE 20 MG: 10 INJECTION INTRAVENOUS at 06:39

## 2021-02-25 RX ADMIN — ONDANSETRON 4 MG: 2 INJECTION INTRAMUSCULAR; INTRAVENOUS at 08:31

## 2021-02-25 RX ADMIN — OXYCODONE HYDROCHLORIDE AND ACETAMINOPHEN 1 TABLET: 7.5; 325 TABLET ORAL at 09:22

## 2021-02-25 RX ADMIN — GLYCOPYRROLATE 0.3 MG: 0.2 INJECTION INTRAMUSCULAR; INTRAVENOUS at 08:39

## 2021-02-25 RX ADMIN — ACETAMINOPHEN 975 MG: 325 TABLET, FILM COATED ORAL at 06:19

## 2021-02-25 RX ADMIN — SCOPALAMINE 1 PATCH: 1 PATCH, EXTENDED RELEASE TRANSDERMAL at 06:19

## 2021-02-25 RX ADMIN — DEXAMETHASONE SODIUM PHOSPHATE 8 MG: 4 INJECTION, SOLUTION INTRAMUSCULAR; INTRAVENOUS at 06:19

## 2021-02-25 RX ADMIN — SODIUM CHLORIDE, POTASSIUM CHLORIDE, SODIUM LACTATE AND CALCIUM CHLORIDE 9 ML/HR: 600; 310; 30; 20 INJECTION, SOLUTION INTRAVENOUS at 06:39

## 2021-02-25 NOTE — ANESTHESIA PROCEDURE NOTES
Airway  Urgency: elective    Date/Time: 2/25/2021 7:33 AM  Airway not difficult    General Information and Staff    Patient location during procedure: OR  Anesthesiologist: Israel Valle MD  CRNA: Jessica Herron CRNA    Indications and Patient Condition  Indications for airway management: airway protection    Preoxygenated: yes  MILS maintained throughout  Mask difficulty assessment: 1 - vent by mask    Final Airway Details  Final airway type: endotracheal airway      Successful airway: ETT  Cuffed: yes   Successful intubation technique: direct laryngoscopy  Facilitating devices/methods: anterior pressure/BURP and intubating stylet  Endotracheal tube insertion site: oral  Blade: Olmedo  Blade size: 2  ETT size (mm): 7.0  Cormack-Lehane Classification: grade I - full view of glottis  Placement verified by: chest auscultation and capnometry   Cuff volume (mL): 8  Measured from: lips  ETT/EBT  to lips (cm): 21  Number of attempts at approach: 1  Assessment: lips, teeth, and gum same as pre-op and atraumatic intubation    Additional Comments  Pt preoxygenated, SIVI, bag mask vent, ATETI, dentition as before

## 2021-02-25 NOTE — ANESTHESIA POSTPROCEDURE EVALUATION
Patient: Yu Thakur    Procedure Summary     Date: 02/25/21 Room / Location: [facility] OR  / [facility] MAIN OR    Anesthesia Start: 0726 Anesthesia Stop: 0855    Procedure: AXILLARY EXCISION AND REVISION (Left Breast) Diagnosis:     Surgeon: KAUSHAL Montgomery MD Provider: Israel Valle MD    Anesthesia Type: general ASA Status: 2          Anesthesia Type: general    Vitals  Vitals Value Taken Time   /85 02/25/21 0915   Temp 36.7 °C (98 °F) 02/25/21 0855   Pulse 88 02/25/21 0918   Resp 16 02/25/21 0905   SpO2 94 % 02/25/21 0918   Vitals shown include unvalidated device data.        Post Anesthesia Care and Evaluation    Patient location during evaluation: PACU  Patient participation: complete - patient participated  Level of consciousness: awake and alert  Pain management: adequate  Airway patency: patent  Anesthetic complications: No anesthetic complications    Cardiovascular status: acceptable  Respiratory status: acceptable  Hydration status: acceptable    Comments: --------------------            02/25/21 0905     --------------------   BP:                  Pulse:               Resp:       16       Temp:                SpO2:               --------------------

## 2021-02-25 NOTE — ANESTHESIA PREPROCEDURE EVALUATION
Anesthesia Evaluation     Patient summary reviewed and Nursing notes reviewed                Airway   Mallampati: II  TM distance: >3 FB  Neck ROM: full  Dental      Pulmonary    (+) sleep apnea on CPAP,   Cardiovascular     ECG reviewed  Rate: normal    (+) hypertension,       Neuro/Psych  (+) numbness, psychiatric history Anxiety and Depression,     GI/Hepatic/Renal/Endo    (+) obesity,       Musculoskeletal     Abdominal    Substance History - negative use     OB/GYN negative ob/gyn ROS         Other   arthritis,    history of cancer                    Anesthesia Plan    ASA 2     general     intravenous induction     Anesthetic plan, all risks, benefits, and alternatives have been provided, discussed and informed consent has been obtained with: patient.

## 2021-03-11 RX ORDER — ANASTROZOLE 1 MG/1
TABLET ORAL
Qty: 90 TABLET | Refills: 1 | Status: SHIPPED | OUTPATIENT
Start: 2021-03-11 | End: 2021-09-09

## 2021-03-19 ENCOUNTER — TELEPHONE (OUTPATIENT)
Dept: ONCOLOGY | Facility: CLINIC | Age: 46
End: 2021-03-19

## 2021-03-19 NOTE — TELEPHONE ENCOUNTER
[facility] HAS APPLIED FOR A BOX OF HOPE AND THEY'RE NEEDING PROOF OF HER BREAST CANCER DIAGNOSIS BEFORE SENDING THE BOX. SHE'S ASKING FOR SOMETHING TO BE EMAILED TO THEM: [email or web address] AS SOON AS POSSIBLE    PH# [phone]

## 2021-05-13 ENCOUNTER — TELEPHONE (OUTPATIENT)
Dept: ONCOLOGY | Facility: CLINIC | Age: 46
End: 2021-05-13

## 2021-05-13 NOTE — TELEPHONE ENCOUNTER
Caller: [employer]     Relationship:   SELF   Best call back number: [phone]    THIS IS JUST A FYI THAT THE PATIENT CONFIRMED THE RESCHEDULED APPT TO 7-23 WITH THE HUB.  ADDED TO APPT NOTES.   THANK YOU

## 2021-07-19 ENCOUNTER — TELEPHONE (OUTPATIENT)
Dept: ONCOLOGY | Facility: CLINIC | Age: 46
End: 2021-07-19

## 2021-07-19 NOTE — TELEPHONE ENCOUNTER
Called Dermatology back. 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 Spoke with Dr. Lyon and per his order okay to start this. Called [facility] back. Spoke with Floresita and she took message to inform [facility] and JUAN DANIEL Valiente.

## 2021-07-19 NOTE — TELEPHONE ENCOUNTER
They want to add medication  Fluconazole 200 mg 1 weekly for 6 months.  Want to make sure it's ok with us.

## 2021-07-23 ENCOUNTER — OFFICE VISIT (OUTPATIENT)
Dept: ONCOLOGY | Facility: CLINIC | Age: 46
End: 2021-07-23

## 2021-07-23 ENCOUNTER — LAB (OUTPATIENT)
Dept: LAB | Facility: HOSPITAL | Age: 46
End: 2021-07-23

## 2021-07-23 ENCOUNTER — INFUSION (OUTPATIENT)
Dept: ONCOLOGY | Facility: HOSPITAL | Age: 46
End: 2021-07-23

## 2021-07-23 VITALS
BODY MASS INDEX: 37.29 KG/M2 | OXYGEN SATURATION: 97 % | HEART RATE: 79 BPM | SYSTOLIC BLOOD PRESSURE: 124 MMHG | RESPIRATION RATE: 18 BRPM | WEIGHT: 223.8 LBS | DIASTOLIC BLOOD PRESSURE: 74 MMHG | HEIGHT: 65 IN | TEMPERATURE: 98.6 F

## 2021-07-23 DIAGNOSIS — M85.89 OSTEOPENIA OF MULTIPLE SITES: ICD-10-CM

## 2021-07-23 DIAGNOSIS — C50.812 MALIGNANT NEOPLASM OF OVERLAPPING SITES OF LEFT BREAST IN FEMALE, ESTROGEN RECEPTOR POSITIVE (HCC): Primary | ICD-10-CM

## 2021-07-23 DIAGNOSIS — Z17.0 MALIGNANT NEOPLASM OF OVERLAPPING SITES OF LEFT BREAST IN FEMALE, ESTROGEN RECEPTOR POSITIVE (HCC): Primary | ICD-10-CM

## 2021-07-23 DIAGNOSIS — D51.8 OTHER VITAMIN B12 DEFICIENCY ANEMIA: ICD-10-CM

## 2021-07-23 DIAGNOSIS — Z17.0 MALIGNANT NEOPLASM OF OVERLAPPING SITES OF LEFT BREAST IN FEMALE, ESTROGEN RECEPTOR POSITIVE (HCC): ICD-10-CM

## 2021-07-23 DIAGNOSIS — M85.89 OSTEOPENIA OF MULTIPLE SITES: Primary | ICD-10-CM

## 2021-07-23 DIAGNOSIS — C50.812 MALIGNANT NEOPLASM OF OVERLAPPING SITES OF LEFT BREAST IN FEMALE, ESTROGEN RECEPTOR POSITIVE (HCC): ICD-10-CM

## 2021-07-23 LAB
ALBUMIN SERPL-MCNC: 4.2 G/DL (ref 3.5–5.2)
ALBUMIN/GLOB SERPL: 1.4 G/DL (ref 1.1–2.4)
ALP SERPL-CCNC: 108 U/L (ref 38–116)
ALT SERPL W P-5'-P-CCNC: 22 U/L (ref 0–33)
ANION GAP SERPL CALCULATED.3IONS-SCNC: 10.1 MMOL/L (ref 5–15)
AST SERPL-CCNC: 16 U/L (ref 0–32)
BASOPHILS # BLD AUTO: 0.06 10*3/MM3 (ref 0–0.2)
BASOPHILS NFR BLD AUTO: 0.8 % (ref 0–1.5)
BILIRUB SERPL-MCNC: <0.2 MG/DL (ref 0.2–1.2)
BUN SERPL-MCNC: 16 MG/DL (ref 6–20)
BUN/CREAT SERPL: 27.6 (ref 7.3–30)
CALCIUM SPEC-SCNC: 9.2 MG/DL (ref 8.5–10.2)
CHLORIDE SERPL-SCNC: 103 MMOL/L (ref 98–107)
CO2 SERPL-SCNC: 25.9 MMOL/L (ref 22–29)
CREAT SERPL-MCNC: 0.58 MG/DL (ref 0.6–1.1)
DEPRECATED RDW RBC AUTO: 41 FL (ref 37–54)
EOSINOPHIL # BLD AUTO: 0.35 10*3/MM3 (ref 0–0.4)
EOSINOPHIL NFR BLD AUTO: 4.8 % (ref 0.3–6.2)
ERYTHROCYTE [DISTWIDTH] IN BLOOD BY AUTOMATED COUNT: 11.9 % (ref 12.3–15.4)
FERRITIN SERPL-MCNC: 314 NG/ML (ref 11–207)
GFR SERPL CREATININE-BSD FRML MDRD: 111 ML/MIN/1.73
GLOBULIN UR ELPH-MCNC: 3.1 GM/DL (ref 1.8–3.5)
GLUCOSE SERPL-MCNC: 83 MG/DL (ref 74–124)
HCT VFR BLD AUTO: 38.5 % (ref 34–46.6)
HGB BLD-MCNC: 12.5 G/DL (ref 12–15.9)
IMM GRANULOCYTES # BLD AUTO: 0.06 10*3/MM3 (ref 0–0.05)
IMM GRANULOCYTES NFR BLD AUTO: 0.8 % (ref 0–0.5)
IRON 24H UR-MRATE: 57 MCG/DL (ref 37–145)
IRON SATN MFR SERPL: 17 % (ref 14–48)
LYMPHOCYTES # BLD AUTO: 2.38 10*3/MM3 (ref 0.7–3.1)
LYMPHOCYTES NFR BLD AUTO: 32.8 % (ref 19.6–45.3)
MAGNESIUM SERPL-MCNC: 1.9 MG/DL (ref 1.8–2.5)
MCH RBC QN AUTO: 30.7 PG (ref 26.6–33)
MCHC RBC AUTO-ENTMCNC: 32.5 G/DL (ref 31.5–35.7)
MCV RBC AUTO: 94.6 FL (ref 79–97)
MONOCYTES # BLD AUTO: 0.46 10*3/MM3 (ref 0.1–0.9)
MONOCYTES NFR BLD AUTO: 6.3 % (ref 5–12)
NEUTROPHILS NFR BLD AUTO: 3.95 10*3/MM3 (ref 1.7–7)
NEUTROPHILS NFR BLD AUTO: 54.5 % (ref 42.7–76)
NRBC BLD AUTO-RTO: 0 /100 WBC (ref 0–0.2)
PHOSPHATE SERPL-MCNC: 3.8 MG/DL (ref 2.5–4.5)
PLATELET # BLD AUTO: 383 10*3/MM3 (ref 140–450)
PMV BLD AUTO: 9.6 FL (ref 6–12)
POTASSIUM SERPL-SCNC: 3.9 MMOL/L (ref 3.5–4.7)
PROT SERPL-MCNC: 7.3 G/DL (ref 6.3–8)
RBC # BLD AUTO: 4.07 10*6/MM3 (ref 3.77–5.28)
SODIUM SERPL-SCNC: 139 MMOL/L (ref 134–145)
TIBC SERPL-MCNC: 333 MCG/DL (ref 249–505)
TRANSFERRIN SERPL-MCNC: 238 MG/DL (ref 200–360)
VIT B12 BLD-MCNC: 1174 PG/ML (ref 211–946)
WBC # BLD AUTO: 7.26 10*3/MM3 (ref 3.4–10.8)

## 2021-07-23 PROCEDURE — 82607 VITAMIN B-12: CPT | Performed by: INTERNAL MEDICINE

## 2021-07-23 PROCEDURE — 84100 ASSAY OF PHOSPHORUS: CPT

## 2021-07-23 PROCEDURE — 83735 ASSAY OF MAGNESIUM: CPT

## 2021-07-23 PROCEDURE — 85025 COMPLETE CBC W/AUTO DIFF WBC: CPT

## 2021-07-23 PROCEDURE — 80053 COMPREHEN METABOLIC PANEL: CPT

## 2021-07-23 PROCEDURE — 83540 ASSAY OF IRON: CPT

## 2021-07-23 PROCEDURE — 99214 OFFICE O/P EST MOD 30 MIN: CPT | Performed by: INTERNAL MEDICINE

## 2021-07-23 PROCEDURE — 96372 THER/PROPH/DIAG INJ SC/IM: CPT

## 2021-07-23 PROCEDURE — 36415 COLL VENOUS BLD VENIPUNCTURE: CPT

## 2021-07-23 PROCEDURE — 82728 ASSAY OF FERRITIN: CPT

## 2021-07-23 PROCEDURE — 84466 ASSAY OF TRANSFERRIN: CPT

## 2021-07-23 PROCEDURE — 25010000002 DENOSUMAB 60 MG/ML SOLUTION PREFILLED SYRINGE: Performed by: INTERNAL MEDICINE

## 2021-07-23 RX ORDER — CIPROFLOXACIN HYDROCHLORIDE 3.5 MG/ML
SOLUTION/ DROPS TOPICAL
COMMUNITY
Start: 2021-06-15

## 2021-07-23 RX ORDER — FLUCONAZOLE 200 MG/1
200 TABLET ORAL WEEKLY
COMMUNITY
Start: 2021-07-19

## 2021-07-23 RX ADMIN — DENOSUMAB 60 MG: 60 INJECTION SUBCUTANEOUS at 17:07

## 2021-07-23 NOTE — PROGRESS NOTES
"Chief Complaint  Stage IIA ([id number]) left breast cancer ER/KY positive, HER-2/jodi negative.  Recurrent iron deficiency, osteopenia, hepatic steatosis, left upper extremity lymphedema    Subjective        History of Present Illness  Patient returns today for a follow-up visit continuing on adjuvant Arimidex 1 mg daily with plans to complete 10 years of treatment on 9/25/2013 due to her high risk disease.  She also continues on every 6-month Prolia injections, due today.  Since her last visit, the patient did undergo treatment for her recurrent iron deficiency anemia with Injectafer 750 mg IV on 1/15 and 1/22/2021.  She tolerated this well.  Is suspected that her recurrent iron deficiency is related to absorption issues related to her prior gastric bypass.  She has had no evidence of GI blood loss.  She did undergo an additional plastic surgery procedure on 2/25/2021 to excise excess axillary tissue in the lateral chest wall/axillary region on the left side with Dr. Montgomery on 2/25/2021.  She has now nearly completed her plastic surgery process with recent nipple tattoo on the left on 7/14/2021.  Patient notes that she has gained some of her weight back during the viral pandemic, having increased from 212 up to 223 pounds since last visit.  She is trying to continue on an exercise and diet program.  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  She was seen by dermatology and has recently started on treatment for onycholysis with Diflucan 200 mg weekly x6 months.  She has not experienced any recent difficulty in terms of her arthritic pain from rheumatoid arthritis.      Objective   Vital Signs:   /74   Pulse 79   Temp 98.6 °F (37 °C) (Temporal)   Resp 18   Ht 165.1 cm (65\")   Wt 102 kg (223 lb 12.8 oz)   SpO2 97%   BMI 37.24 kg/m²     Physical Exam  Constitutional:       Appearance: She is well-developed.   HENT:      Left Ear: Tympanic membrane normal.   Eyes:      " Conjunctiva/sclera: Conjunctivae normal.   Neck:      Thyroid: No thyromegaly.   Cardiovascular:      Rate and Rhythm: Normal rate and regular rhythm.      Heart sounds: No murmur heard.   No friction rub. No gallop.    Pulmonary:      Effort: No respiratory distress.      Breath sounds: Normal breath sounds.      Comments: Status post right reduction mammoplasty.  Status post left TRAM reconstruction with healed incisions.  No masses or abnormalities palpated bilaterally.  Status post recent nipple tattoo left breast  Abdominal:      General: Bowel sounds are normal. There is no distension.      Palpations: Abdomen is soft.      Tenderness: There is no abdominal tenderness.   Lymphadenopathy:      Head:      Right side of head: No submandibular adenopathy.      Cervical: No cervical adenopathy.      Upper Body:      Right upper body: No supraclavicular adenopathy.      Left upper body: No supraclavicular adenopathy.   Skin:     General: Skin is warm and dry.      Findings: No rash.      Comments: On the right thumb there is a darkened circular area in the subungual region.  This does not have the appearance of a hematoma nor of onychomycosis.   Neurological:      Mental Status: She is alert and oriented to person, place, and time.      Cranial Nerves: No cranial nerve deficit.      Motor: No abnormal muscle tone.      Deep Tendon Reflexes: Reflexes normal.   Psychiatric:         Behavior: Behavior normal.        Result Review : Reviewed CBC, CMP, magnesium, phosphorus, iron panel, ferritin, B12 level from today.  Reviewed operative note from plastic surgery procedure.       Assessment and Plan  1. Stage IIA ([id number]) left breast cancer:     · Status post left mastectomy with left axillary lymph node dissection 03/12/2013.  Lesion #1 invasive ductal carcinoma grade 2, 1.5 cm ER positive (90%), OH positive (1-4%), HER-2/jodi FISH negative (ratio 1.4),  Ki-67 score of 8%.  Lesion #2 invasive ductal carcinoma, grade  2, 1.7 cm,  ER positive (70%), VA negative (%),HER-2/jodi FISH positive (ratio of 3.0).  3 of 15 lymph nodes positive (2 macrometastases, 1 micrometastasis, largest lesion 2.5 cm replacing lymph node).  Lymph node evaluated and was ER positive (90%), VA positive (30%), HER-2/jodi FISH negative (ratio of 1.4).   · She received adjuvant dose-dense chemotherapy with AC x4 cycles followed by weekly Taxol x12 doses completed on 08/27/2013. She did receive Herceptin in conjunction with the Taxol and then completed a total of a year with last dose of Herceptin received on 05/21/2014 (3-week dose).   · She did initiate adjuvant tamoxifen on 09/25/2013 and we anticipate likely a 10-year course of treatment.   · BRCA-1 and 2 testing negative at time of diagnosis.  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  · Due to continued menorrhagia she underwent KATE/BSO on 6/5/18 with benign findings.    · DEXA scan 10/12/18 with evidence of osteopenia with maximal T score -1.4 the lumbar spine.    · Since the patient was rendered surgically postmenopausal with only mild osteopenia, she was transitioned from tamoxifen to adjuvant Arimidex on 11/21/18 with intent to complete 10 years of total adjuvant endocrine therapy (from time of initiation of tamoxifen 9/25/13).  · The patient was able to undergo reconstructive therapy in late 2020.  Dr. Montgomery performed a left TRAM reconstruction on 8/4/2020, pathology from specimen was negative for malignancy.  She had difficulty with necrosis and underwent revision procedure on 8/24/2020, again with pathology negative for malignancy but showing evidence of inflammation and fat necrosis.  She underwent subsequent right reduction mammoplasty for symmetry on 11/24/2020.  Pathology from the right breast was negative for malignancy.  · Additional plastic surgery procedure 2/25/2021 to excise excess tissue left lateral chest wall/axilla  · The patient returns today in follow-up  continuing on adjuvant Arimidex 1 mg daily.  She is tolerating Arimidex well with no significant side effects.  She has no clinical evidence of recurrent disease.  She has now completed her plastic surgery process having undergone nipple tattoo on the left just recently 7/14/2021.  She is going to be due for her annual right-sided mammogram in October and we will schedule that for her.  I will see her back in 6 months for routine follow-up when she is again due for Prolia.  We will plan a 2-year interval follow-up DEXA scan which will be due in December 2022.  2. Depression/anxiety with prior psychotic episode:   · The patient required hospitalization in the behavioral health unit in October 2015.   · She has had difficulties with anxiety and depression in the past, however, given her breast cancer diagnosis as well as stressors from her job and family, it appears that these precipitated the event.    · She has stabilized and continues on Lexapro at 20 mg per day.   3. Rheumatoid arthritis:   · The patient was experiencing diffuse arthralgias as well as pain in her wrists right greater than left.    · We did perform an evaluation 4/28/17 with negative MARYSE panel however rheumatoid factor was positive at 16.4.    · She was referred to rheumatology Dr. Lopez in May 2017.   · The patient was felt to have early autoimmune disorder/rheumatoid arthritis.    · She was started on Plaquenil.    · Symptoms have stabilized and improved over time.    · She had been continuing routine follow-up with rheumatology.  We do not have records since 2019 and I am not sure that she has been there recently.  I am not sure that she is actively taking Plaquenil at this time.  Nevertheless she is not experiencing any significant arthritic symptoms.  4. Vitamin D deficiency:   · The patient has been on and off of vitamin D supplementation in the past.    · Borderline low 25 hydroxy vitamin D level on 5/14/2019 at 28.3 and was taking vitamin D  400 units over-the-counter daily, subsequently increased dose to 800 units daily.    · On 8/6/2019, 25 hydroxy vitamin D level had improved to 34.8.  5. Anemia:   · The patient developed significant anemia with a hemoglobin down to 10.8 on 3/30/16.    · Evaluation revealed iron deficiency with a ferritin of 12.5 iron percent saturation of 5 and a TIBC 535.    · She started oral iron sulfate 325 mg twice a day and was tolerating this very well.  Her hemoglobin improved into the 12 range.    · Her iron studies showed a significant improvement previously in October 2016 with ferritin up to 54, iron saturation 16%.    · She was asked to continue on oral iron twice a day however she did not do so.    · The patient also was found to have B12 deficiency with a level of 187 on 3/30/16.  She started B12 replacement and received weekly dosing ×4 followed bimonthly injections.  As it was difficult for her to come in to the office for B12 injections, she was asked to begin oral B12 1000 µg daily however she did not do this.    · On 4/28/17, she was mildly anemic with a hemoglobin of 11.4.  She had a thrombocytosis as well, related to iron deficiency.  Repeat iron studies 4/28/17 showed a ferritin that declined to 42, iron saturation low at 7%, elevated TIBC at 491. She was asked to resume oral iron twice daily.  B12 level 4/28/17 had remained in the low normal range at 370 and was monitored.    · On 7/14/17, B12 level was again low normal at 299 if she was asked to resume oral B12 however she did not do so.    · She was intolerant of oral iron with diarrhea and abdominal cramping.    · Labs on 11/8/17 showed a declining iron saturation 9% and a slight decline in ferritin down to 39 with a TIBC at the 501.  Her hemoglobin was up to 12.8.  Oral iron was discontinued 11/8/17 and she proceeded on with Feraheme on 11/15/17 and 11/22/17.  She also resumed oral B12 daily.    · The patient had recurrent menorrhagia but underwent  KATE/BSO on 6/5/18.    · B12 level on 5/14/2019 was 1053.    · On 1/17/2020, hemoglobin declined slightly to 11.7.  We did repeat her iron studies showing ferritin of 116, iron 27, iron saturation 8%, TIBC 325.  Her anemia may be related to recent gastric bypass procedure.   · On 1/8/2021, hemoglobin was 12.9 with iron 51, ferritin 37.8, iron saturation 11%, TIBC 469, B12 799.  There had been gradual decline in her ferritin level and with development of low iron saturation.  She has been intolerant of oral iron and would likely not absorb the iron at this point due to her gastric bypass.  Therefore received Injectafer 750 mg IV on 1/15 and 1/22/2021.  · Patient does continue on multivitamin that contains small amounts of iron and B12  · Patient returns today with hemoglobin 12.5, iron studies indicating that she is iron replete with iron 57, ferritin 314, iron saturation 17%, TIBC 333.  B12 remains normal with improved level at 1174.  We will recheck iron panel, ferritin, B12, and hemoglobin in 6 months.  6. Elevated transaminases with evidence of hepatic steatosis:   · The patient has had intermittent mild elevation in transaminases.    · Viral hepatitis panel -5/14/2019.    · Hepatic ultrasound 5/28/2019 with borderline hepatomegaly with probable mild fatty infiltration with increased echotexture.    · Transaminases today are normal.  7. Left upper extremity lymphedema:   · The patient does wear her sleeve intermittently, is not wearing it today.    · Lymphedema is stable to improved.  8. Osteopenia:   · Previous DEXA scan 3/5/14 osteopenia with maximal T score of -1.1 in the lumbar spine.    · Surgically postmenopausal.    · Repeat DEXA scan in 10/12/18 with maximal T score -1.4 lumbar spine showing worsening since 2014.    · Aromatase inhibitor therapy initiated with Arimidex 1 mg daily on 11/20/18.    · One year follow-up DEXA scan 10/18/2019 showed a 5% improvement in lumbar spine with T score 0.1, 23% decrease  right femoral neck with T score -2.1, 18% improvement in left femoral neck with T score -0.3.  With decline in bone density in the right femoral neck approaching osteoporosis, recommended that we pursue treatment at this point as she continues on aromatase inhibitor therapy.  She is not a good candidate for oral Fosamax given her difficulties with gastritis and ulcer as well as gastric bypass.  Therefore initiated Prolia on 10/31/2019.    · 1 year interval follow-up DEXA scan on 12/31/2020 showed evidence of osteopenia with T score lumbar spine -0.5, left hip -0.6, and right hip -1.5.  The maximal T score in the right hip improved significantly compared to value from 2019 at which time it was -2.1.  There was a slight decline in normal bone density in the lumbar spine and left hip.  · Patient returns today in follow-up due for Prolia.  We reviewed the prior DEXA scan results which I did confirm with radiology after the visit.  With improvement in her lower score in the right hip down to very mild osteopenia at -1.5 T score, we will continue on with Prolia.  I did suggest that we repeat a DEXA scan again at a 2-year interval and if there has been further improvement consider whether she needs to continue on Prolia at that point.  9. Hypercalcemia:   · The patient had hypercalcemia on 5/14/2019 with calcium 10.8, albumin 4.6.  Intact PTH was suppressed at 12.8.  Suspected to be related to exogenous calcium supplementation.   · Patient currently continues on calcium 1200 mg daily with calcium of 9.2 today.  10. Obesity:   · The patient is status post gastric bypass at [facility] on 11/13/2019.    · The patient lost significant weight down from 296 down to plateau around 200 pounds.  · Patient has gained some weight during the viral pandemic, up to 223 pounds today but reports that she is back on an exercise program and is watching her intake.  11. Reported gastritis/gastric ulcer:   · EGD performed  prior to gastric bypass showed evidence of ulcer and gastritis.    · She was treated with Carafate and omeprazole with follow-up EGD on 10/14/2019 that showed improvement but did also show a hiatal hernia.   12. Onychomycosis:  · Patient was recently started by dermatology on treatment with Diflucan 200 mg weekly x6 months.     PLAN:     1. Proceed with Prolia today  2. Continue adjuvant Arimidex 1 mg daily  3. Continue calcium 1200 mg and vitamin D 800 units daily supplementation  4. Continue multivitamin daily which contains 500 mcg B12 as well as minimal amount of iron  5. Schedule annual right mammogram in October 2021  6. MD visit in 6 months with CBC, CMP, magnesium, phosphorus, iron panel, ferritin, B12 level.  Patient will be due for Prolia.

## 2021-09-09 RX ORDER — ANASTROZOLE 1 MG/1
TABLET ORAL
Qty: 90 TABLET | Refills: 1 | Status: SHIPPED | OUTPATIENT
Start: 2021-09-09 | End: 2022-03-11

## 2021-10-22 ENCOUNTER — APPOINTMENT (OUTPATIENT)
Dept: WOMENS IMAGING | Facility: HOSPITAL | Age: 46
End: 2021-10-22

## 2021-10-22 PROCEDURE — 77063 BREAST TOMOSYNTHESIS BI: CPT | Performed by: RADIOLOGY

## 2021-10-22 PROCEDURE — 77067 SCR MAMMO BI INCL CAD: CPT | Performed by: RADIOLOGY

## 2021-12-22 ENCOUNTER — TELEPHONE (OUTPATIENT)
Dept: ONCOLOGY | Facility: CLINIC | Age: 46
End: 2021-12-22

## 2021-12-22 NOTE — TELEPHONE ENCOUNTER
Caller: Yu Thakur    Relationship to patient: Self    Best call back number: [phone]    Type of visit: LAB, FOLLOW UP, AND INFUSION    Requested date: ANY 3:50PM OR AFTER EXCEPT TUESDAYS    If rescheduling, when is the original appointment: 01/21    Additional notes: PLEASE CALL ONCE R/S.

## 2022-01-03 ENCOUNTER — PREP FOR SURGERY (OUTPATIENT)
Dept: OTHER | Facility: HOSPITAL | Age: 47
End: 2022-01-03

## 2022-01-03 DIAGNOSIS — Z12.11 SCREENING FOR COLON CANCER: Primary | ICD-10-CM

## 2022-01-19 PROBLEM — Z12.11 SCREENING FOR COLON CANCER: Status: ACTIVE | Noted: 2022-01-19

## 2022-01-26 ENCOUNTER — TELEPHONE (OUTPATIENT)
Dept: ONCOLOGY | Facility: CLINIC | Age: 47
End: 2022-01-26

## 2022-01-26 NOTE — TELEPHONE ENCOUNTER
Caller: MODESTO    Relationship to patient: SELF    Best call back number: [phone]    Patient is needing: TO KNOW IF IT IS OK TO GET PROLIA SHOT DAY BEFORE COLONOSCOPY ON FEB 3, 2022 SINCE SHE HAS TO CLEAN OUT HER SYSTEM?

## 2022-01-27 ENCOUNTER — TELEPHONE (OUTPATIENT)
Dept: ONCOLOGY | Facility: CLINIC | Age: 47
End: 2022-01-27

## 2022-01-27 NOTE — TELEPHONE ENCOUNTER
Returned patient's call regarding Prolia injection. Reviewed with JUAN DANIEL Layton, who said there is no contraindication for patient to get Prolia prior to her colonoscopy. Patient v/u.

## 2022-02-01 NOTE — PROGRESS NOTES
"Chief Complaint  Stage IIA ([id number]) left breast cancer ER/HI positive, HER-2/jodi negative.  Recurrent iron deficiency, osteopenia, hepatic steatosis, left upper extremity lymphedema    Subjective        History of Present Illness  Patient returns today for a follow-up visit continuing on adjuvant Arimidex 1 mg daily with plans to complete 10 years of total endocrine therapy in September 2023 due to her high risk disease.  She also continues on every 6-month Prolia injections, due today.  The patient reports that she had been doing reasonably well after her last visit.  She did undergo her right-sided mammogram 10/22/2021 which was negative, BI-RADS 1.  She has also been receiving Diflucan 200 mg weekly for onychomycosis involving her right great toe.  She unfortunately contracted COVID-19 in early January testing positive on 1/4/2022.  She developed a sore throat, heaviness in her chest and mild dyspnea along with severe fatigue.  She did not lose her taste or smell.  She did receive a Medrol Dosepak.  She has recovered from the respiratory symptoms and sore throat however the fatigue has persisted.  She has seen some gradual improvement over time however does continue with fairly significant fatigue.  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  She does try to do [employer] classes.  She had lost a significant amount of weight with her COVID-19 infection however has regained most of this.      Objective   Vital Signs:   /80   Pulse 68   Temp 98.5 °F (36.9 °C) (Temporal)   Resp 17   Ht 165.1 cm (65\")   Wt 96.3 kg (212 lb 6.4 oz)   SpO2 97%   BMI 35.35 kg/m²     Physical Exam  Constitutional:       Appearance: She is well-developed.   HENT:      Left Ear: Tympanic membrane normal.   Eyes:      Conjunctiva/sclera: Conjunctivae normal.   Neck:      Thyroid: No thyromegaly.   Cardiovascular:      Rate and Rhythm: Normal rate and regular rhythm.      Heart " sounds: No murmur heard.  No friction rub. No gallop.    Pulmonary:      Effort: No respiratory distress.      Breath sounds: Normal breath sounds.      Comments: Status post right reduction mammoplasty.  Status post left TRAM reconstruction with healed incisions.  No masses or abnormalities palpated bilaterally.  Status post recent nipple tattoo left breast  Chest:   Breasts:      Right: No supraclavicular adenopathy.      Left: No supraclavicular adenopathy.       Abdominal:      General: Bowel sounds are normal. There is no distension.      Palpations: Abdomen is soft.      Tenderness: There is no abdominal tenderness.   Lymphadenopathy:      Head:      Right side of head: No submandibular adenopathy.      Cervical: No cervical adenopathy.      Upper Body:      Right upper body: No supraclavicular adenopathy.      Left upper body: No supraclavicular adenopathy.   Skin:     General: Skin is warm and dry.      Findings: No rash.      Comments: On the right thumb, the subungual area darkened pigmentation has essentially resolved.   Neurological:      Mental Status: She is alert and oriented to person, place, and time.      Cranial Nerves: No cranial nerve deficit.      Motor: No abnormal muscle tone.      Deep Tendon Reflexes: Reflexes normal.   Psychiatric:         Behavior: Behavior normal.        Result Review : Reviewed CBC, CMP, magnesium, phosphorus, iron panel, ferritin, B12 from today.  Reviewed mammogram report from 10/22/2021.       Assessment and Plan  1. Stage IIA ([id number]) left breast cancer:     · Status post left mastectomy with left axillary lymph node dissection 03/12/2013.  Lesion #1 invasive ductal carcinoma grade 2, 1.5 cm ER positive (90%), NH positive (1-4%), HER-2/jodi FISH negative (ratio 1.4),  Ki-67 score of 8%.  Lesion #2 invasive ductal carcinoma, grade 2, 1.7 cm,  ER positive (70%), NH negative (%),HER-2/jodi FISH positive (ratio of 3.0).  3 of 15 lymph nodes positive (2  macrometastases, 1 micrometastasis, largest lesion 2.5 cm replacing lymph node).  Lymph node evaluated and was ER positive (90%), MS positive (30%), HER-2/jodi FISH negative (ratio of 1.4).   · She received adjuvant dose-dense chemotherapy with AC x4 cycles followed by weekly Taxol x12 doses completed on 08/27/2013. She did receive Herceptin in conjunction with the Taxol and then completed a total of a year with last dose of Herceptin received on 05/21/2014 (3-week dose).   · She did initiate adjuvant tamoxifen on 09/25/2013 and we anticipate likely a 10-year course of treatment.   · BRCA-1 and 2 testing negative at time of diagnosis.  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  · Due to continued menorrhagia she underwent KATE/BSO on 6/5/18 with benign findings.    · DEXA scan 10/12/18 with evidence of osteopenia with maximal T score -1.4 the lumbar spine.    · Since the patient was rendered surgically postmenopausal with only mild osteopenia, she was transitioned from tamoxifen to adjuvant Arimidex on 11/21/18 with intent to complete 10 years of total adjuvant endocrine therapy (from time of initiation of tamoxifen 9/25/13).  · The patient was able to undergo reconstructive therapy in late 2020.  Dr. Montgomery performed a left TRAM reconstruction on 8/4/2020, pathology from specimen was negative for malignancy.  She had difficulty with necrosis and underwent revision procedure on 8/24/2020, again with pathology negative for malignancy but showing evidence of inflammation and fat necrosis.  She underwent subsequent right reduction mammoplasty for symmetry on 11/24/2020.  Pathology from the right breast was negative for malignancy.  · Additional plastic surgery procedure 2/25/2021 to excise excess tissue left lateral chest wall/axilla  · The patient returns today in follow-up continuing on adjuvant Arimidex 1 mg daily.  She continues to tolerate Arimidex without significant side effects.  She continues  with no clinical evidence of recurrent disease.  Her exam today is negative.  She did undergo right-sided annual mammogram on 10/22/2021 which was negative, BI-RADS 1.  I will see her back in 6 months for further follow-up.   2. Depression/anxiety with prior psychotic episode:   · The patient required hospitalization in the behavioral health unit in October 2015.   · She has had difficulties with anxiety and depression in the past, however, given her breast cancer diagnosis as well as stressors from her job and family, it appears that these precipitated the event.    · She has stabilized and continues on Lexapro at 20 mg per day.   3. Rheumatoid arthritis:   · The patient was experiencing diffuse arthralgias as well as pain in her wrists right greater than left.    · We did perform an evaluation 4/28/17 with negative MARYSE panel however rheumatoid factor was positive at 16.4.    · She was referred to rheumatology Dr. Lopez in May 2017.   · The patient was felt to have early autoimmune disorder/rheumatoid arthritis.    · She was started on Plaquenil.    · Symptoms have stabilized and improved over time.    · Patient discontinued Plaquenil on her own sometime in 2021  · Today patient reports that she has not experienced any recurrence of her arthralgias remains off of Plaquenil.  She has not followed up with rheumatology recently.  4. Vitamin D deficiency:   · The patient has been on and off of vitamin D supplementation in the past.    · Borderline low 25 hydroxy vitamin D level on 5/14/2019 at 28.3 and was taking vitamin D 400 units over-the-counter daily, subsequently increased dose to 800 units daily.    · On 8/6/2019, 25 hydroxy vitamin D level had improved to 34.8.  5. Anemia:   · The patient developed significant anemia with a hemoglobin down to 10.8 on 3/30/16.    · Evaluation revealed iron deficiency with a ferritin of 12.5 iron percent saturation of 5 and a TIBC 535.    · She started oral iron sulfate 325 mg  twice a day and was tolerating this very well.  Her hemoglobin improved into the 12 range.    · Her iron studies showed a significant improvement previously in October 2016 with ferritin up to 54, iron saturation 16%.    · She was asked to continue on oral iron twice a day however she did not do so.    · The patient also was found to have B12 deficiency with a level of 187 on 3/30/16.  She started B12 replacement and received weekly dosing ×4 followed bimonthly injections.  As it was difficult for her to come in to the office for B12 injections, she was asked to begin oral B12 1000 µg daily however she did not do this.    · On 4/28/17, she was mildly anemic with a hemoglobin of 11.4.  She had a thrombocytosis as well, related to iron deficiency.  Repeat iron studies 4/28/17 showed a ferritin that declined to 42, iron saturation low at 7%, elevated TIBC at 491. She was asked to resume oral iron twice daily.  B12 level 4/28/17 had remained in the low normal range at 370 and was monitored.    · On 7/14/17, B12 level was again low normal at 299 if she was asked to resume oral B12 however she did not do so.    · She was intolerant of oral iron with diarrhea and abdominal cramping.    · Labs on 11/8/17 showed a declining iron saturation 9% and a slight decline in ferritin down to 39 with a TIBC at the 501.  Her hemoglobin was up to 12.8.  Oral iron was discontinued 11/8/17 and she proceeded on with Feraheme on 11/15/17 and 11/22/17.  She also resumed oral B12 daily.    · The patient had recurrent menorrhagia but underwent KATE/BSO on 6/5/18.    · B12 level on 5/14/2019 was 1053.    · On 1/17/2020, hemoglobin declined slightly to 11.7.  We did repeat her iron studies showing ferritin of 116, iron 27, iron saturation 8%, TIBC 325.  Her anemia may be related to recent gastric bypass procedure.   · On 1/8/2021, hemoglobin was 12.9 with iron 51, ferritin 37.8, iron saturation 11%, TIBC 469, B12 799.  There had been gradual  decline in her ferritin level and with development of low iron saturation.  She has been intolerant of oral iron and would likely not absorb the iron at this point due to her gastric bypass.  Therefore received Injectafer 750 mg IV on 1/15 and 1/22/2021.  · Patient does continue on multivitamin that contains small amounts of iron and B12  · Patient returns today with hemoglobin 12.8, iron studies reveal iron replete status with iron 55, ferritin 216, iron saturation 15%, TIBC 374.  B12 normal at 436.  Recheck in 6 months.  6. Elevated transaminases with evidence of hepatic steatosis:   · The patient has had intermittent mild elevation in transaminases.    · Viral hepatitis panel -5/14/2019.    · Hepatic ultrasound 5/28/2019 with borderline hepatomegaly with probable mild fatty infiltration with increased echotexture.    · LFTs remain normal currently  7. Left upper extremity lymphedema:   · The patient does wear her sleeve intermittently, is not wearing it today.    · Lymphedema is stable to improved.  8. Osteopenia:   · Previous DEXA scan 3/5/14 osteopenia with maximal T score of -1.1 in the lumbar spine.    · Surgically postmenopausal.    · Repeat DEXA scan in 10/12/18 with maximal T score -1.4 lumbar spine showing worsening since 2014.    · Aromatase inhibitor therapy initiated with Arimidex 1 mg daily on 11/20/18.    · One year follow-up DEXA scan 10/18/2019 showed a 5% improvement in lumbar spine with T score 0.1, 23% decrease right femoral neck with T score -2.1, 18% improvement in left femoral neck with T score -0.3.  With decline in bone density in the right femoral neck approaching osteoporosis, recommended that we pursue treatment at this point as she continues on aromatase inhibitor therapy.  She is not a good candidate for oral Fosamax given her difficulties with gastritis and ulcer as well as gastric bypass.  Therefore initiated Prolia on 10/31/2019.    · 1 year interval follow-up DEXA scan on  12/31/2020 showed evidence of osteopenia with T score lumbar spine -0.5, left hip -0.6, and right hip -1.5.  The maximal T score in the right hip improved significantly compared to value from 2019 at which time it was -2.1.  There was a slight decline in normal bone density in the lumbar spine and left hip.  · Plan to continue Prolia and repeat 2-year interval follow-up DEXA scan in December 2022.  If further improvement, consider whether patient could stop Prolia.  · Patient returns today in follow-up due for Prolia.  We will proceed as planned  9. Hypercalcemia:   · The patient had hypercalcemia on 5/14/2019 with calcium 10.8, albumin 4.6.  Intact PTH was suppressed at 12.8.  Suspected to be related to exogenous calcium supplementation.   · Patient currently continues on calcium 1200 mg daily with calcium of 9.4 today.  10. Obesity:   · The patient is status post gastric bypass at [facility] in [place] on 11/13/2019.    · The patient lost significant weight down from 296 down to plateau around 200 pounds.  · Patient reports that her weight has fluctuated some recently, did decline after COVID-19 infection however she has regained the weight, currently 212 pounds.  11. Reported gastritis/gastric ulcer:   · EGD performed prior to gastric bypass showed evidence of ulcer and gastritis.    · She was treated with Carafate and omeprazole with follow-up EGD on 10/14/2019 that showed improvement but did also show a hiatal hernia.   12. Onychomycosis:  · Patient continuing on treatment with Diflucan 200 mg weekly x6 months per dermatology.     PLAN:     1. Proceed with Prolia today  2. Continue adjuvant Arimidex 1 mg daily  3. Continue calcium 1200 mg and vitamin D 800 units daily supplementation  4. Continue multivitamin daily which contains 500 mcg B12 as well as minimal amount of iron  5. MD visit in 6 months with CBC, CMP, magnesium, phosphorus, iron panel, ferritin, B12 level.    Patient will be due for Prolia.

## 2022-02-02 ENCOUNTER — LAB (OUTPATIENT)
Dept: LAB | Facility: HOSPITAL | Age: 47
End: 2022-02-02

## 2022-02-02 ENCOUNTER — INFUSION (OUTPATIENT)
Dept: ONCOLOGY | Facility: HOSPITAL | Age: 47
End: 2022-02-02

## 2022-02-02 ENCOUNTER — OFFICE VISIT (OUTPATIENT)
Dept: ONCOLOGY | Facility: CLINIC | Age: 47
End: 2022-02-02

## 2022-02-02 VITALS
OXYGEN SATURATION: 97 % | WEIGHT: 212.4 LBS | HEART RATE: 68 BPM | SYSTOLIC BLOOD PRESSURE: 117 MMHG | RESPIRATION RATE: 17 BRPM | DIASTOLIC BLOOD PRESSURE: 80 MMHG | HEIGHT: 65 IN | TEMPERATURE: 98.5 F | BODY MASS INDEX: 35.39 KG/M2

## 2022-02-02 DIAGNOSIS — M85.89 OSTEOPENIA OF MULTIPLE SITES: ICD-10-CM

## 2022-02-02 DIAGNOSIS — D50.8 OTHER IRON DEFICIENCY ANEMIA: ICD-10-CM

## 2022-02-02 DIAGNOSIS — M85.89 OSTEOPENIA OF MULTIPLE SITES: Primary | ICD-10-CM

## 2022-02-02 DIAGNOSIS — Z17.0 MALIGNANT NEOPLASM OF OVERLAPPING SITES OF LEFT BREAST IN FEMALE, ESTROGEN RECEPTOR POSITIVE: Primary | ICD-10-CM

## 2022-02-02 DIAGNOSIS — C50.812 MALIGNANT NEOPLASM OF OVERLAPPING SITES OF LEFT BREAST IN FEMALE, ESTROGEN RECEPTOR POSITIVE: Primary | ICD-10-CM

## 2022-02-02 DIAGNOSIS — D51.8 OTHER VITAMIN B12 DEFICIENCY ANEMIA: ICD-10-CM

## 2022-02-02 DIAGNOSIS — C50.812 MALIGNANT NEOPLASM OF OVERLAPPING SITES OF LEFT BREAST IN FEMALE, ESTROGEN RECEPTOR POSITIVE: ICD-10-CM

## 2022-02-02 DIAGNOSIS — Z17.0 MALIGNANT NEOPLASM OF OVERLAPPING SITES OF LEFT BREAST IN FEMALE, ESTROGEN RECEPTOR POSITIVE: ICD-10-CM

## 2022-02-02 LAB
ALBUMIN SERPL-MCNC: 4.5 G/DL (ref 3.5–5.2)
ALBUMIN/GLOB SERPL: 1.7 G/DL (ref 1.1–2.4)
ALP SERPL-CCNC: 107 U/L (ref 38–116)
ALT SERPL W P-5'-P-CCNC: 17 U/L (ref 0–33)
ANION GAP SERPL CALCULATED.3IONS-SCNC: 14.2 MMOL/L (ref 5–15)
AST SERPL-CCNC: 15 U/L (ref 0–32)
BASOPHILS # BLD AUTO: 0.06 10*3/MM3 (ref 0–0.2)
BASOPHILS NFR BLD AUTO: 0.8 % (ref 0–1.5)
BILIRUB SERPL-MCNC: <0.2 MG/DL (ref 0.2–1.2)
BUN SERPL-MCNC: 13 MG/DL (ref 6–20)
BUN/CREAT SERPL: 21.3 (ref 7.3–30)
CALCIUM SPEC-SCNC: 9.4 MG/DL (ref 8.5–10.2)
CHLORIDE SERPL-SCNC: 104 MMOL/L (ref 98–107)
CO2 SERPL-SCNC: 23.8 MMOL/L (ref 22–29)
CREAT SERPL-MCNC: 0.61 MG/DL (ref 0.6–1.1)
DEPRECATED RDW RBC AUTO: 43.3 FL (ref 37–54)
EOSINOPHIL # BLD AUTO: 0.46 10*3/MM3 (ref 0–0.4)
EOSINOPHIL NFR BLD AUTO: 6 % (ref 0.3–6.2)
ERYTHROCYTE [DISTWIDTH] IN BLOOD BY AUTOMATED COUNT: 12.6 % (ref 12.3–15.4)
FERRITIN SERPL-MCNC: 216.4 NG/ML (ref 11–207)
GFR SERPL CREATININE-BSD FRML MDRD: 105 ML/MIN/1.73
GLOBULIN UR ELPH-MCNC: 2.7 GM/DL (ref 1.8–3.5)
GLUCOSE SERPL-MCNC: 114 MG/DL (ref 74–124)
HCT VFR BLD AUTO: 40.7 % (ref 34–46.6)
HGB BLD-MCNC: 12.8 G/DL (ref 12–15.9)
IMM GRANULOCYTES # BLD AUTO: 0.03 10*3/MM3 (ref 0–0.05)
IMM GRANULOCYTES NFR BLD AUTO: 0.4 % (ref 0–0.5)
IRON 24H UR-MRATE: 55 MCG/DL (ref 37–145)
IRON SATN MFR SERPL: 15 % (ref 14–48)
LYMPHOCYTES # BLD AUTO: 2.69 10*3/MM3 (ref 0.7–3.1)
LYMPHOCYTES NFR BLD AUTO: 34.8 % (ref 19.6–45.3)
MAGNESIUM SERPL-MCNC: 2 MG/DL (ref 1.8–2.5)
MCH RBC QN AUTO: 29.4 PG (ref 26.6–33)
MCHC RBC AUTO-ENTMCNC: 31.4 G/DL (ref 31.5–35.7)
MCV RBC AUTO: 93.6 FL (ref 79–97)
MONOCYTES # BLD AUTO: 0.42 10*3/MM3 (ref 0.1–0.9)
MONOCYTES NFR BLD AUTO: 5.4 % (ref 5–12)
NEUTROPHILS NFR BLD AUTO: 4.06 10*3/MM3 (ref 1.7–7)
NEUTROPHILS NFR BLD AUTO: 52.6 % (ref 42.7–76)
NRBC BLD AUTO-RTO: 0 /100 WBC (ref 0–0.2)
PHOSPHATE SERPL-MCNC: 3.9 MG/DL (ref 2.5–4.5)
PLATELET # BLD AUTO: 350 10*3/MM3 (ref 140–450)
PMV BLD AUTO: 10 FL (ref 6–12)
POTASSIUM SERPL-SCNC: 3.8 MMOL/L (ref 3.5–4.7)
PROT SERPL-MCNC: 7.2 G/DL (ref 6.3–8)
RBC # BLD AUTO: 4.35 10*6/MM3 (ref 3.77–5.28)
SODIUM SERPL-SCNC: 142 MMOL/L (ref 134–145)
TIBC SERPL-MCNC: 374 MCG/DL (ref 249–505)
TRANSFERRIN SERPL-MCNC: 267 MG/DL (ref 200–360)
VIT B12 BLD-MCNC: 436 PG/ML (ref 211–946)
WBC NRBC COR # BLD: 7.72 10*3/MM3 (ref 3.4–10.8)

## 2022-02-02 PROCEDURE — 96372 THER/PROPH/DIAG INJ SC/IM: CPT

## 2022-02-02 PROCEDURE — 84466 ASSAY OF TRANSFERRIN: CPT

## 2022-02-02 PROCEDURE — 36415 COLL VENOUS BLD VENIPUNCTURE: CPT

## 2022-02-02 PROCEDURE — 84100 ASSAY OF PHOSPHORUS: CPT

## 2022-02-02 PROCEDURE — 83540 ASSAY OF IRON: CPT

## 2022-02-02 PROCEDURE — 85025 COMPLETE CBC W/AUTO DIFF WBC: CPT

## 2022-02-02 PROCEDURE — 25010000002 DENOSUMAB 60 MG/ML SOLUTION PREFILLED SYRINGE: Performed by: INTERNAL MEDICINE

## 2022-02-02 PROCEDURE — 82607 VITAMIN B-12: CPT | Performed by: INTERNAL MEDICINE

## 2022-02-02 PROCEDURE — 80053 COMPREHEN METABOLIC PANEL: CPT

## 2022-02-02 PROCEDURE — 82728 ASSAY OF FERRITIN: CPT

## 2022-02-02 PROCEDURE — 99214 OFFICE O/P EST MOD 30 MIN: CPT | Performed by: INTERNAL MEDICINE

## 2022-02-02 PROCEDURE — 83735 ASSAY OF MAGNESIUM: CPT

## 2022-02-02 RX ADMIN — DENOSUMAB 60 MG: 60 INJECTION SUBCUTANEOUS at 16:46

## 2022-02-10 ENCOUNTER — TELEPHONE (OUTPATIENT)
Dept: SURGERY | Facility: CLINIC | Age: 47
End: 2022-02-10

## 2022-02-10 NOTE — TELEPHONE ENCOUNTER
Left a voicemail for the patient to call back to confirm her new arrival time. She is to now arrive at 7:30am on 2/17/22 for her colonoscopy with Dr. Shaffer.

## 2022-02-17 ENCOUNTER — ANESTHESIA (OUTPATIENT)
Dept: GASTROENTEROLOGY | Facility: HOSPITAL | Age: 47
End: 2022-02-17

## 2022-02-17 ENCOUNTER — ANESTHESIA EVENT (OUTPATIENT)
Dept: GASTROENTEROLOGY | Facility: HOSPITAL | Age: 47
End: 2022-02-17

## 2022-02-17 ENCOUNTER — HOSPITAL ENCOUNTER (OUTPATIENT)
Facility: HOSPITAL | Age: 47
Setting detail: HOSPITAL OUTPATIENT SURGERY
Discharge: HOME | End: 2022-02-17
Attending: SURGERY | Admitting: SURGERY

## 2022-02-17 VITALS
HEART RATE: 76 BPM | OXYGEN SATURATION: 99 % | RESPIRATION RATE: 16 BRPM | SYSTOLIC BLOOD PRESSURE: 145 MMHG | BODY MASS INDEX: 34.66 KG/M2 | TEMPERATURE: 97.8 F | HEIGHT: 65 IN | DIASTOLIC BLOOD PRESSURE: 82 MMHG | WEIGHT: 208 LBS

## 2022-02-17 PROCEDURE — 25010000002 PROPOFOL 10 MG/ML EMULSION: Performed by: ANESTHESIOLOGY

## 2022-02-17 PROCEDURE — 45378 DIAGNOSTIC COLONOSCOPY: CPT | Performed by: SURGERY

## 2022-02-17 PROCEDURE — S0260 H&P FOR SURGERY: HCPCS | Performed by: SURGERY

## 2022-02-17 RX ORDER — SODIUM CHLORIDE, SODIUM LACTATE, POTASSIUM CHLORIDE, CALCIUM CHLORIDE 600; 310; 30; 20 MG/100ML; MG/100ML; MG/100ML; MG/100ML
INJECTION, SOLUTION INTRAVENOUS CONTINUOUS PRN
Status: DISCONTINUED | OUTPATIENT
Start: 2022-02-17 | End: 2022-02-17 | Stop reason: SURG

## 2022-02-17 RX ORDER — PROPOFOL 10 MG/ML
VIAL (ML) INTRAVENOUS AS NEEDED
Status: DISCONTINUED | OUTPATIENT
Start: 2022-02-17 | End: 2022-02-17 | Stop reason: SURG

## 2022-02-17 RX ORDER — SODIUM CHLORIDE, SODIUM LACTATE, POTASSIUM CHLORIDE, CALCIUM CHLORIDE 600; 310; 30; 20 MG/100ML; MG/100ML; MG/100ML; MG/100ML
30 INJECTION, SOLUTION INTRAVENOUS CONTINUOUS PRN
Status: DISCONTINUED | OUTPATIENT
Start: 2022-02-17 | End: 2022-02-17 | Stop reason: HOSPADM

## 2022-02-17 RX ORDER — PROPOFOL 10 MG/ML
VIAL (ML) INTRAVENOUS CONTINUOUS PRN
Status: DISCONTINUED | OUTPATIENT
Start: 2022-02-17 | End: 2022-02-17 | Stop reason: SURG

## 2022-02-17 RX ORDER — LIDOCAINE HYDROCHLORIDE 20 MG/ML
INJECTION, SOLUTION INFILTRATION; PERINEURAL AS NEEDED
Status: DISCONTINUED | OUTPATIENT
Start: 2022-02-17 | End: 2022-02-17 | Stop reason: SURG

## 2022-02-17 RX ADMIN — LIDOCAINE HYDROCHLORIDE 60 MG: 20 INJECTION, SOLUTION INFILTRATION; PERINEURAL at 08:49

## 2022-02-17 RX ADMIN — SODIUM CHLORIDE, POTASSIUM CHLORIDE, SODIUM LACTATE AND CALCIUM CHLORIDE: 600; 310; 30; 20 INJECTION, SOLUTION INTRAVENOUS at 08:46

## 2022-02-17 RX ADMIN — SODIUM CHLORIDE, POTASSIUM CHLORIDE, SODIUM LACTATE AND CALCIUM CHLORIDE 30 ML/HR: 600; 310; 30; 20 INJECTION, SOLUTION INTRAVENOUS at 08:15

## 2022-02-17 RX ADMIN — PROPOFOL 100 MG: 10 INJECTION, EMULSION INTRAVENOUS at 08:50

## 2022-02-17 RX ADMIN — Medication 200 MCG/KG/MIN: at 08:50

## 2022-02-17 NOTE — ANESTHESIA PREPROCEDURE EVALUATION
Anesthesia Evaluation     NPO Solid Status: > 8 hours  NPO Liquid Status: > 2 hours           Airway   Mallampati: II  TM distance: >3 FB  Neck ROM: full  Dental - normal exam     Pulmonary - normal exam   (+) sleep apnea on CPAP,   Cardiovascular - normal exam  Exercise tolerance: good (4-7 METS)    ECG reviewed    (+) hypertension (no meds after gastrc bypass),       Neuro/Psych  (+) numbness, psychiatric history Anxiety and Depression,    GI/Hepatic/Renal/Endo    (+) obesity,       Musculoskeletal     Abdominal    Substance History      OB/GYN          Other   arthritis, autoimmune disease rheumatoid arthritis,    history of cancer (L breast s/p lymph node dissection) remission                    Anesthesia Plan    ASA 2     MAC     intravenous induction     Anesthetic plan, all risks, benefits, and alternatives have been provided, discussed and informed consent has been obtained with: patient.        CODE STATUS:

## 2022-02-17 NOTE — ANESTHESIA POSTPROCEDURE EVALUATION
"Patient: Yu Thakur    Procedure Summary     Date: 02/17/22 Room / Location: [facility] ENDOSCOPY 1 /  [facility]    Anesthesia Start: 0846 Anesthesia Stop: 0914    Procedure: COLONOSCOPY to cecum (N/A ) Diagnosis:       Screening for colon cancer      (Screening for colon cancer [Z12.11])    Surgeons: Praveena Shaffer MD Provider: Farzaneh Pelayo MD    Anesthesia Type: MAC ASA Status: 2          Anesthesia Type: MAC    Vitals  Vitals Value Taken Time   /65 02/17/22 0923   Temp     Pulse 54 02/17/22 0923   Resp 16 02/17/22 0923   SpO2 95 % 02/17/22 0923           Post Anesthesia Care and Evaluation    Patient location during evaluation: bedside  Patient participation: complete - patient participated  Level of consciousness: awake  Pain management: adequate  Airway patency: patent  Anesthetic complications: No anesthetic complications    Cardiovascular status: acceptable  Respiratory status: acceptable  Hydration status: acceptable    Comments: /65 (BP Location: Left arm, Patient Position: Lying)   Pulse 54   Temp 36.6 °C (97.8 °F) (Oral)   Resp 16   Ht 165.1 cm (65\")   Wt 94.3 kg (208 lb)   LMP 05/26/2018   SpO2 95%   BMI 34.61 kg/m²       "

## 2022-02-17 NOTE — OP NOTE
Operative Note :  Praveena Shaffer MD      Yu Gilles  1975    Procedure Date: 02/17/22    Pre-op Diagnosis:  Screening for colon cancer [Z12.11]    Post-Operative Diagnosis:  Normal colon    Procedure:   Flexible colonoscopy to the cecum    Surgeon: Praveena Shaffer MD    Assistant: None    Anesthesia:  MAC (monitored anesthetic care)    Estimated Blood Loss: Minimal    Specimens: None    Complications: None    Indications:  Mrs. Thakur is a 46-year-old lady here for a routine screening colonoscopy.  She has been counseled on the risks of the procedure to include bleeding, possible colon perforation, and possible missed pathology.  Despite these risks, she has consented to proceed.    Findings: Normal colon    Description of procedure:  The patient was brought to the endoscopy suite and kb in the left lateral decubitus position.  Continuous propofol anesthesia was administered.  A surgical timeout was completed.  A digital rectal exam was performed, revealing no abnormalities.  An adult colonoscope was then inserted through the anus and passed under direct visualization to the level of the cecum.  The cecum was identified via the ileocecal valve as well as the appendiceal orifice.  The scope was then slowly withdrawn, examining all circumferential walls of the ascending, transverse, descending, and sigmoid colon.  There were no abnormalities, in particular no colon polyps or diverticulosis throughout the colon.  Within the rectum the scope was retroflexed demonstrating no evidence of hemorrhoidal disease.  The scope was then withdrawn and the colon desufflated.  The patient had a very good bowel prep and was transferred to the recovery area in stable condition.     Recommendations:  Repeat colonoscopy for ongoing screening in 10 years.    Praveena Shaffer MD  General, Robotic, and Endoscopic Surgery  [facility]    [address], [address]  [place] [address]  P:  [phone]  F: [phone]

## 2022-02-17 NOTE — H&P
General Surgery  History and Physical    CC: Screening for colon cancer    HPI: The patient is a pleasant 46 y.o. year-old lady who presents today for a routine screening colonoscopy.  She denies any melena or hematochezia and has no family history of colon cancer.  She has never previously undergone colonoscopic evaluation.    Past Medical History:   History of breast cancer (left, mastectomy)  History of bladder infections  Hypertension  Rheumatoid arthritis  Type 2 diabetes  Anemia  Anxiety with depression  Obstructive sleep apnea    Past Surgical History:   Mastectomy with contralateral breast reduction for symmetry   section  D&C with hysteroscopy  Hysterectomy  Right knee surgery  Laparoscopic gastric bypass  Port placement with removal  TRAM flap reconstruction after mastectomy    Medications:   Medications Prior to Admission   Medication Sig Dispense Refill Last Dose   • anastrozole (ARIMIDEX) 1 MG tablet TAKE ONE TABLET BY MOUTH DAILY 90 tablet 1 2022 at Unknown time   • ciprofloxacin (CILOXAN) 0.3 % ophthalmic solution    Past Week at Unknown time   • escitalopram (LEXAPRO) 20 MG tablet Take 20 mg by mouth Every Night.   2022 at Unknown time   • fluconazole (DIFLUCAN) 200 MG tablet Take 200 mg by mouth 1 (One) Time Per Week.   2/3/2022   • LORazepam (ATIVAN) 1 MG tablet Take 1 mg by mouth Every Night.   2022 at Unknown time   • Multiple Vitamins-Minerals (OPURITY PO) Take 1 tablet by mouth Every Night.   2022 at Unknown time   • triamcinolone (KENALOG) 0.1 % ointment    More than a month at Unknown time       Allergies: Adhesive tape, shellfish, shrimp    Family History: Maternal grandmother with history of breast cancer, maternal grandfather with history of prostate cancer, no family history of colon cancer    Social History: , non-smoker, occasional alcohol use, no illicit drug use    ROS: A comprehensive review of systems was conducted and negative for melena or  hematochezia  All other systems reviewed and negative    Physical Exam:  Vitals:    02/17/22 0804   BP: 126/90   Pulse: 71   Resp: 16   Temp: 97.8 °F (36.6 °C)   SpO2: 97%     Height: 165 cm  Weight: 94 kg  BMI: 34  General: No acute distress, well-nourished & well-developed  HEAD: normocephalic, atraumatic  EYES: normal conjunctiva, sclera anicteric  EARS: grossly normal hearing  NECK: supple, no thyromegaly  CARDIOVASCULAR: regular rate and rhythm  RESPIRATORY: clear to auscultation bilaterally  GASTROINTESTINAL: soft, nontender, non-distended  PSYCHIATRIC: oriented x3, normal mood and affect    ASSESSMENT & PLAN  Mrs. Thakur is a 46-year-old lady here for a routine screening colonoscopy.  She has been counseled on the risks of the procedure to include bleeding, possible colon perforation, and possible missed pathology.  Despite these risks, she has consented to proceed.    Praveena Shaffer MD  General, Robotic, and Endoscopic Surgery  [facility]    [address], [address]  [place] [address]  P: [phone]  F: [phone]

## 2022-03-11 RX ORDER — ANASTROZOLE 1 MG/1
TABLET ORAL
Qty: 90 TABLET | Refills: 1 | Status: SHIPPED | OUTPATIENT
Start: 2022-03-11 | End: 2022-06-28

## 2022-06-28 RX ORDER — ANASTROZOLE 1 MG/1
TABLET ORAL
Qty: 90 TABLET | Refills: 1 | Status: SHIPPED | OUTPATIENT
Start: 2022-06-28

## 2022-08-09 ENCOUNTER — APPOINTMENT (OUTPATIENT)
Dept: ONCOLOGY | Facility: HOSPITAL | Age: 47
End: 2022-08-09

## 2022-08-09 ENCOUNTER — APPOINTMENT (OUTPATIENT)
Dept: LAB | Facility: HOSPITAL | Age: 47
End: 2022-08-09

## 2022-08-09 NOTE — PROGRESS NOTES
"Chief Complaint  Stage IIA ([id number]) left breast cancer ER/ME positive, HER-2/jodi negative.  Recurrent iron deficiency, osteopenia, hepatic steatosis, left upper extremity lymphedema    Subjective        History of Present Illness  Patient returns today for a follow-up visit continuing on adjuvant Arimidex 1 mg daily with plans to complete 10 years of total endocrine therapy in September 2023 due to her high risk disease.  She also continues on every 6-month Prolia injections, due today.  The patient today reports that she continues to do quite well overall.  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  She is somewhat fatigued at the end of the day today.  She has remained very active, participates in [employer].  She has gained some weight, up to 228 pounds today.  She notes that she has not been taking her specific calcium and vitamin D supplement however has been taking a multivitamin once daily.      Objective   Vital Signs:   /85   Pulse 100   Temp 97.1 °F (36.2 °C) (Temporal)   Resp 17   Ht 165.1 cm (65\")   Wt 104 kg (228 lb 14.4 oz)   SpO2 96%   BMI 38.09 kg/m²     Physical Exam  Constitutional:       Appearance: She is well-developed.   HENT:      Left Ear: Tympanic membrane normal.   Eyes:      Conjunctiva/sclera: Conjunctivae normal.   Neck:      Thyroid: No thyromegaly.   Cardiovascular:      Rate and Rhythm: Normal rate and regular rhythm.      Heart sounds: No murmur heard.    No friction rub. No gallop.   Pulmonary:      Effort: No respiratory distress.      Breath sounds: Normal breath sounds.      Comments: Status post right reduction mammoplasty.  Status post left TRAM reconstruction with healed incisions.  No masses or abnormalities palpated bilaterally.  Status post recent nipple tattoo left breast  Chest:   Breasts:      Right: No supraclavicular adenopathy.      Left: No supraclavicular adenopathy.       Abdominal:      General: Bowel sounds are " normal. There is no distension.      Palpations: Abdomen is soft.      Tenderness: There is no abdominal tenderness.   Lymphadenopathy:      Head:      Right side of head: No submandibular adenopathy.      Cervical: No cervical adenopathy.      Upper Body:      Right upper body: No supraclavicular adenopathy.      Left upper body: No supraclavicular adenopathy.   Skin:     General: Skin is warm and dry.      Findings: No rash.   Neurological:      Mental Status: She is alert and oriented to person, place, and time.      Cranial Nerves: No cranial nerve deficit.      Motor: No abnormal muscle tone.      Deep Tendon Reflexes: Reflexes normal.   Psychiatric:         Behavior: Behavior normal.       Patient was examined today, unchanged from above     Result Review : Reviewed CBC, CMP, magnesium, phosphorus, iron panel, ferritin, B12 from today.       Assessment and Plan  1. Stage IIA ([id number]) left breast cancer:     · Status post left mastectomy with left axillary lymph node dissection 03/12/2013.  Lesion #1 invasive ductal carcinoma grade 2, 1.5 cm ER positive (90%), KS positive (1-4%), HER-2/jodi FISH negative (ratio 1.4),  Ki-67 score of 8%.  Lesion #2 invasive ductal carcinoma, grade 2, 1.7 cm,  ER positive (70%), KS negative (%),HER-2/jodi FISH positive (ratio of 3.0).  3 of 15 lymph nodes positive (2 macrometastases, 1 micrometastasis, largest lesion 2.5 cm replacing lymph node).  Lymph node evaluated and was ER positive (90%), KS positive (30%), HER-2/jodi FISH negative (ratio of 1.4).   · She received adjuvant dose-dense chemotherapy with AC x4 cycles followed by weekly Taxol x12 doses completed on 08/27/2013. She did receive Herceptin in conjunction with the Taxol and then completed a total of a year with last dose of Herceptin received on 05/21/2014 (3-week dose).   · She did initiate adjuvant tamoxifen on 09/25/2013 and we anticipate likely a 10-year course of treatment.   · BRCA-1 and 2 testing negative  at time of diagnosis.  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  · Due to continued menorrhagia she underwent KATE/BSO on 6/5/18 with benign findings.    · DEXA scan 10/12/18 with evidence of osteopenia with maximal T score -1.4 the lumbar spine.    · Since the patient was rendered surgically postmenopausal with only mild osteopenia, she was transitioned from tamoxifen to adjuvant Arimidex on 11/21/18 with intent to complete 10 years of total adjuvant endocrine therapy (from time of initiation of tamoxifen 9/25/13).  · The patient was able to undergo reconstructive therapy in late 2020.  Dr. Montgomery performed a left TRAM reconstruction on 8/4/2020, pathology from specimen was negative for malignancy.  She had difficulty with necrosis and underwent revision procedure on 8/24/2020, again with pathology negative for malignancy but showing evidence of inflammation and fat necrosis.  She underwent subsequent right reduction mammoplasty for symmetry on 11/24/2020.  Pathology from the right breast was negative for malignancy.  · Additional plastic surgery procedure 2/25/2021 to excise excess tissue left lateral chest wall/axilla  · The patient returns today in follow-up continuing on adjuvant Arimidex 1 mg daily.  She is tolerating Arimidex without any significant side effects.  She has no clinical evidence of recurrent disease.  Exam today is negative.  We will schedule her annual mammogram on the right side in October 2022.  We will proceed on with Prolia as planned today.  She will return again in 6 months when she is due for Prolia just prior to that visit we will schedule DEXA scan.  Plan to treat with Arimidex until September 2023 (10 years).  2. Depression/anxiety with prior psychotic episode:   · The patient required hospitalization in the behavioral health unit in October 2015.   · She has had difficulties with anxiety and depression in the past, however, given her breast cancer diagnosis as well  as stressors from her job and family, it appears that these precipitated the event.    · She has stabilized and continues on Lexapro at 20 mg per day.   3. Rheumatoid arthritis:   · The patient was experiencing diffuse arthralgias as well as pain in her wrists right greater than left.    · We did perform an evaluation 4/28/17 with negative MARYSE panel however rheumatoid factor was positive at 16.4.    · She was referred to rheumatology Dr. Lopez in May 2017.   · The patient was felt to have early autoimmune disorder/rheumatoid arthritis.    · She was started on Plaquenil.    · Symptoms have stabilized and improved over time.    · Patient discontinued Plaquenil on her own sometime in 2021  · Today patient reports that she has not experienced any recurrence of her arthralgias remains off of Plaquenil.  She has not followed up with rheumatology in quite some time.  4. Vitamin D deficiency:   · The patient has been on and off of vitamin D supplementation in the past.    · Borderline low 25 hydroxy vitamin D level on 5/14/2019 at 28.3 and was taking vitamin D 400 units over-the-counter daily, subsequently increased dose to 800 units daily.    · On 8/6/2019, 25 hydroxy vitamin D level had improved to 34.8.  5. Anemia:   · The patient developed significant anemia with a hemoglobin down to 10.8 on 3/30/16.    · Evaluation revealed iron deficiency with a ferritin of 12.5 iron percent saturation of 5 and a TIBC 535.    · She started oral iron sulfate 325 mg twice a day and was tolerating this very well.  Her hemoglobin improved into the 12 range.    · Her iron studies showed a significant improvement previously in October 2016 with ferritin up to 54, iron saturation 16%.    · She was asked to continue on oral iron twice a day however she did not do so.    · The patient also was found to have B12 deficiency with a level of 187 on 3/30/16.  She started B12 replacement and received weekly dosing ×4 followed bimonthly injections.   As it was difficult for her to come in to the office for B12 injections, she was asked to begin oral B12 1000 µg daily however she did not do this.    · On 4/28/17, she was mildly anemic with a hemoglobin of 11.4.  She had a thrombocytosis as well, related to iron deficiency.  Repeat iron studies 4/28/17 showed a ferritin that declined to 42, iron saturation low at 7%, elevated TIBC at 491. She was asked to resume oral iron twice daily.  B12 level 4/28/17 had remained in the low normal range at 370 and was monitored.    · On 7/14/17, B12 level was again low normal at 299 if she was asked to resume oral B12 however she did not do so.    · She was intolerant of oral iron with diarrhea and abdominal cramping.    · Labs on 11/8/17 showed a declining iron saturation 9% and a slight decline in ferritin down to 39 with a TIBC at the 501.  Her hemoglobin was up to 12.8.  Oral iron was discontinued 11/8/17 and she proceeded on with Feraheme on 11/15/17 and 11/22/17.  She also resumed oral B12 daily.    · The patient had recurrent menorrhagia but underwent KATE/BSO on 6/5/18.    · B12 level on 5/14/2019 was 1053.    · On 1/17/2020, hemoglobin declined slightly to 11.7.  We did repeat her iron studies showing ferritin of 116, iron 27, iron saturation 8%, TIBC 325.  Her anemia may be related to recent gastric bypass procedure.   · On 1/8/2021, hemoglobin was 12.9 with iron 51, ferritin 37.8, iron saturation 11%, TIBC 469, B12 799.  There had been gradual decline in her ferritin level and with development of low iron saturation.  She has been intolerant of oral iron and would likely not absorb the iron at this point due to her gastric bypass.  Therefore received Injectafer 750 mg IV on 1/15 and 1/22/2021.  · Patient does continue on multivitamin that contains small amounts of iron and B12  · Patient returns today with hemoglobin normal at 13.5.  She remains iron replete with iron 56, ferritin 182.7, iron saturation 14%, TIBC 114.   B12 normal at 358.  Recheck values in 6 months.  6. Elevated transaminases with evidence of hepatic steatosis:   · The patient has had intermittent mild elevation in transaminases.    · Viral hepatitis panel -5/14/2019.    · Hepatic ultrasound 5/28/2019 with borderline hepatomegaly with probable mild fatty infiltration with increased echotexture.    · LFTs remain normal currently  7. Left upper extremity lymphedema:   · The patient does wear her sleeve intermittently, is not wearing it today.    · Lymphedema is stable to improved.  8. Osteopenia:   · Previous DEXA scan 3/5/14 osteopenia with maximal T score of -1.1 in the lumbar spine.    · Surgically postmenopausal.    · Repeat DEXA scan in 10/12/18 with maximal T score -1.4 lumbar spine showing worsening since 2014.    · Aromatase inhibitor therapy initiated with Arimidex 1 mg daily on 11/20/18.    · One year follow-up DEXA scan 10/18/2019 showed a 5% improvement in lumbar spine with T score 0.1, 23% decrease right femoral neck with T score -2.1, 18% improvement in left femoral neck with T score -0.3.  With decline in bone density in the right femoral neck approaching osteoporosis, recommended that we pursue treatment at this point as she continues on aromatase inhibitor therapy.  She is not a good candidate for oral Fosamax given her difficulties with gastritis and ulcer as well as gastric bypass.  Therefore initiated Prolia on 10/31/2019.    · 1 year interval follow-up DEXA scan on 12/31/2020 showed evidence of osteopenia with T score lumbar spine -0.5, left hip -0.6, and right hip -1.5.  The maximal T score in the right hip improved significantly compared to value from 2019 at which time it was -2.1.  There was a slight decline in normal bone density in the lumbar spine and left hip.  · Plan to continue Prolia and repeat 2-year interval follow-up DEXA scan in December 2022.  If further improvement, consider whether patient could stop Prolia.  · Patient will  proceed with Prolia today as planned.  Return in 6 months for Prolia in 1 week prior we will perform DEXA scan.  9. Hypercalcemia:   · The patient had hypercalcemia on 5/14/2019 with calcium 10.8, albumin 4.6.  Intact PTH was suppressed at 12.8.  Suspected to be related to exogenous calcium supplementation.   · Patient today with borderline elevated calcium at 10.4.  She reports that she is not taking a specific calcium supplement at this time but is taking a multivitamin.  We will recheck intact parathyroid hormone today.  10. Obesity:   · The patient is status post gastric bypass at [facility] on 11/13/2019.    · The patient lost significant weight down from 296 down to plateau around 200 pounds.  · Weight has increased up to 228 pounds today  11. Reported gastritis/gastric ulcer:   · EGD performed prior to gastric bypass showed evidence of ulcer and gastritis.    · She was treated with Carafate and omeprazole with follow-up EGD on 10/14/2019 that showed improvement but did also show a hiatal hernia.      PLAN:     1. Proceed with Prolia today  2. Continue adjuvant Arimidex 1 mg daily  3. We will add intact PTH to today's labs  4. Continue multivitamin daily which contains 500 mcg B12 as well as minimal amount of iron  5. Schedule annual right-sided mammogram in October 2022  6. MD visit in 6 months with CBC, CMP, magnesium, phosphorus, iron panel, ferritin, B12 level.    Patient will be due for Prolia.   1 week prior to visit she will undergo DEXA scan.     I did spend 40 minutes in total time caring for the patient today, time spent in review of records, face-to-face consultation, coordination of care, placement of orders, completion of documentation.

## 2022-08-10 ENCOUNTER — INFUSION (OUTPATIENT)
Dept: ONCOLOGY | Facility: HOSPITAL | Age: 47
End: 2022-08-10

## 2022-08-10 ENCOUNTER — LAB (OUTPATIENT)
Dept: LAB | Facility: HOSPITAL | Age: 47
End: 2022-08-10

## 2022-08-10 ENCOUNTER — OFFICE VISIT (OUTPATIENT)
Dept: ONCOLOGY | Facility: CLINIC | Age: 47
End: 2022-08-10

## 2022-08-10 VITALS
BODY MASS INDEX: 38.14 KG/M2 | TEMPERATURE: 97.1 F | HEIGHT: 65 IN | RESPIRATION RATE: 17 BRPM | DIASTOLIC BLOOD PRESSURE: 85 MMHG | SYSTOLIC BLOOD PRESSURE: 144 MMHG | WEIGHT: 228.9 LBS | HEART RATE: 100 BPM | OXYGEN SATURATION: 96 %

## 2022-08-10 DIAGNOSIS — M85.89 OSTEOPENIA OF MULTIPLE SITES: ICD-10-CM

## 2022-08-10 DIAGNOSIS — M85.89 OSTEOPENIA OF MULTIPLE SITES: Primary | ICD-10-CM

## 2022-08-10 DIAGNOSIS — D51.8 OTHER VITAMIN B12 DEFICIENCY ANEMIA: ICD-10-CM

## 2022-08-10 DIAGNOSIS — D50.8 OTHER IRON DEFICIENCY ANEMIA: ICD-10-CM

## 2022-08-10 DIAGNOSIS — D51.8 OTHER VITAMIN B12 DEFICIENCY ANEMIA: Primary | ICD-10-CM

## 2022-08-10 DIAGNOSIS — Z17.0 MALIGNANT NEOPLASM OF OVERLAPPING SITES OF LEFT BREAST IN FEMALE, ESTROGEN RECEPTOR POSITIVE: ICD-10-CM

## 2022-08-10 DIAGNOSIS — E83.52 HYPERCALCEMIA: ICD-10-CM

## 2022-08-10 DIAGNOSIS — C50.812 MALIGNANT NEOPLASM OF OVERLAPPING SITES OF LEFT BREAST IN FEMALE, ESTROGEN RECEPTOR POSITIVE: ICD-10-CM

## 2022-08-10 LAB
ALBUMIN SERPL-MCNC: 4.9 G/DL (ref 3.5–5.2)
ALBUMIN/GLOB SERPL: 1.6 G/DL (ref 1.1–2.4)
ALP SERPL-CCNC: 114 U/L (ref 38–116)
ALT SERPL W P-5'-P-CCNC: 23 U/L (ref 0–33)
ANION GAP SERPL CALCULATED.3IONS-SCNC: 14 MMOL/L (ref 5–15)
AST SERPL-CCNC: 22 U/L (ref 0–32)
BASOPHILS # BLD AUTO: 0.07 10*3/MM3 (ref 0–0.2)
BASOPHILS NFR BLD AUTO: 0.8 % (ref 0–1.5)
BILIRUB SERPL-MCNC: 0.2 MG/DL (ref 0.2–1.2)
BUN SERPL-MCNC: 18 MG/DL (ref 6–20)
BUN/CREAT SERPL: 26.9 (ref 7.3–30)
CALCIUM SPEC-SCNC: 10.4 MG/DL (ref 8.5–10.2)
CHLORIDE SERPL-SCNC: 105 MMOL/L (ref 98–107)
CO2 SERPL-SCNC: 24 MMOL/L (ref 22–29)
CREAT SERPL-MCNC: 0.67 MG/DL (ref 0.6–1.1)
DEPRECATED RDW RBC AUTO: 42.1 FL (ref 37–54)
EGFRCR SERPLBLD CKD-EPI 2021: 107.3 ML/MIN/1.73
EOSINOPHIL # BLD AUTO: 0.26 10*3/MM3 (ref 0–0.4)
EOSINOPHIL NFR BLD AUTO: 2.8 % (ref 0.3–6.2)
ERYTHROCYTE [DISTWIDTH] IN BLOOD BY AUTOMATED COUNT: 12.2 % (ref 12.3–15.4)
FERRITIN SERPL-MCNC: 182.7 NG/ML (ref 11–207)
GLOBULIN UR ELPH-MCNC: 3.1 GM/DL (ref 1.8–3.5)
GLUCOSE SERPL-MCNC: 135 MG/DL (ref 74–124)
HCT VFR BLD AUTO: 40.9 % (ref 34–46.6)
HGB BLD-MCNC: 13.5 G/DL (ref 12–15.9)
IMM GRANULOCYTES # BLD AUTO: 0.03 10*3/MM3 (ref 0–0.05)
IMM GRANULOCYTES NFR BLD AUTO: 0.3 % (ref 0–0.5)
IRON 24H UR-MRATE: 56 MCG/DL (ref 37–145)
IRON SATN MFR SERPL: 14 % (ref 14–48)
LYMPHOCYTES # BLD AUTO: 3.09 10*3/MM3 (ref 0.7–3.1)
LYMPHOCYTES NFR BLD AUTO: 33.5 % (ref 19.6–45.3)
MAGNESIUM SERPL-MCNC: 1.7 MG/DL (ref 1.8–2.5)
MCH RBC QN AUTO: 30.6 PG (ref 26.6–33)
MCHC RBC AUTO-ENTMCNC: 33 G/DL (ref 31.5–35.7)
MCV RBC AUTO: 92.7 FL (ref 79–97)
MONOCYTES # BLD AUTO: 0.53 10*3/MM3 (ref 0.1–0.9)
MONOCYTES NFR BLD AUTO: 5.7 % (ref 5–12)
NEUTROPHILS NFR BLD AUTO: 5.24 10*3/MM3 (ref 1.7–7)
NEUTROPHILS NFR BLD AUTO: 56.9 % (ref 42.7–76)
NRBC BLD AUTO-RTO: 0 /100 WBC (ref 0–0.2)
PHOSPHATE SERPL-MCNC: 3.9 MG/DL (ref 2.5–4.5)
PLATELET # BLD AUTO: 388 10*3/MM3 (ref 140–450)
PMV BLD AUTO: 10 FL (ref 6–12)
POTASSIUM SERPL-SCNC: 4.2 MMOL/L (ref 3.5–4.7)
PROT SERPL-MCNC: 8 G/DL (ref 6.3–8)
RBC # BLD AUTO: 4.41 10*6/MM3 (ref 3.77–5.28)
SODIUM SERPL-SCNC: 143 MMOL/L (ref 134–145)
TIBC SERPL-MCNC: 414 MCG/DL (ref 249–505)
TRANSFERRIN SERPL-MCNC: 296 MG/DL (ref 200–360)
VIT B12 BLD-MCNC: 358 PG/ML (ref 211–946)
WBC NRBC COR # BLD: 9.22 10*3/MM3 (ref 3.4–10.8)

## 2022-08-10 PROCEDURE — 84466 ASSAY OF TRANSFERRIN: CPT

## 2022-08-10 PROCEDURE — 36415 COLL VENOUS BLD VENIPUNCTURE: CPT

## 2022-08-10 PROCEDURE — 82607 VITAMIN B-12: CPT | Performed by: INTERNAL MEDICINE

## 2022-08-10 PROCEDURE — 96372 THER/PROPH/DIAG INJ SC/IM: CPT

## 2022-08-10 PROCEDURE — 82728 ASSAY OF FERRITIN: CPT

## 2022-08-10 PROCEDURE — 84100 ASSAY OF PHOSPHORUS: CPT

## 2022-08-10 PROCEDURE — 83540 ASSAY OF IRON: CPT

## 2022-08-10 PROCEDURE — 99215 OFFICE O/P EST HI 40 MIN: CPT | Performed by: INTERNAL MEDICINE

## 2022-08-10 PROCEDURE — 83735 ASSAY OF MAGNESIUM: CPT

## 2022-08-10 PROCEDURE — 25010000002 DENOSUMAB 60 MG/ML SOLUTION PREFILLED SYRINGE: Performed by: INTERNAL MEDICINE

## 2022-08-10 PROCEDURE — 80053 COMPREHEN METABOLIC PANEL: CPT

## 2022-08-10 RX ADMIN — DENOSUMAB 60 MG: 60 INJECTION SUBCUTANEOUS at 17:25

## 2022-08-11 ENCOUNTER — TELEPHONE (OUTPATIENT)
Dept: ONCOLOGY | Facility: CLINIC | Age: 47
End: 2022-08-11

## 2022-08-11 LAB — PTH-INTACT SERPL-MCNC: NORMAL PG/ML

## 2022-08-11 NOTE — TELEPHONE ENCOUNTER
Caller: STEFANY - [facility]    Relationship: Provider    Best call back number: [phone]    What is the best time to reach you: ANY IF NEEDED    Who are you requesting to speak with (clinical staff, provider,  specific staff member): DR. CAMEJO'S NURSE    Do you know the name of the person who called: STEFANY    What was the call regarding: WANTED TO LET YOU KNOW THAT THEY WERE NOT ABLE TO GET PATIENTS PTH, THEY COLLECTED IN PURPLE TUBE & CANNOT GET PLASMA EDTA FROM THAT TUBE, SO STATED WE WOULD HAVE TO GET.  I HOPE I STATED ALL THIS CORRECTLY!    Do you require a callback: IF NEEDED

## 2022-08-17 ENCOUNTER — TELEPHONE (OUTPATIENT)
Dept: ONCOLOGY | Facility: CLINIC | Age: 47
End: 2022-08-17

## 2022-08-17 NOTE — TELEPHONE ENCOUNTER
CALLED PT LEFT MSG ON VM - NEEDS TO SCHEDULE LAB APPT PER STAFF MSG - WRONG TUBE USED AND NEEDS TO BE RECOLLECTED

## 2022-10-20 ENCOUNTER — APPOINTMENT (OUTPATIENT)
Dept: LAB | Facility: HOSPITAL | Age: 47
End: 2022-10-20

## 2022-10-24 ENCOUNTER — APPOINTMENT (OUTPATIENT)
Dept: WOMENS IMAGING | Facility: HOSPITAL | Age: 47
End: 2022-10-24

## 2022-10-24 PROCEDURE — 77067 SCR MAMMO BI INCL CAD: CPT | Performed by: RADIOLOGY

## 2022-10-24 PROCEDURE — 77063 BREAST TOMOSYNTHESIS BI: CPT | Performed by: RADIOLOGY

## 2022-10-26 ENCOUNTER — LAB (OUTPATIENT)
Dept: LAB | Facility: HOSPITAL | Age: 47
End: 2022-10-26

## 2022-10-26 DIAGNOSIS — M85.89 OSTEOPENIA OF MULTIPLE SITES: Primary | ICD-10-CM

## 2022-10-26 LAB — PTH-INTACT SERPL-MCNC: 91.6 PG/ML (ref 15–65)

## 2022-10-26 PROCEDURE — 83970 ASSAY OF PARATHORMONE: CPT | Performed by: INTERNAL MEDICINE

## 2022-10-26 PROCEDURE — 36415 COLL VENOUS BLD VENIPUNCTURE: CPT

## 2023-01-16 ENCOUNTER — HOSPITAL ENCOUNTER (OUTPATIENT)
Dept: BONE DENSITY | Facility: HOSPITAL | Age: 48
Discharge: HOME | End: 2023-01-16
Admitting: INTERNAL MEDICINE
Payer: COMMERCIAL

## 2023-01-16 DIAGNOSIS — M85.89 OSTEOPENIA OF MULTIPLE SITES: ICD-10-CM

## 2023-01-16 DIAGNOSIS — C50.812 MALIGNANT NEOPLASM OF OVERLAPPING SITES OF LEFT BREAST IN FEMALE, ESTROGEN RECEPTOR POSITIVE: ICD-10-CM

## 2023-01-16 DIAGNOSIS — Z17.0 MALIGNANT NEOPLASM OF OVERLAPPING SITES OF LEFT BREAST IN FEMALE, ESTROGEN RECEPTOR POSITIVE: ICD-10-CM

## 2023-01-16 PROCEDURE — 77080 DXA BONE DENSITY AXIAL: CPT

## 2023-01-20 ENCOUNTER — TELEPHONE (OUTPATIENT)
Dept: ONCOLOGY | Facility: CLINIC | Age: 48
End: 2023-01-20
Payer: COMMERCIAL

## 2023-01-20 DIAGNOSIS — M25.471 PAIN AND SWELLING OF ANKLE, RIGHT: Primary | ICD-10-CM

## 2023-01-20 DIAGNOSIS — M25.571 PAIN AND SWELLING OF ANKLE, RIGHT: Primary | ICD-10-CM

## 2023-01-20 NOTE — TELEPHONE ENCOUNTER
Caller: Yu Thakur    Relationship: Self    Best call back number: [phone]    What is the best time to reach you: ANYTIME    Who are you requesting to speak with (clinical staff, provider,  specific staff member): CLINICAL     What was the call regarding: PT HAS HAD A ANKLE PAIN FOR A FEW MONTHS, CAN YOU REFER HER TO SOMEONE.  CALL TO DISCUSS    Do you require a callback: YES

## 2023-01-20 NOTE — TELEPHONE ENCOUNTER
Patient returned call to me and she is reporting that she has right ankle pain.  The pain has been ongoing since early December in her right malleolus area.  She stated that the area is a little swollen and she bought new shoes, but since she has been on her feet all week the pain has flared up.  She has been seen by [facility] orthopedic group in the past for Plantar Fasciitis and is wondering if she could be referred there again.  Please advise.

## 2023-01-23 NOTE — TELEPHONE ENCOUNTER
"Returned call to patient with the information from Dr. Lyon as follows:  \"  She also has a history of rheumatoid arthritis, unclear whether it could be related to that as well.  I am fine referring her to orthopedics however she may need to return also to rheumatology for evaluation.  Maybe we should have her come in sometime next week to see NP first\".           Patient prefers to be referred to [facility].  She stated that her RA is in her hands and not her feet.  She does not feel like she needs to be seen by a NP here.  Would just like to be referred to ortho group.  "

## 2023-02-09 ENCOUNTER — OFFICE VISIT (OUTPATIENT)
Dept: ORTHOPEDIC SURGERY | Facility: CLINIC | Age: 48
End: 2023-02-09
Payer: COMMERCIAL

## 2023-02-09 VITALS — HEIGHT: 65 IN | BODY MASS INDEX: 36.49 KG/M2 | TEMPERATURE: 98.6 F | WEIGHT: 219 LBS

## 2023-02-09 DIAGNOSIS — R52 PAIN: ICD-10-CM

## 2023-02-09 DIAGNOSIS — M25.871 MASS OF JOINT OF RIGHT ANKLE: Primary | ICD-10-CM

## 2023-02-09 DIAGNOSIS — M19.079 ARTHRITIS OF FOOT: ICD-10-CM

## 2023-02-09 PROCEDURE — 73610 X-RAY EXAM OF ANKLE: CPT | Performed by: ORTHOPAEDIC SURGERY

## 2023-02-09 PROCEDURE — 73630 X-RAY EXAM OF FOOT: CPT | Performed by: ORTHOPAEDIC SURGERY

## 2023-02-09 PROCEDURE — 99204 OFFICE O/P NEW MOD 45 MIN: CPT | Performed by: ORTHOPAEDIC SURGERY

## 2023-02-09 NOTE — PROGRESS NOTES
"   New Patient Complaint      Patient: Yu Thakur  YOB: 1975 47 y.o. female  Medical Record Number: [id number]    Chief Complaints: Right ankle hurts    History of Present Illness: Patient reports onset of pain and swelling over the medial aspect of her right ankle just distal anterior to the medial malleolus about 3 months ago as well and in the dorsum of her hindfoot and some into the midfoot.  The only injury she knows of is around that time she had kicked a bathroom door when she was frustrated with her kids at school.  She gets a \"crunchy feeling\" in the dorsum of her hindfoot/midfoot area as well with walking and is using tennis shoes majority of the time.      I have seen her in the remote past in 2014 with MRI of her left hindfoot for plantar fasciitis and MRI at that time did show plantar fasciitis with reactive calcaneal marrow edema as well as a stress reaction with microtrabecular fracture of the proximal fourth metatarsal and a distal third metatarsal shaft bone island.       She has a history of breast cancer and is on Arimidex and is nearing her 10-year judi.  She is also on Prolia shots.    She is seen today at the request of Dr. Glenn Lyon who is requested my opinion regarding etiology and treatment of this condition    HPI    Allergies:   Allergies   Allergen Reactions   • Adhesive Tape Rash   • Shellfish-Derived Products GI Intolerance   • Shrimp (Diagnostic) Nausea And Vomiting       Medications:   Current Outpatient Medications on File Prior to Visit   Medication Sig   • anastrozole (ARIMIDEX) 1 MG tablet TAKE ONE TABLET BY MOUTH DAILY   • escitalopram (LEXAPRO) 20 MG tablet Take 20 mg by mouth Every Night.   • LORazepam (ATIVAN) 1 MG tablet Take 1 mg by mouth Every Night.   • Multiple Vitamins-Minerals (OPURITY PO) Take 1 tablet by mouth Every Night.   • ciprofloxacin (CILOXAN) 0.3 % ophthalmic solution    • fluconazole (DIFLUCAN) 200 MG tablet Take 200 mg by mouth 1 (One) " Time Per Week.   • triamcinolone (KENALOG) 0.1 % ointment      No current facility-administered medications on file prior to visit.       Past Medical History:   Diagnosis Date   • Anxiety    • Depression    • Fracture, tibia and fibula     Surgery   • H/O Borderline vitamin D deficiency    • H/O Grade 1 peripheral neuropathy     RESOLVED   • H/O Lymphedema of left upper extremity    • History of anemia    • History of cancer chemotherapy    • History of radiation therapy    • Hx of being hospitalized 10/2015    Due to major depressive disorder with psychotic features.   • Knee swelling     Surgery on knee   • Malignant neoplasm of breast (HCC) 2013    LEFT SIDE WITH MASTECTOMY (BRCA-1 and 2 negative)   • Osteopenia    • Plantar fasciitis     HISTORY OF   • Rheumatoid arthritis (HCC)    • Sleep apnea     USES CPAP   • Stress fracture    • Tenosynovitis of left wrist    • Vitamin D deficiency     BORDERLINE     Past Surgical History:   Procedure Laterality Date   • BILATERAL BREAST REDUCTION Right 2020    Procedure: RIGHT BREAST  REDUCTION FOR SYMMENTRY;  Surgeon: KAUSHAL Montgomery MD;  Location: [facility] MAIN OR;  Service: Plastics;  Laterality: Right;   •  SECTION  1999, 2002    x2   • COLONOSCOPY N/A 2022    Procedure: COLONOSCOPY to cecum;  Surgeon: Praveena Shaffer MD;  Location: [facility] ENDOSCOPY;  Service: General;  Laterality: N/A;  pre- screening   post- normal    • D & C HYSTEROSCOPY N/A 2016    Procedure: DILATATION AND CURETTAGE HYSTEROSCOPY;  Surgeon: Elda Roland MD;  Location: [facility] OR OSC;  Service:    • ENDOSCOPY     • HAND SURGERY  Carpel tunnel       • KNEE SURGERY Right     After traumatic fracture with hardware in place.   • LAPAROSCOPIC GASTRIC BYPASS  2019    at [facility]   • MASTECTOMY     • MEDIPORT INSERTION, DOUBLE Right    • MEDIPORT REMOVAL Right 2014    By Dr. Mabry.   • MODIFIED RADICAL MASTECTOMY W/ AXILLARY LYMPH NODE  DISSECTION Left 03/2013   • SCAR REVISION BREAST Left 08/24/2020    Procedure: EXCISION AND REVISION LEFT TRANSVERSES RECTUS ABDOMINIOUS MUSCEL FLAP EDGE NECROSIS;  Surgeon: KAUSHAL Montgomery MD;  Location: [facility] OR;  Service: Plastics;  Laterality: Left;   • SCAR REVISION BREAST Left 02/25/2021    Procedure: AXILLARY EXCISION AND REVISION;  Surgeon: KAUSHAL Montgomery MD;  Location: [facility] OR;  Service: Plastics;  Laterality: Left;   • TOTAL LAPAROSCOPIC HYSTERECTOMY WITH TRANSVAGINAL TAPING Bilateral 06/05/2018    Procedure: TOTAL LAPAROSCOPIC HYSTERECTOMY SHA SALPNGO OOPHORECTOMY TENSION FREE VAGINAL TAPING CYSTOSCOPY;  Surgeon: Jocelynn Zimmerman MD;  Location: [facility] OR;  Service: Gynecology   • TRAM FLAP DELAYED Left 08/04/2020    Procedure: LEFT TRANSVERS RECTIS ABDOMINAL MUSCLE FLAP;  Surgeon: KAUSHAL Montgomery MD;  Location: [facility] OR;  Service: Plastics;  Laterality: Left;     Social History     Occupational History   • Occupation: Teacher     Employer: [employer]     Comment: Worked in marketing in past.   Tobacco Use   • Smoking status: Never   • Smokeless tobacco: Never   Vaping Use   • Vaping Use: Never used   Substance and Sexual Activity   • Alcohol use: Yes     Alcohol/week: 2.0 standard drinks     Types: 2 Drinks containing 0.5 oz of alcohol per week     Comment: Vicky   • Drug use: Never   • Sexual activity: Defer      Social History     Social History Narrative    Enjoys family activities, sports, 4-H leader/[employer] [place]. College education.     Family History   Problem Relation Age of Onset   • Cancer Other    • Stroke Other    • Diabetes Other    • Hypertension Other    • Other Other         cardiac disorder   • Ovarian cancer Other    • Lymphoma Other         NON-HODGKIN   • Diabetes insipidus Other    • Anemia Other    • Mental illness Other         Grandfather   • Arthritis Other    • Heart disease Other    • Breast cancer Maternal Grandmother         In her 70s.   •  "Cancer Maternal Grandmother    • Prostate cancer Maternal Grandfather    • Other Sister         Anemia and easy bruising   • Hypertension Sister    • Hypertension Mother    • Mental illness Mother    • Hypertension Father    • Diabetes Father    • Malig Hyperthermia Neg Hx        Review of Systems: 14 point review of systems performed, positive pertinent findings identified in HPI. All remaining systems negative     Review of Systems      Physical Exam:   Vitals:    02/09/23 1319   Temp: 98.6 °F (37 °C)   TempSrc: Temporal   Weight: 99.3 kg (219 lb)   Height: 165.1 cm (65\")   PainSc:   7   PainLoc: Ankle     Physical Exam   Constitutional: pleasant, well developed   Eyes: sclera non icteric  Hearing : adequate for exam  Cardiovascular: palpable pulses in right foot, right calf/ thigh NT without sign of DVT  Respiratoy: breathing unlabored   Neurological: grossly sensate to LT throughout right LE  Psychiatric: oriented with normal mood and affect.   Lymphatic: No palpable popliteal lymphadenopathy right LE  Skin: intact throughout right leg/foot  Musculoskeletal: On exam there is an area of prominence and fullness over the medial slightly anterior aspect of the medial malleolus that measures about 1-1/2 cm x 1/2 cm x 1 cm with tenderness to palpation.  She also had some mild tenderness over the dorsum of the midfoot hindfoot area.  Physical Exam  Ortho Exam    Radiology: 3 views of the right ankle and 3 views of the right foot ordered evaluate pain reviewed and no prior x-rays of the right foot or ankle available for comparison had old x-rays of the left foot and calcaneus.  Today's x-rays show mild plantar calcaneal spurring I do not see any obvious fracture talus is well-seated within the mortise no obvious osteochondral defect or  malalignment of the talus within the mortise    There are some mild metatarsus adductus but nothing dramatic.  There are some arthritis of the medial naviculocuneiform joint and some to " the second and third more so than fourth TMT joint.  No obvious fracture of the navicular although there is somewhat of a questionable line along the lateral fourth of the navicular.    Assessment/Plan: 1.  Right distal medial pain with area of fullness with possible mass  2.  Right hindfoot/midfoot pain with possible exacerbation of arthritis and possible navicular stress fracture    We discussed treatment going forward and had her fitted with a boot and upper offload this with a cane for any weightbearing activity.    We will get an MRI with IV contrast of her right hindfoot including her midfoot to evaluate mass over the medial ankle and for any stress fractures of the midfoot hindfoot area.    We will see her back in 3 weeks with x-rays of her right foot and ankle.

## 2023-02-10 ENCOUNTER — PATIENT ROUNDING (BHMG ONLY) (OUTPATIENT)
Dept: ORTHOPEDIC SURGERY | Facility: CLINIC | Age: 48
End: 2023-02-10
Payer: COMMERCIAL

## 2023-02-10 NOTE — PROGRESS NOTES
A [place] Message has been sent to the patient for PATIENT ROUNDING with [facility]

## 2023-02-14 NOTE — PROGRESS NOTES
"Chief Complaint  Stage IIA ([id number]) left breast cancer ER/NC positive, HER-2/jodi negative.  Recurrent iron deficiency, osteopenia, hepatic steatosis, left upper extremity lymphedema    Subjective        History of Present Illness  Patient returns today for a follow-up visit continuing on adjuvant Arimidex 1 mg daily with plans to complete 10 years of total endocrine therapy in September 2023 due to her high risk disease.  She also continues on every 6-month Prolia injections, due today.  The patient has laboratory studies and DEXA scan to review today.  In the interval since her last visit she did undergo annual right-sided mammogram on 10/20/2022 which was negative, BI-RADS 1.  She did have a minor injury to her right foot/ankle in December and developed worsening pain around the right ankle.  She was referred to orthopedics and was recently seen by Dr. Dejesus.  There are plans to pursue an MRI and she currently has a boot in place on the right side.  She continues to teach school, teaching seventh grade and has quite a bit of frustration with her class.      Objective   Vital Signs:   /78   Pulse 93   Temp 97.3 °F (36.3 °C) (Temporal)   Resp 18   Ht 165.1 cm (65\")   Wt 101 kg (223 lb 4.8 oz)   SpO2 97%   BMI 37.16 kg/m²     Physical Exam  Constitutional:       Appearance: She is well-developed.   HENT:      Left Ear: Tympanic membrane normal.   Eyes:      Conjunctiva/sclera: Conjunctivae normal.   Neck:      Thyroid: No thyromegaly.   Cardiovascular:      Rate and Rhythm: Normal rate and regular rhythm.      Heart sounds: No murmur heard.    No friction rub. No gallop.   Pulmonary:      Effort: No respiratory distress.      Breath sounds: Normal breath sounds.      Comments: Status post right reduction mammoplasty.  Status post left TRAM reconstruction with healed incisions.  No masses or abnormalities palpated bilaterally.  Status post recent nipple tattoo left breast  Abdominal:      " General: Bowel sounds are normal. There is no distension.      Palpations: Abdomen is soft.      Tenderness: There is no abdominal tenderness.   Lymphadenopathy:      Head:      Right side of head: No submandibular adenopathy.      Cervical: No cervical adenopathy.      Upper Body:      Right upper body: No supraclavicular adenopathy.      Left upper body: No supraclavicular adenopathy.   Skin:     General: Skin is warm and dry.      Findings: No rash.   Neurological:      Mental Status: She is alert and oriented to person, place, and time.      Cranial Nerves: No cranial nerve deficit.      Motor: No abnormal muscle tone.      Deep Tendon Reflexes: Reflexes normal.   Psychiatric:         Behavior: Behavior normal.       Patient was examined today, unchanged from above     Result Review : Reviewed CBC, CMP, magnesium, phosphorus, iron panel, ferritin, B12 from today.  Reviewed mammogram 10/20/2022.  Reviewed DEXA scan 1/16/2023.  Reviewed orthopedic notes.       Assessment and Plan  1. Stage IIA ([id number]) left breast cancer:     · Status post left mastectomy with left axillary lymph node dissection 03/12/2013.  Lesion #1 invasive ductal carcinoma grade 2, 1.5 cm ER positive (90%), NE positive (1-4%), HER-2/jodi FISH negative (ratio 1.4),  Ki-67 score of 8%.  Lesion #2 invasive ductal carcinoma, grade 2, 1.7 cm,  ER positive (70%), NE negative (%),HER-2/jodi FISH positive (ratio of 3.0).  3 of 15 lymph nodes positive (2 macrometastases, 1 micrometastasis, largest lesion 2.5 cm replacing lymph node).  Lymph node evaluated and was ER positive (90%), NE positive (30%), HER-2/jodi FISH negative (ratio of 1.4).   · She received adjuvant dose-dense chemotherapy with AC x4 cycles followed by weekly Taxol x12 doses completed on 08/27/2013. She did receive Herceptin in conjunction with the Taxol and then completed a total of a year with last dose of Herceptin received on 05/21/2014 (3-week dose).   · She did initiate  adjuvant tamoxifen on 09/25/2013 and we anticipate likely a 10-year course of treatment.   · BRCA-1 and 2 testing negative at time of diagnosis.  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  · Due to continued menorrhagia she underwent KATE/BSO on 6/5/18 with benign findings.    · DEXA scan 10/12/18 with evidence of osteopenia with maximal T score -1.4 the lumbar spine.    · Since the patient was rendered surgically postmenopausal with only mild osteopenia, she was transitioned from tamoxifen to adjuvant Arimidex on 11/21/18 with intent to complete 10 years of total adjuvant endocrine therapy (from time of initiation of tamoxifen 9/25/13).  · The patient was able to undergo reconstructive therapy in late 2020.  Dr. Montgomery performed a left TRAM reconstruction on 8/4/2020, pathology from specimen was negative for malignancy.  She had difficulty with necrosis and underwent revision procedure on 8/24/2020, again with pathology negative for malignancy but showing evidence of inflammation and fat necrosis.  She underwent subsequent right reduction mammoplasty for symmetry on 11/24/2020.  Pathology from the right breast was negative for malignancy.  · Additional plastic surgery procedure 2/25/2021 to excise excess tissue left lateral chest wall/axilla  · The patient returns today in follow-up continuing on adjuvant Arimidex 1 mg daily.  She continues to tolerate Arimidex without any significant side effects.  She has no clinical evidence of recurrent disease.  Exam today was negative.  She underwent mammogram on the right side 10/20/2022 which was negative, BI-RADS 1.  I will see her back in 6 months for routine follow-up and we will discuss timeframe to stop Arimidex.  We plan to treat with Arimidex until September 2023 (10 years).  2. Depression/anxiety with prior psychotic episode:   · The patient required hospitalization in the behavioral health unit in October 2015.   · She has had difficulties with  anxiety and depression in the past, however, given her breast cancer diagnosis as well as stressors from her job and family, it appears that these precipitated the event.    · She has stabilized and continues on Lexapro at 20 mg per day.   3. Rheumatoid arthritis:   · The patient was experiencing diffuse arthralgias as well as pain in her wrists right greater than left.    · We did perform an evaluation 4/28/17 with negative MARYSE panel however rheumatoid factor was positive at 16.4.    · She was referred to rheumatology Dr. Lopez in May 2017.   · The patient was felt to have early autoimmune disorder/rheumatoid arthritis.    · She was started on Plaquenil.    · Symptoms have stabilized and improved over time.    · Patient discontinued Plaquenil on her own sometime in 2021  · Today patient reports that she has not experienced any recurrence of her arthralgias remains off of Plaquenil.  She has not followed up with rheumatology in quite some time.  4. Vitamin D deficiency:   · The patient has been on and off of vitamin D supplementation in the past.    · Borderline low 25 hydroxy vitamin D level on 5/14/2019 at 28.3 and was taking vitamin D 400 units over-the-counter daily, subsequently increased dose to 800 units daily.    · On 8/6/2019, 25 hydroxy vitamin D level had improved to 34.8.  5. Anemia with history of iron deficiency and B12 deficiency in the setting of gastric bypass:   · The patient developed significant anemia with a hemoglobin down to 10.8 on 3/30/16.    · Evaluation revealed iron deficiency with a ferritin of 12.5 iron percent saturation of 5 and a TIBC 535.    · She started oral iron sulfate 325 mg twice a day and was tolerating this very well.  Her hemoglobin improved into the 12 range.    · Her iron studies showed a significant improvement previously in October 2016 with ferritin up to 54, iron saturation 16%.    · She was asked to continue on oral iron twice a day however she did not do so.     · The patient also was found to have B12 deficiency with a level of 187 on 3/30/16.  She started B12 replacement and received weekly dosing ×4 followed bimonthly injections.  As it was difficult for her to come in to the office for B12 injections, she was asked to begin oral B12 1000 µg daily however she did not do this.    · On 4/28/17, she was mildly anemic with a hemoglobin of 11.4.  She had a thrombocytosis as well, related to iron deficiency.  Repeat iron studies 4/28/17 showed a ferritin that declined to 42, iron saturation low at 7%, elevated TIBC at 491. She was asked to resume oral iron twice daily.  B12 level 4/28/17 had remained in the low normal range at 370 and was monitored.    · On 7/14/17, B12 level was again low normal at 299 if she was asked to resume oral B12 however she did not do so.    · She was intolerant of oral iron with diarrhea and abdominal cramping.    · Labs on 11/8/17 showed a declining iron saturation 9% and a slight decline in ferritin down to 39 with a TIBC at the 501.  Her hemoglobin was up to 12.8.  Oral iron was discontinued 11/8/17 and she proceeded on with Feraheme on 11/15/17 and 11/22/17.  She also resumed oral B12 daily.    · The patient had recurrent menorrhagia but underwent KATE/BSO on 6/5/18.    · B12 level on 5/14/2019 was 1053.    · On 1/17/2020, hemoglobin declined slightly to 11.7.  We did repeat her iron studies showing ferritin of 116, iron 27, iron saturation 8%, TIBC 325.  Her anemia may be related to recent gastric bypass procedure.   · On 1/8/2021, hemoglobin was 12.9 with iron 51, ferritin 37.8, iron saturation 11%, TIBC 469, B12 799.  There had been gradual decline in her ferritin level and with development of low iron saturation.  She has been intolerant of oral iron and would likely not absorb the iron at this point due to her gastric bypass.  Therefore received Injectafer 750 mg IV on 1/15 and 1/22/2021.  · Patient does continue on multivitamin that  contains small amounts of iron and B12  · Patient returns today with hemoglobin normal at 13.2.  She remains iron replete with iron 47, ferritin 160.5, iron saturation borderline low at 12%, TIBC 381.  B12 normal at 349.  She does continue on a multivitamin containing iron and B12.  Recheck values in 6 months.  6. Elevated transaminases with evidence of hepatic steatosis:   · The patient has had intermittent mild elevation in transaminases.    · Viral hepatitis panel -5/14/2019.    · Hepatic ultrasound 5/28/2019 with borderline hepatomegaly with probable mild fatty infiltration with increased echotexture.    · LFTs today are normal  7. Left upper extremity lymphedema:   · The patient does wear her sleeve intermittently, is not wearing it today.    · Lymphedema is stable to improved.  8. Osteopenia:   · Previous DEXA scan 3/5/14 osteopenia with maximal T score of -1.1 in the lumbar spine.    · Surgically postmenopausal.    · Repeat DEXA scan in 10/12/18 with maximal T score -1.4 lumbar spine showing worsening since 2014.    · Aromatase inhibitor therapy initiated with Arimidex 1 mg daily on 11/20/18.    · One year follow-up DEXA scan 10/18/2019 showed a 5% improvement in lumbar spine with T score 0.1, 23% decrease right femoral neck with T score -2.1, 18% improvement in left femoral neck with T score -0.3.  With decline in bone density in the right femoral neck approaching osteoporosis, recommended that we pursue treatment at this point as she continues on aromatase inhibitor therapy.  She is not a good candidate for oral Fosamax given her difficulties with gastritis and ulcer as well as gastric bypass.  Therefore initiated Prolia on 10/31/2019.    · 1 year interval follow-up DEXA scan on 12/31/2020 showed evidence of osteopenia with T score lumbar spine -0.5, left hip -0.6, and right hip -1.5.  The maximal T score in the right hip improved significantly compared to value from 2019 at which time it was -2.1.  There  was a slight decline in normal bone density in the lumbar spine and left hip.  · DEXA scan 1/16/2023 with stable findings, evidence of osteopenia with T score lumbar spine 0.6, left hip -0.9, right hip -1.3.  · We did review the above DEXA scan from 1/16/2023.  Bone density is stable with very mild osteopenia at this point, T score in the right hip -1.3.  We will proceed with Prolia today.  As we are nearing the end of her intended course of endocrine therapy in September 2023 I did suggest referral to endocrinology to address recommendations for ongoing management of her osteopenia, particularly in light of the minimally abnormal T score.  9. Hypercalcemia secondary to hyperparathyroidism:   · The patient had hypercalcemia on 5/14/2019 with calcium 10.8, albumin 4.6.  Intact PTH was suppressed at 12.8.  Suspected to be related to exogenous calcium supplementation.   · On 8/10/2022, calcium was 10.4, intact PTH was elevated at 91.6 consistent with hyperparathyroidism as well as exogenous calcium supplementation  · Calcium today normal at 9.8  10. Obesity:   · The patient is status post gastric bypass at [facility] in [place] on 11/13/2019.    · The patient lost significant weight down from 296 down to plateau around 200 pounds.  · Weight has increased over time, currently 223 pounds  11. Reported gastritis/gastric ulcer:   · EGD performed prior to gastric bypass showed evidence of ulcer and gastritis.    · She was treated with Carafate and omeprazole with follow-up EGD on 10/14/2019 that showed improvement but did also show a hiatal hernia.   12. Right ankle injury  · Patient notes a minor injury to her right ankle in December 2022, has had ongoing pain  · Patient was referred to orthopedics, is felt to possibly have a stress fracture and is currently wearing a boot.  Anticipated upcoming MRI for further evaluation per orthopedics.     PLAN:     1. Proceed with Prolia today  2. Continue adjuvant Arimidex 1 mg  daily  3. Continue multivitamin daily which contains 500 mcg B12 as well as minimal amount of iron  4. The patient will follow-up with orthopedics, anticipate upcoming right ankle/foot MRI  5. Referral to endocrinology for recommendations regarding further management of osteopenia as patient nears completion of her aromatase inhibitor therapy  6. MD visit in 6 months with CBC, CMP, magnesium, phosphorus, iron panel, ferritin, B12 level.    Patient will be due for Prolia, depending on recommendations from endocrinology.  She will be due to stop Arimidex in September 2023, shortly after next visit.

## 2023-02-15 ENCOUNTER — INFUSION (OUTPATIENT)
Dept: ONCOLOGY | Facility: HOSPITAL | Age: 48
End: 2023-02-15
Payer: COMMERCIAL

## 2023-02-15 ENCOUNTER — LAB (OUTPATIENT)
Dept: LAB | Facility: HOSPITAL | Age: 48
End: 2023-02-15
Payer: COMMERCIAL

## 2023-02-15 ENCOUNTER — OFFICE VISIT (OUTPATIENT)
Dept: ONCOLOGY | Facility: CLINIC | Age: 48
End: 2023-02-15
Payer: COMMERCIAL

## 2023-02-15 VITALS
SYSTOLIC BLOOD PRESSURE: 129 MMHG | HEIGHT: 65 IN | BODY MASS INDEX: 37.2 KG/M2 | TEMPERATURE: 97.3 F | RESPIRATION RATE: 18 BRPM | WEIGHT: 223.3 LBS | OXYGEN SATURATION: 97 % | HEART RATE: 93 BPM | DIASTOLIC BLOOD PRESSURE: 78 MMHG

## 2023-02-15 DIAGNOSIS — D51.8 OTHER VITAMIN B12 DEFICIENCY ANEMIA: Primary | ICD-10-CM

## 2023-02-15 DIAGNOSIS — M85.89 OSTEOPENIA OF MULTIPLE SITES: ICD-10-CM

## 2023-02-15 DIAGNOSIS — D50.8 OTHER IRON DEFICIENCY ANEMIA: ICD-10-CM

## 2023-02-15 DIAGNOSIS — M85.89 OSTEOPENIA OF MULTIPLE SITES: Primary | ICD-10-CM

## 2023-02-15 DIAGNOSIS — D51.8 OTHER VITAMIN B12 DEFICIENCY ANEMIA: ICD-10-CM

## 2023-02-15 LAB
ALBUMIN SERPL-MCNC: 4.7 G/DL (ref 3.5–5.2)
ALBUMIN/GLOB SERPL: 1.7 G/DL (ref 1.1–2.4)
ALP SERPL-CCNC: 110 U/L (ref 38–116)
ALT SERPL W P-5'-P-CCNC: 20 U/L (ref 0–33)
ANION GAP SERPL CALCULATED.3IONS-SCNC: 14.2 MMOL/L (ref 5–15)
AST SERPL-CCNC: 18 U/L (ref 0–32)
BASOPHILS # BLD AUTO: 0.06 10*3/MM3 (ref 0–0.2)
BASOPHILS NFR BLD AUTO: 0.7 % (ref 0–1.5)
BILIRUB SERPL-MCNC: <0.2 MG/DL (ref 0.2–1.2)
BUN SERPL-MCNC: 14 MG/DL (ref 6–20)
BUN/CREAT SERPL: 21.9 (ref 7.3–30)
CALCIUM SPEC-SCNC: 9.8 MG/DL (ref 8.5–10.2)
CHLORIDE SERPL-SCNC: 106 MMOL/L (ref 98–107)
CO2 SERPL-SCNC: 22.8 MMOL/L (ref 22–29)
CREAT SERPL-MCNC: 0.64 MG/DL (ref 0.6–1.1)
DEPRECATED RDW RBC AUTO: 41.7 FL (ref 37–54)
EGFRCR SERPLBLD CKD-EPI 2021: 108.5 ML/MIN/1.73
EOSINOPHIL # BLD AUTO: 0.33 10*3/MM3 (ref 0–0.4)
EOSINOPHIL NFR BLD AUTO: 4 % (ref 0.3–6.2)
ERYTHROCYTE [DISTWIDTH] IN BLOOD BY AUTOMATED COUNT: 12.2 % (ref 12.3–15.4)
FERRITIN SERPL-MCNC: 160.5 NG/ML (ref 11–207)
GLOBULIN UR ELPH-MCNC: 2.8 GM/DL (ref 1.8–3.5)
GLUCOSE SERPL-MCNC: 151 MG/DL (ref 74–124)
HCT VFR BLD AUTO: 40.1 % (ref 34–46.6)
HGB BLD-MCNC: 13.2 G/DL (ref 12–15.9)
IMM GRANULOCYTES # BLD AUTO: 0.03 10*3/MM3 (ref 0–0.05)
IMM GRANULOCYTES NFR BLD AUTO: 0.4 % (ref 0–0.5)
IRON 24H UR-MRATE: 47 MCG/DL (ref 37–145)
IRON SATN MFR SERPL: 12 % (ref 14–48)
LYMPHOCYTES # BLD AUTO: 2.71 10*3/MM3 (ref 0.7–3.1)
LYMPHOCYTES NFR BLD AUTO: 33.3 % (ref 19.6–45.3)
MAGNESIUM SERPL-MCNC: 1.9 MG/DL (ref 1.8–2.5)
MCH RBC QN AUTO: 30.5 PG (ref 26.6–33)
MCHC RBC AUTO-ENTMCNC: 32.9 G/DL (ref 31.5–35.7)
MCV RBC AUTO: 92.6 FL (ref 79–97)
MONOCYTES # BLD AUTO: 0.36 10*3/MM3 (ref 0.1–0.9)
MONOCYTES NFR BLD AUTO: 4.4 % (ref 5–12)
NEUTROPHILS NFR BLD AUTO: 4.66 10*3/MM3 (ref 1.7–7)
NEUTROPHILS NFR BLD AUTO: 57.2 % (ref 42.7–76)
NRBC BLD AUTO-RTO: 0 /100 WBC (ref 0–0.2)
PHOSPHATE SERPL-MCNC: 4.2 MG/DL (ref 2.5–4.5)
PLATELET # BLD AUTO: 330 10*3/MM3 (ref 140–450)
PMV BLD AUTO: 10.3 FL (ref 6–12)
POTASSIUM SERPL-SCNC: 3.8 MMOL/L (ref 3.5–4.7)
PROT SERPL-MCNC: 7.5 G/DL (ref 6.3–8)
RBC # BLD AUTO: 4.33 10*6/MM3 (ref 3.77–5.28)
SODIUM SERPL-SCNC: 143 MMOL/L (ref 134–145)
TIBC SERPL-MCNC: 381 MCG/DL (ref 249–505)
TRANSFERRIN SERPL-MCNC: 272 MG/DL (ref 200–360)
VIT B12 BLD-MCNC: 349 PG/ML (ref 211–946)
WBC NRBC COR # BLD: 8.15 10*3/MM3 (ref 3.4–10.8)

## 2023-02-15 PROCEDURE — 84466 ASSAY OF TRANSFERRIN: CPT

## 2023-02-15 PROCEDURE — 83540 ASSAY OF IRON: CPT

## 2023-02-15 PROCEDURE — 82607 VITAMIN B-12: CPT | Performed by: INTERNAL MEDICINE

## 2023-02-15 PROCEDURE — 36415 COLL VENOUS BLD VENIPUNCTURE: CPT

## 2023-02-15 PROCEDURE — 82728 ASSAY OF FERRITIN: CPT

## 2023-02-15 PROCEDURE — 84100 ASSAY OF PHOSPHORUS: CPT

## 2023-02-15 PROCEDURE — 96372 THER/PROPH/DIAG INJ SC/IM: CPT

## 2023-02-15 PROCEDURE — 83735 ASSAY OF MAGNESIUM: CPT

## 2023-02-15 PROCEDURE — 25010000002 DENOSUMAB 60 MG/ML SOLUTION PREFILLED SYRINGE: Performed by: INTERNAL MEDICINE

## 2023-02-15 PROCEDURE — 85025 COMPLETE CBC W/AUTO DIFF WBC: CPT

## 2023-02-15 PROCEDURE — 99214 OFFICE O/P EST MOD 30 MIN: CPT | Performed by: INTERNAL MEDICINE

## 2023-02-15 PROCEDURE — 80053 COMPREHEN METABOLIC PANEL: CPT

## 2023-02-15 RX ADMIN — DENOSUMAB 60 MG: 60 INJECTION SUBCUTANEOUS at 16:49

## 2023-02-16 ENCOUNTER — TELEPHONE (OUTPATIENT)
Dept: ORTHOPEDIC SURGERY | Facility: CLINIC | Age: 48
End: 2023-02-16

## 2023-02-16 NOTE — TELEPHONE ENCOUNTER
Provider:     Caller: PATIENT    Relationship to Patient: SELF      Phone Number:  [phone]    Reason for Call: PT. CALLING TO SEE IF HER MRI OF RIGHT FOOT AND ANKLE HAS BEEN SCHEDULED.   PLEASE CALL TO ADVISE.

## 2023-02-24 DIAGNOSIS — M19.079 ARTHRITIS OF FOOT: ICD-10-CM

## 2023-02-24 DIAGNOSIS — M25.871 MASS OF JOINT OF RIGHT ANKLE: ICD-10-CM

## 2023-03-02 ENCOUNTER — OFFICE VISIT (OUTPATIENT)
Dept: ORTHOPEDIC SURGERY | Facility: CLINIC | Age: 48
End: 2023-03-02
Payer: COMMERCIAL

## 2023-03-02 VITALS — BODY MASS INDEX: 37.15 KG/M2 | TEMPERATURE: 97.1 F | HEIGHT: 65 IN | WEIGHT: 223 LBS

## 2023-03-02 DIAGNOSIS — R52 PAIN: ICD-10-CM

## 2023-03-02 DIAGNOSIS — R93.7 BONE MARROW EDEMA: Primary | ICD-10-CM

## 2023-03-02 DIAGNOSIS — M19.079 ARTHRITIS OF FOOT: ICD-10-CM

## 2023-03-02 DIAGNOSIS — M86.9: ICD-10-CM

## 2023-03-02 DIAGNOSIS — M95.8 OSTEOCHONDRAL DEFECT OF TALUS: ICD-10-CM

## 2023-03-02 DIAGNOSIS — M72.2 PLANTAR FASCIITIS: ICD-10-CM

## 2023-03-02 PROCEDURE — 73620 X-RAY EXAM OF FOOT: CPT | Performed by: ORTHOPAEDIC SURGERY

## 2023-03-02 PROCEDURE — 99214 OFFICE O/P EST MOD 30 MIN: CPT | Performed by: ORTHOPAEDIC SURGERY

## 2023-03-02 PROCEDURE — 73610 X-RAY EXAM OF ANKLE: CPT | Performed by: ORTHOPAEDIC SURGERY

## 2023-03-03 PROBLEM — M86.9: Status: ACTIVE | Noted: 2023-03-03

## 2023-03-03 PROBLEM — R93.7 BONE MARROW EDEMA: Status: ACTIVE | Noted: 2023-03-03

## 2023-03-10 ENCOUNTER — OFFICE VISIT (OUTPATIENT)
Dept: ENDOCRINOLOGY | Age: 48
End: 2023-03-10
Payer: COMMERCIAL

## 2023-03-10 VITALS
TEMPERATURE: 97.1 F | SYSTOLIC BLOOD PRESSURE: 116 MMHG | BODY MASS INDEX: 37.12 KG/M2 | HEIGHT: 65 IN | WEIGHT: 222.8 LBS | OXYGEN SATURATION: 97 % | HEART RATE: 123 BPM | DIASTOLIC BLOOD PRESSURE: 80 MMHG

## 2023-03-10 DIAGNOSIS — M85.80 OSTEOPENIA, UNSPECIFIED LOCATION: Primary | ICD-10-CM

## 2023-03-10 PROCEDURE — 99203 OFFICE O/P NEW LOW 30 MIN: CPT | Performed by: INTERNAL MEDICINE

## 2023-03-10 NOTE — PROGRESS NOTES
Referring provider: Glenn Lyon Jr., MD     Reason for consult:  Ostepenia    HPI:  - 47 year old female here for osteopenia  - Prior fragility fractures: none but she did break her tibia over 20 years  - Prior/current treatments for osteoporosis:  Prolia for 2 years with last injection in 2/2023  - Risk factors for osteoporosis/bone loss:  Breast cancer on Arimidex  - Menopausal status:  10 years ago  - Family history of osteoporosis:  mother  - Vitamin D and calcium intake:  Vitamin D and calcium  - Physical activity:  Minimal due to ankle problems  - Last DXA:  8/2022 showing a most severe T-score of -1.3 in the right femoral neck  - She does complain of a left ankle soft tissue injury      The following portions of the patient's history were reviewed and updated as appropriate: allergies, current medications, past family history, past medical history, past social history, past surgical history and problem list.    Review of Systems   Constitutional: Negative.    HENT: Negative.    Eyes: Negative.    Respiratory: Negative.    Cardiovascular: Negative.    Gastrointestinal: Negative.    Musculoskeletal: Positive for arthralgias.   Neurological: Negative.    Hematological:        History of anemia   Psychiatric/Behavioral: Negative.        Objective     Vitals:    03/10/23 1036   BP: 116/80   Pulse: (!) 123   Temp: 97.1 °F (36.2 °C)   SpO2: 97%      Exam:  Gen: NAD  Head:  Atraumatic, normocephalic  Eyes:  Normal conjunctiva  Resp:  Respiratory effort normal  MSK:  Normal gait, no kyphosis  Neuro:  CN II-XII grossly intact  Psych:  Normal mood and effect    Labs/Imaging:  I reviewed her DXA scan from 8/2022 showing a most severe T-score of -1.3 in the right femoral neck    Assessment & Plan   1. Osteopenia  - Her FRAX is below the threshold for treatment so would not recommend a repeat dose of Prolia in 8/2023 when she would be do for one  - Recommend 1000 IU vitamin D3 daily, 1200 mg of dietary/supplementary  calcium, weight bearing exercise as tolerated  - Recommend repeat DXA in 8/2023 and she will return to a clinic visit after that to discuss the results

## 2023-03-10 NOTE — PROGRESS NOTES
Referring provider: Glenn Lyon Jr., MD     Reason for consult:  Ostepenia    HPI:  - 47 year old female here for osteopenia  - Interval events since last visit:  - Prior fragility fractures: none but she did break her tibia over 20 years  - Prior/current treatments for osteoporosis:  Prolia for 2 years with last injection in 2/2023  - Risk factors for osteoporosis/bone loss:  Breast cancer on Arimidex  - Menopausal status:  10 years ago  - Family history of osteoporosis:  mother  - Vitamin D and calcium intake:  Vitamin D and calcium  - Physical activity:  Minimal due to ankle problems  - Last DXA:  8/2022 showing a most severe T-score of -1.3 in the right femoral neck      {Common H&P Review Areas:50126}    Review of Systems    Objective     Vitals:    03/10/23 1036   BP: 116/80   Pulse: (!) 123   Temp: 97.1 °F (36.2 °C)   SpO2: 97%        Physical Exam    Labs/Imaging:    Assessment & Plan   There are no diagnoses linked to this encounter.

## 2023-09-05 NOTE — PROGRESS NOTES
"Chief Complaint  Stage IIA ([id number]) left breast cancer ER/RI positive, HER-2/jodi negative.  Recurrent iron deficiency, osteopenia, hepatic steatosis, left upper extremity lymphedema    Subjective        History of Present Illness  Patient returns today for a follow-up visit continuing on adjuvant Arimidex 1 mg daily with plans to complete 10 years of total endocrine therapy in late September 2023 due to her high risk disease.  The patient had been receiving Prolia every 6 months as well.  After her last visit here, with improvement in her DEXA scan, she was referred to endocrinology.  She was seen by Dr. Cortés on 3/10/2023 and recommended to stop further Prolia.  She was to have followed up with endocrinology on 9/12/2023 but has to reschedule that appointment due to a conflict.  She has continued with intermittent orthopedic issues, was experiencing difficulty with her right ankle at the last visit and was seen by orthopedics, reports that the pain spontaneously improved.  She developed worsening pain in her right knee due to osteoarthritis and was seen by orthopedics on 7/14/2023 and underwent steroid injection with improvement in her symptoms and she has been wearing a soft brace which has helped.  She does note over the past week that she has had sinus congestion and a sore throat as well as fatigue and headache.  She tested negative for COVID last week and tested again last evening and was negative.  She has been participating in the dragon boat crew, rowing with a breast cancer support group and has found this very enjoyable.  She is going to be in a competition this coming weekend.      Objective   Vital Signs:   /98   Pulse 83   Temp 98.6 °F (37 °C) (Temporal)   Ht 165.1 cm (65\")   Wt 103 kg (226 lb 1.6 oz)   SpO2 97%   BMI 37.63 kg/m²     Physical Exam  Constitutional:       Appearance: She is well-developed.   HENT:      Left Ear: Tympanic membrane normal.   Eyes:      Conjunctiva/sclera: " Conjunctivae normal.   Neck:      Thyroid: No thyromegaly.   Cardiovascular:      Rate and Rhythm: Normal rate and regular rhythm.      Heart sounds: No murmur heard.    No friction rub. No gallop.   Pulmonary:      Effort: No respiratory distress.      Breath sounds: Normal breath sounds.      Comments: Status post right reduction mammoplasty, no masses or abnormalities palpated in the right breast.  Status post left mastectomy with TRAM reconstruction.  There are some areas of probable fat necrosis palpated in the superior medial aspects of the reconstructed breast.  No concerning abnormalities identified.  Abdominal:      General: Bowel sounds are normal. There is no distension.      Palpations: Abdomen is soft.      Tenderness: There is no abdominal tenderness.   Lymphadenopathy:      Head:      Right side of head: No submandibular adenopathy.      Cervical: No cervical adenopathy.      Upper Body:      Right upper body: No supraclavicular adenopathy.      Left upper body: No supraclavicular adenopathy.   Skin:     General: Skin is warm and dry.      Findings: No rash.   Neurological:      Mental Status: She is alert and oriented to person, place, and time.      Cranial Nerves: No cranial nerve deficit.      Motor: No abnormal muscle tone.      Deep Tendon Reflexes: Reflexes normal.   Psychiatric:         Behavior: Behavior normal.          Result Review : Reviewed CBC, CMP, iron panel, ferritin from today.  Reviewed endocrinology records.       Assessment and Plan    *Stage IIA ([id number]) left breast cancer:     Status post left mastectomy with left axillary lymph node dissection 03/12/2013.  Lesion #1 invasive ductal carcinoma grade 2, 1.5 cm ER positive (90%), AK positive (1-4%), HER-2/jodi FISH negative (ratio 1.4),  Ki-67 score of 8%.  Lesion #2 invasive ductal carcinoma, grade 2, 1.7 cm,  ER positive (70%), AK negative (%),HER-2/jodi FISH positive (ratio of 3.0).  3 of 15 lymph nodes positive (2  macrometastases, 1 micrometastasis, largest lesion 2.5 cm replacing lymph node).  Lymph node evaluated and was ER positive (90%), OH positive (30%), HER-2/jodi FISH negative (ratio of 1.4).   She received adjuvant dose-dense chemotherapy with AC x4 cycles followed by weekly Taxol x12 doses completed on 08/27/2013. She did receive Herceptin in conjunction with the Taxol and then completed a total of a year with last dose of Herceptin received on 05/21/2014 (3-week dose).   She did initiate adjuvant tamoxifen on 09/25/2013 and we anticipate likely a 10-year course of treatment.   BRCA-1 and 2 testing negative at time of diagnosis.  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  Due to continued menorrhagia she underwent KATE/BSO on 6/5/18 with benign findings.    DEXA scan 10/12/18 with evidence of osteopenia with maximal T score -1.4 the lumbar spine.    Since the patient was rendered surgically postmenopausal with only mild osteopenia, she was transitioned from tamoxifen to adjuvant Arimidex on 11/21/18 with intent to complete 10 years of total adjuvant endocrine therapy (from time of initiation of tamoxifen 9/25/13).  The patient was able to undergo reconstructive therapy in late 2020.  Dr. Montgomery performed a left TRAM reconstruction on 8/4/2020, pathology from specimen was negative for malignancy.  She had difficulty with necrosis and underwent revision procedure on 8/24/2020, again with pathology negative for malignancy but showing evidence of inflammation and fat necrosis.  She underwent subsequent right reduction mammoplasty for symmetry on 11/24/2020.  Pathology from the right breast was negative for malignancy.  Additional plastic surgery procedure 2/25/2021 to excise excess tissue left lateral chest wall/axilla  The patient returns today in follow-up continuing on adjuvant Arimidex 1 mg daily.  She continues to tolerate Arimidex without any significant side effects.  The patient has no clinical  evidence of recurrent disease.  Her exam today is negative.  She will be due for her right-sided mammogram in October and we will schedule this for her.  She will be completing 10 years of adjuvant endocrine therapy later this month and we discussed discontinuation of Arimidex at that point.  We discussed that there is no evidence to support benefit of continued aromatase inhibitor adjuvant therapy beyond this timeframe.  Patient expressed understanding.  She would like to continue at this point on 6-month interval follow-up.    *Depression/anxiety with prior psychotic episode:   The patient required hospitalization in the behavioral health unit in October 2015.   She has had difficulties with anxiety and depression in the past, however, given her breast cancer diagnosis as well as stressors from her job and family, it appears that these precipitated the event.    She has stabilized and continues on Lexapro at 20 mg per day.     *Rheumatoid arthritis:   The patient was experiencing diffuse arthralgias as well as pain in her wrists right greater than left.    We did perform an evaluation 4/28/17 with negative MARYSE panel however rheumatoid factor was positive at 16.4.    She was referred to rheumatology Dr. Lopez in May 2017.   The patient was felt to have early autoimmune disorder/rheumatoid arthritis.    She was started on Plaquenil.    Symptoms have stabilized and improved over time.    Patient discontinued Plaquenil on her own sometime in 2021  Today patient reports that she has not experienced any recurrence of her arthralgias remains off of Plaquenil.  She has not followed up with rheumatology in quite some time.    *Vitamin D deficiency:   The patient has been on and off of vitamin D supplementation in the past.    Borderline low 25 hydroxy vitamin D level on 5/14/2019 at 28.3 and was taking vitamin D 400 units over-the-counter daily, subsequently increased dose to 800 units daily.    On 8/6/2019, 25 hydroxy  vitamin D level had improved to 34.8.    *Anemia with history of iron deficiency and B12 deficiency in the setting of gastric bypass:   The patient developed significant anemia with a hemoglobin down to 10.8 on 3/30/16.    Evaluation revealed iron deficiency with a ferritin of 12.5 iron percent saturation of 5 and a TIBC 535.    She started oral iron sulfate 325 mg twice a day and was tolerating this very well.  Her hemoglobin improved into the 12 range.    Her iron studies showed a significant improvement previously in October 2016 with ferritin up to 54, iron saturation 16%.    She was asked to continue on oral iron twice a day however she did not do so.    The patient also was found to have B12 deficiency with a level of 187 on 3/30/16.  She started B12 replacement and received weekly dosing ×4 followed bimonthly injections.  As it was difficult for her to come in to the office for B12 injections, she was asked to begin oral B12 1000 µg daily however she did not do this.    On 4/28/17, she was mildly anemic with a hemoglobin of 11.4.  She had a thrombocytosis as well, related to iron deficiency.  Repeat iron studies 4/28/17 showed a ferritin that declined to 42, iron saturation low at 7%, elevated TIBC at 491. She was asked to resume oral iron twice daily.  B12 level 4/28/17 had remained in the low normal range at 370 and was monitored.    On 7/14/17, B12 level was again low normal at 299 if she was asked to resume oral B12 however she did not do so.  She was intolerant of oral iron with diarrhea and abdominal cramping.    Labs on 11/8/17 showed a declining iron saturation 9% and a slight decline in ferritin down to 39 with a TIBC at the 501.  Her hemoglobin was up to 12.8.  Oral iron was discontinued 11/8/17 and she proceeded on with Feraheme on 11/15/17 and 11/22/17.  She also resumed oral B12 daily.    The patient had recurrent menorrhagia but underwent KATE/BSO on 6/5/18.    B12 level on 5/14/2019 was 1053.     On 1/17/2020, hemoglobin declined slightly to 11.7.  We did repeat her iron studies showing ferritin of 116, iron 27, iron saturation 8%, TIBC 325.  Her anemia may be related to recent gastric bypass procedure.   On 1/8/2021, hemoglobin was 12.9 with iron 51, ferritin 37.8, iron saturation 11%, TIBC 469, B12 799.  There had been gradual decline in her ferritin level and with development of low iron saturation.  She has been intolerant of oral iron and would likely not absorb the iron at this point due to her gastric bypass.  Therefore received Injectafer 750 mg IV on 1/15 and 1/22/2021.  Patient does continue on multivitamin that contains small amounts of iron and B12  Patient returns today with labs that show hemoglobin normal at 12.9.  She has fairly stable iron studies today with iron 38, ferritin 121, iron saturation that has declined slightly at 10%, TIBC 381.  Her B12 level however has declined down to 286.  We will ask her to take a specific B12 supplement 1000 mcg daily OTC and recheck level in 6 months.    *Elevated transaminases with evidence of hepatic steatosis:   The patient has had intermittent mild elevation in transaminases.    Viral hepatitis panel -5/14/2019.    Hepatic ultrasound 5/28/2019 with borderline hepatomegaly with probable mild fatty infiltration with increased echotexture.    LFTs today are normal    *Left upper extremity lymphedema:   The patient does wear her sleeve intermittently, is not wearing it today.    Lymphedema is stable to improved.    *Osteopenia:   Previous DEXA scan 3/5/14 osteopenia with maximal T score of -1.1 in the lumbar spine.    Surgically postmenopausal.    Repeat DEXA scan in 10/12/18 with maximal T score -1.4 lumbar spine showing worsening since 2014.    Aromatase inhibitor therapy initiated with Arimidex 1 mg daily on 11/20/18.    One year follow-up DEXA scan 10/18/2019 showed a 5% improvement in lumbar spine with T score 0.1, 23% decrease right femoral neck  with T score -2.1, 18% improvement in left femoral neck with T score -0.3.  With decline in bone density in the right femoral neck approaching osteoporosis, recommended that we pursue treatment at this point as she continues on aromatase inhibitor therapy.  She is not a good candidate for oral Fosamax given her difficulties with gastritis and ulcer as well as gastric bypass.  Therefore initiated Prolia on 10/31/2019.    1 year interval follow-up DEXA scan on 12/31/2020 showed evidence of osteopenia with T score lumbar spine -0.5, left hip -0.6, and right hip -1.5.  The maximal T score in the right hip improved significantly compared to value from 2019 at which time it was -2.1.  There was a slight decline in normal bone density in the lumbar spine and left hip.  DEXA scan 1/16/2023 with stable findings, evidence of osteopenia with T score lumbar spine 0.6, left hip -0.9, right hip -1.3.  Patient referred to endocrinology, was seen on 3/10/2023.  Due to improvement in the patient's bone density was recommended for her to discontinue further Prolia  Patient was to have followed up with endocrinology on 9/12/2023 in regards to her osteopenia however she has to cancel that visit due to a conflict.  I encouraged her to reschedule the visit as we would prefer for endocrinology to manage this issue in the future as she is discontinuing her aromatase inhibitor therapy.  We do not plan any further Prolia at this point.  Defer further DEXA scan monitoring to endocrinology.    *Hypercalcemia secondary to hyperparathyroidism:   The patient had hypercalcemia on 5/14/2019 with calcium 10.8, albumin 4.6.  Intact PTH was suppressed at 12.8.  Suspected to be related to exogenous calcium supplementation.   On 8/10/2022, calcium was 10.4, intact PTH was elevated at 91.6 consistent with hyperparathyroidism as well as exogenous calcium supplementation  Calcium today normal at 9.2    *Obesity:   The patient is status post gastric bypass  at [facility] in [place] on 11/13/2019.    The patient lost significant weight down from 296 down to plateau around 200 pounds.  Weight did increase over time however has plateaued, currently 226 pounds.    *Reported gastritis/gastric ulcer:   EGD performed prior to gastric bypass showed evidence of ulcer and gastritis.    She was treated with Carafate and omeprazole with follow-up EGD on 10/14/2019 that showed improvement but did also show a hiatal hernia.     *Right knee osteoarthritis  Patient with aggravation of her right knee osteoarthritis, was seen by orthopedics on 7/14/2023 and underwent steroid injection with improvement in symptoms.     PLAN:     The patient is currently continuing on adjuvant Arimidex 1 mg daily.  She will complete 10 years of adjuvant endocrine therapy in late September 2023 and we will discontinue Arimidex at that time.  Continue multivitamin daily which contains 500 mcg B12 as well as minimal amount of iron  We will ask the patient to begin dedicated OTC B12 supplement 1000 mcg daily  Per recommendations from endocrinology, we do not plan to administer any further Prolia  Patient was encouraged to reschedule follow-up with endocrinology for ongoing management of her bone density as she will be discontinuing aromatase inhibitor therapy this month  Schedule annual right mammogram in October 2023  Return in 6 months for follow-up with CBC, CMP, B12 level, stat iron panel/ferritin.     I did spend 40 minutes in total time caring for the patient today, time spent in review of records, face-to-face consultation, coordination of care, placement of orders, completion of documentation.

## 2023-09-06 ENCOUNTER — LAB (OUTPATIENT)
Dept: LAB | Facility: HOSPITAL | Age: 48
End: 2023-09-06
Payer: COMMERCIAL

## 2023-09-06 ENCOUNTER — OFFICE VISIT (OUTPATIENT)
Dept: ONCOLOGY | Facility: CLINIC | Age: 48
End: 2023-09-06
Payer: COMMERCIAL

## 2023-09-06 VITALS
DIASTOLIC BLOOD PRESSURE: 98 MMHG | SYSTOLIC BLOOD PRESSURE: 141 MMHG | WEIGHT: 226.1 LBS | OXYGEN SATURATION: 97 % | BODY MASS INDEX: 37.67 KG/M2 | HEART RATE: 83 BPM | HEIGHT: 65 IN | TEMPERATURE: 98.6 F

## 2023-09-06 DIAGNOSIS — C50.812 MALIGNANT NEOPLASM OF OVERLAPPING SITES OF LEFT BREAST IN FEMALE, ESTROGEN RECEPTOR POSITIVE: Primary | ICD-10-CM

## 2023-09-06 DIAGNOSIS — M85.89 OSTEOPENIA OF MULTIPLE SITES: ICD-10-CM

## 2023-09-06 DIAGNOSIS — D51.8 OTHER VITAMIN B12 DEFICIENCY ANEMIA: ICD-10-CM

## 2023-09-06 DIAGNOSIS — D50.8 OTHER IRON DEFICIENCY ANEMIA: ICD-10-CM

## 2023-09-06 DIAGNOSIS — Z17.0 MALIGNANT NEOPLASM OF OVERLAPPING SITES OF LEFT BREAST IN FEMALE, ESTROGEN RECEPTOR POSITIVE: Primary | ICD-10-CM

## 2023-09-06 LAB
ALBUMIN SERPL-MCNC: 4.2 G/DL (ref 3.5–5.2)
ALBUMIN/GLOB SERPL: 1.6 G/DL
ALP SERPL-CCNC: 120 U/L (ref 39–117)
ALT SERPL W P-5'-P-CCNC: 19 U/L (ref 1–33)
ANION GAP SERPL CALCULATED.3IONS-SCNC: 13.9 MMOL/L (ref 5–15)
AST SERPL-CCNC: 20 U/L (ref 1–32)
BASOPHILS # BLD AUTO: 0.07 10*3/MM3 (ref 0–0.2)
BASOPHILS NFR BLD AUTO: 0.8 % (ref 0–1.5)
BILIRUB SERPL-MCNC: 0.2 MG/DL (ref 0–1.2)
BUN SERPL-MCNC: 15 MG/DL (ref 6–20)
BUN/CREAT SERPL: 22.4 (ref 7–25)
CALCIUM SPEC-SCNC: 9.2 MG/DL (ref 8.6–10.5)
CHLORIDE SERPL-SCNC: 104 MMOL/L (ref 98–107)
CO2 SERPL-SCNC: 23.1 MMOL/L (ref 22–29)
CREAT SERPL-MCNC: 0.67 MG/DL (ref 0.6–1.1)
DEPRECATED RDW RBC AUTO: 43.2 FL (ref 37–54)
EGFRCR SERPLBLD CKD-EPI 2021: 106.6 ML/MIN/1.73
EOSINOPHIL # BLD AUTO: 0.39 10*3/MM3 (ref 0–0.4)
EOSINOPHIL NFR BLD AUTO: 4.7 % (ref 0.3–6.2)
ERYTHROCYTE [DISTWIDTH] IN BLOOD BY AUTOMATED COUNT: 12.5 % (ref 12.3–15.4)
FERRITIN SERPL-MCNC: 121 NG/ML (ref 13–150)
GLOBULIN UR ELPH-MCNC: 2.7 GM/DL
GLUCOSE SERPL-MCNC: 112 MG/DL (ref 65–99)
HCT VFR BLD AUTO: 40.2 % (ref 34–46.6)
HGB BLD-MCNC: 12.9 G/DL (ref 12–15.9)
IMM GRANULOCYTES # BLD AUTO: 0.03 10*3/MM3 (ref 0–0.05)
IMM GRANULOCYTES NFR BLD AUTO: 0.4 % (ref 0–0.5)
IRON 24H UR-MRATE: 38 MCG/DL (ref 37–145)
IRON SATN MFR SERPL: 10 % (ref 20–50)
LYMPHOCYTES # BLD AUTO: 2.27 10*3/MM3 (ref 0.7–3.1)
LYMPHOCYTES NFR BLD AUTO: 27.4 % (ref 19.6–45.3)
MAGNESIUM SERPL-MCNC: 1.9 MG/DL (ref 1.6–2.6)
MCH RBC QN AUTO: 30.4 PG (ref 26.6–33)
MCHC RBC AUTO-ENTMCNC: 32.1 G/DL (ref 31.5–35.7)
MCV RBC AUTO: 94.6 FL (ref 79–97)
MONOCYTES # BLD AUTO: 0.44 10*3/MM3 (ref 0.1–0.9)
MONOCYTES NFR BLD AUTO: 5.3 % (ref 5–12)
NEUTROPHILS NFR BLD AUTO: 5.07 10*3/MM3 (ref 1.7–7)
NEUTROPHILS NFR BLD AUTO: 61.4 % (ref 42.7–76)
NRBC BLD AUTO-RTO: 0 /100 WBC (ref 0–0.2)
PHOSPHATE SERPL-MCNC: 4.2 MG/DL (ref 2.5–4.5)
PLATELET # BLD AUTO: 369 10*3/MM3 (ref 140–450)
PMV BLD AUTO: 10.1 FL (ref 6–12)
POTASSIUM SERPL-SCNC: 3.5 MMOL/L (ref 3.5–5.2)
PROT SERPL-MCNC: 6.9 G/DL (ref 6–8.5)
RBC # BLD AUTO: 4.25 10*6/MM3 (ref 3.77–5.28)
SODIUM SERPL-SCNC: 141 MMOL/L (ref 136–145)
TIBC SERPL-MCNC: 371 MCG/DL (ref 298–536)
TRANSFERRIN SERPL-MCNC: 265 MG/DL (ref 200–360)
VIT B12 BLD-MCNC: 286 PG/ML (ref 211–946)
WBC NRBC COR # BLD: 8.27 10*3/MM3 (ref 3.4–10.8)

## 2023-09-06 PROCEDURE — 36415 COLL VENOUS BLD VENIPUNCTURE: CPT

## 2023-09-06 PROCEDURE — 84466 ASSAY OF TRANSFERRIN: CPT

## 2023-09-06 PROCEDURE — 82728 ASSAY OF FERRITIN: CPT

## 2023-09-06 PROCEDURE — 83735 ASSAY OF MAGNESIUM: CPT

## 2023-09-06 PROCEDURE — 85025 COMPLETE CBC W/AUTO DIFF WBC: CPT

## 2023-09-06 PROCEDURE — 83540 ASSAY OF IRON: CPT

## 2023-09-06 PROCEDURE — 82607 VITAMIN B-12: CPT | Performed by: INTERNAL MEDICINE

## 2023-09-06 PROCEDURE — 80053 COMPREHEN METABOLIC PANEL: CPT

## 2023-09-06 PROCEDURE — 84100 ASSAY OF PHOSPHORUS: CPT

## 2023-09-06 NOTE — LETTER
September 6, 2023     JUAN DANIEL Reyes  [address] [place] [address]    Patient: Yu Thakur   YOB: 1975   Date of Visit: 9/6/2023     Dear JUAN DANIEL Reyes:       Thank you for referring Yu Thakur to me for evaluation. Below are the relevant portions of my assessment and plan of care.    If you have questions, please do not hesitate to call me. I look forward to following Yu along with you.         Sincerely,        Glenn Lyon MD        CC: Nicolas Cortés, Glenn Benitez Jr., MD  09/06/23 3716  Sign when Signing Visit  Chief Complaint  Stage IIA ([id number]) left breast cancer ER/NM positive, HER-2/jodi negative.  Recurrent iron deficiency, osteopenia, hepatic steatosis, left upper extremity lymphedema    Subjective        History of Present Illness  Patient returns today for a follow-up visit continuing on adjuvant Arimidex 1 mg daily with plans to complete 10 years of total endocrine therapy in late September 2023 due to her high risk disease.  The patient had been receiving Prolia every 6 months as well.  After her last visit here, with improvement in her DEXA scan, she was referred to endocrinology.  She was seen by Dr. Cortés on 3/10/2023 and recommended to stop further Prolia.  She was to have followed up with endocrinology on 9/12/2023 but has to reschedule that appointment due to a conflict.  She has continued with intermittent orthopedic issues, was experiencing difficulty with her right ankle at the last visit and was seen by orthopedics, reports that the pain spontaneously improved.  She developed worsening pain in her right knee due to osteoarthritis and was seen by orthopedics on 7/14/2023 and underwent steroid injection with improvement in her symptoms and she has been wearing a soft brace which has helped.  She does note over the past week that she has had sinus congestion and a sore throat as well as fatigue and headache.  She tested negative for  "COVID last week and tested again last evening and was negative.  She has been participating in the dragon boat crew, rowing with a breast cancer support group and has found this very enjoyable.  She is going to be in a competition this coming weekend.      Objective   Vital Signs:   /98   Pulse 83   Temp 98.6 °F (37 °C) (Temporal)   Ht 165.1 cm (65\")   Wt 103 kg (226 lb 1.6 oz)   SpO2 97%   BMI 37.63 kg/m²     Physical Exam  Constitutional:       Appearance: She is well-developed.   HENT:      Left Ear: Tympanic membrane normal.   Eyes:      Conjunctiva/sclera: Conjunctivae normal.   Neck:      Thyroid: No thyromegaly.   Cardiovascular:      Rate and Rhythm: Normal rate and regular rhythm.      Heart sounds: No murmur heard.    No friction rub. No gallop.   Pulmonary:      Effort: No respiratory distress.      Breath sounds: Normal breath sounds.      Comments: Status post right reduction mammoplasty, no masses or abnormalities palpated in the right breast.  Status post left mastectomy with TRAM reconstruction.  There are some areas of probable fat necrosis palpated in the superior medial aspects of the reconstructed breast.  No concerning abnormalities identified.  Abdominal:      General: Bowel sounds are normal. There is no distension.      Palpations: Abdomen is soft.      Tenderness: There is no abdominal tenderness.   Lymphadenopathy:      Head:      Right side of head: No submandibular adenopathy.      Cervical: No cervical adenopathy.      Upper Body:      Right upper body: No supraclavicular adenopathy.      Left upper body: No supraclavicular adenopathy.   Skin:     General: Skin is warm and dry.      Findings: No rash.   Neurological:      Mental Status: She is alert and oriented to person, place, and time.      Cranial Nerves: No cranial nerve deficit.      Motor: No abnormal muscle tone.      Deep Tendon Reflexes: Reflexes normal.   Psychiatric:         Behavior: Behavior normal.        "   Result Review : Reviewed CBC, CMP, iron panel, ferritin from today.  Reviewed endocrinology records.       Assessment and Plan    *Stage IIA ([id number]) left breast cancer:     Status post left mastectomy with left axillary lymph node dissection 03/12/2013.  Lesion #1 invasive ductal carcinoma grade 2, 1.5 cm ER positive (90%), MI positive (1-4%), HER-2/jodi FISH negative (ratio 1.4),  Ki-67 score of 8%.  Lesion #2 invasive ductal carcinoma, grade 2, 1.7 cm,  ER positive (70%), MI negative (%),HER-2/jodi FISH positive (ratio of 3.0).  3 of 15 lymph nodes positive (2 macrometastases, 1 micrometastasis, largest lesion 2.5 cm replacing lymph node).  Lymph node evaluated and was ER positive (90%), MI positive (30%), HER-2/jodi FISH negative (ratio of 1.4).   She received adjuvant dose-dense chemotherapy with AC x4 cycles followed by weekly Taxol x12 doses completed on 08/27/2013. She did receive Herceptin in conjunction with the Taxol and then completed a total of a year with last dose of Herceptin received on 05/21/2014 (3-week dose).   She did initiate adjuvant tamoxifen on 09/25/2013 and we anticipate likely a 10-year course of treatment.   BRCA-1 and 2 testing negative at time of diagnosis.  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  Due to continued menorrhagia she underwent KATE/BSO on 6/5/18 with benign findings.    DEXA scan 10/12/18 with evidence of osteopenia with maximal T score -1.4 the lumbar spine.    Since the patient was rendered surgically postmenopausal with only mild osteopenia, she was transitioned from tamoxifen to adjuvant Arimidex on 11/21/18 with intent to complete 10 years of total adjuvant endocrine therapy (from time of initiation of tamoxifen 9/25/13).  The patient was able to undergo reconstructive therapy in late 2020.  Dr. Montgomery performed a left TRAM reconstruction on 8/4/2020, pathology from specimen was negative for malignancy.  She had difficulty with necrosis and  underwent revision procedure on 8/24/2020, again with pathology negative for malignancy but showing evidence of inflammation and fat necrosis.  She underwent subsequent right reduction mammoplasty for symmetry on 11/24/2020.  Pathology from the right breast was negative for malignancy.  Additional plastic surgery procedure 2/25/2021 to excise excess tissue left lateral chest wall/axilla  The patient returns today in follow-up continuing on adjuvant Arimidex 1 mg daily.  She continues to tolerate Arimidex without any significant side effects.  The patient has no clinical evidence of recurrent disease.  Her exam today is negative.  She will be due for her right-sided mammogram in October and we will schedule this for her.  She will be completing 10 years of adjuvant endocrine therapy later this month and we discussed discontinuation of Arimidex at that point.  We discussed that there is no evidence to support benefit of continued aromatase inhibitor adjuvant therapy beyond this timeframe.  Patient expressed understanding.  She would like to continue at this point on 6-month interval follow-up.    *Depression/anxiety with prior psychotic episode:   The patient required hospitalization in the behavioral health unit in October 2015.   She has had difficulties with anxiety and depression in the past, however, given her breast cancer diagnosis as well as stressors from her job and family, it appears that these precipitated the event.    She has stabilized and continues on Lexapro at 20 mg per day.     *Rheumatoid arthritis:   The patient was experiencing diffuse arthralgias as well as pain in her wrists right greater than left.    We did perform an evaluation 4/28/17 with negative MARYSE panel however rheumatoid factor was positive at 16.4.    She was referred to rheumatology Dr. Lopez in May 2017.   The patient was felt to have early autoimmune disorder/rheumatoid arthritis.    She was started on Plaquenil.    Symptoms have  stabilized and improved over time.    Patient discontinued Plaquenil on her own sometime in 2021  Today patient reports that she has not experienced any recurrence of her arthralgias remains off of Plaquenil.  She has not followed up with rheumatology in quite some time.    *Vitamin D deficiency:   The patient has been on and off of vitamin D supplementation in the past.    Borderline low 25 hydroxy vitamin D level on 5/14/2019 at 28.3 and was taking vitamin D 400 units over-the-counter daily, subsequently increased dose to 800 units daily.    On 8/6/2019, 25 hydroxy vitamin D level had improved to 34.8.    *Anemia with history of iron deficiency and B12 deficiency in the setting of gastric bypass:   The patient developed significant anemia with a hemoglobin down to 10.8 on 3/30/16.    Evaluation revealed iron deficiency with a ferritin of 12.5 iron percent saturation of 5 and a TIBC 535.    She started oral iron sulfate 325 mg twice a day and was tolerating this very well.  Her hemoglobin improved into the 12 range.    Her iron studies showed a significant improvement previously in October 2016 with ferritin up to 54, iron saturation 16%.    She was asked to continue on oral iron twice a day however she did not do so.    The patient also was found to have B12 deficiency with a level of 187 on 3/30/16.  She started B12 replacement and received weekly dosing ×4 followed bimonthly injections.  As it was difficult for her to come in to the office for B12 injections, she was asked to begin oral B12 1000 µg daily however she did not do this.    On 4/28/17, she was mildly anemic with a hemoglobin of 11.4.  She had a thrombocytosis as well, related to iron deficiency.  Repeat iron studies 4/28/17 showed a ferritin that declined to 42, iron saturation low at 7%, elevated TIBC at 491. She was asked to resume oral iron twice daily.  B12 level 4/28/17 had remained in the low normal range at 370 and was monitored.    On  7/14/17, B12 level was again low normal at 299 if she was asked to resume oral B12 however she did not do so.  She was intolerant of oral iron with diarrhea and abdominal cramping.    Labs on 11/8/17 showed a declining iron saturation 9% and a slight decline in ferritin down to 39 with a TIBC at the 501.  Her hemoglobin was up to 12.8.  Oral iron was discontinued 11/8/17 and she proceeded on with Feraheme on 11/15/17 and 11/22/17.  She also resumed oral B12 daily.    The patient had recurrent menorrhagia but underwent KATE/BSO on 6/5/18.    B12 level on 5/14/2019 was 1053.    On 1/17/2020, hemoglobin declined slightly to 11.7.  We did repeat her iron studies showing ferritin of 116, iron 27, iron saturation 8%, TIBC 325.  Her anemia may be related to recent gastric bypass procedure.   On 1/8/2021, hemoglobin was 12.9 with iron 51, ferritin 37.8, iron saturation 11%, TIBC 469, B12 799.  There had been gradual decline in her ferritin level and with development of low iron saturation.  She has been intolerant of oral iron and would likely not absorb the iron at this point due to her gastric bypass.  Therefore received Injectafer 750 mg IV on 1/15 and 1/22/2021.  Patient does continue on multivitamin that contains small amounts of iron and B12  Patient returns today with labs that show hemoglobin normal at 12.9.  She has fairly stable iron studies today with iron 38, ferritin 121, iron saturation that has declined slightly at 10%, TIBC 381.  Her B12 level however has declined down to 286.  We will ask her to take a specific B12 supplement 1000 mcg daily OTC and recheck level in 6 months.    *Elevated transaminases with evidence of hepatic steatosis:   The patient has had intermittent mild elevation in transaminases.    Viral hepatitis panel -5/14/2019.    Hepatic ultrasound 5/28/2019 with borderline hepatomegaly with probable mild fatty infiltration with increased echotexture.    LFTs today are normal    *Left upper  extremity lymphedema:   The patient does wear her sleeve intermittently, is not wearing it today.    Lymphedema is stable to improved.    *Osteopenia:   Previous DEXA scan 3/5/14 osteopenia with maximal T score of -1.1 in the lumbar spine.    Surgically postmenopausal.    Repeat DEXA scan in 10/12/18 with maximal T score -1.4 lumbar spine showing worsening since 2014.    Aromatase inhibitor therapy initiated with Arimidex 1 mg daily on 11/20/18.    One year follow-up DEXA scan 10/18/2019 showed a 5% improvement in lumbar spine with T score 0.1, 23% decrease right femoral neck with T score -2.1, 18% improvement in left femoral neck with T score -0.3.  With decline in bone density in the right femoral neck approaching osteoporosis, recommended that we pursue treatment at this point as she continues on aromatase inhibitor therapy.  She is not a good candidate for oral Fosamax given her difficulties with gastritis and ulcer as well as gastric bypass.  Therefore initiated Prolia on 10/31/2019.    1 year interval follow-up DEXA scan on 12/31/2020 showed evidence of osteopenia with T score lumbar spine -0.5, left hip -0.6, and right hip -1.5.  The maximal T score in the right hip improved significantly compared to value from 2019 at which time it was -2.1.  There was a slight decline in normal bone density in the lumbar spine and left hip.  DEXA scan 1/16/2023 with stable findings, evidence of osteopenia with T score lumbar spine 0.6, left hip -0.9, right hip -1.3.  Patient referred to endocrinology, was seen on 3/10/2023.  Due to improvement in the patient's bone density was recommended for her to discontinue further Prolia  Patient was to have followed up with endocrinology on 9/12/2023 in regards to her osteopenia however she has to cancel that visit due to a conflict.  I encouraged her to reschedule the visit as we would prefer for endocrinology to manage this issue in the future as she is discontinuing her aromatase  inhibitor therapy.  We do not plan any further Prolia at this point.  Defer further DEXA scan monitoring to endocrinology.    *Hypercalcemia secondary to hyperparathyroidism:   The patient had hypercalcemia on 5/14/2019 with calcium 10.8, albumin 4.6.  Intact PTH was suppressed at 12.8.  Suspected to be related to exogenous calcium supplementation.   On 8/10/2022, calcium was 10.4, intact PTH was elevated at 91.6 consistent with hyperparathyroidism as well as exogenous calcium supplementation  Calcium today normal at 9.2    *Obesity:   The patient is status post gastric bypass at [facility] in [place] on 11/13/2019.    The patient lost significant weight down from 296 down to plateau around 200 pounds.  Weight did increase over time however has plateaued, currently 226 pounds.    *Reported gastritis/gastric ulcer:   EGD performed prior to gastric bypass showed evidence of ulcer and gastritis.    She was treated with Carafate and omeprazole with follow-up EGD on 10/14/2019 that showed improvement but did also show a hiatal hernia.     *Right knee osteoarthritis  Patient with aggravation of her right knee osteoarthritis, was seen by orthopedics on 7/14/2023 and underwent steroid injection with improvement in symptoms.     PLAN:     The patient is currently continuing on adjuvant Arimidex 1 mg daily.  She will complete 10 years of adjuvant endocrine therapy in late September 2023 and we will discontinue Arimidex at that time.  Continue multivitamin daily which contains 500 mcg B12 as well as minimal amount of iron  We will ask the patient to begin dedicated OTC B12 supplement 1000 mcg daily  Per recommendations from endocrinology, we do not plan to administer any further Prolia  Patient was encouraged to reschedule follow-up with endocrinology for ongoing management of her bone density as she will be discontinuing aromatase inhibitor therapy this month  Schedule annual right mammogram in October 2023  Return in  6 months for follow-up with CBC, CMP, B12 level, stat iron panel/ferritin.     I did spend 40 minutes in total time caring for the patient today, time spent in review of records, face-to-face consultation, coordination of care, placement of orders, completion of documentation.

## 2023-09-08 ENCOUNTER — TELEPHONE (OUTPATIENT)
Dept: ONCOLOGY | Facility: CLINIC | Age: 48
End: 2023-09-08
Payer: COMMERCIAL

## 2023-09-08 NOTE — TELEPHONE ENCOUNTER
Left message for patient informing her that her B12 level has been declining. Per Dr. Lyon, patient should begin taking oral OTC B12 supplement 1000 mcg daily. Direct callback number provided in case of questions.

## 2023-10-25 ENCOUNTER — APPOINTMENT (OUTPATIENT)
Dept: WOMENS IMAGING | Facility: HOSPITAL | Age: 48
End: 2023-10-25
Payer: COMMERCIAL

## 2023-10-25 PROCEDURE — 77067 SCR MAMMO BI INCL CAD: CPT | Performed by: RADIOLOGY

## 2023-10-25 PROCEDURE — 77063 BREAST TOMOSYNTHESIS BI: CPT | Performed by: RADIOLOGY

## 2024-04-23 NOTE — PROGRESS NOTES
"Chief Complaint  Stage IIA ([id number]) left breast cancer ER/HI positive, HER-2/jodi negative.  Recurrent iron deficiency, osteopenia, hepatic steatosis, left upper extremity lymphedema    Subjective        History of Present Illness  Patient returns today in follow-up continuing course of observation/surveillance after completion of 10 years adjuvant endocrine therapy with anastrozole in late September 2024 following her last visit here.  She has requested to remain on a 6-month interval follow-up basis for now.  She did undergo her annual right-sided mammogram on 10/25/2023 which was negative, BI-RADS 1.  She has been doing quite well overall, is somewhat fatigued towards the end of the school year.  She just had her first grandchild around 6 weeks ago.  She has no other physical complaints, has been doing very well overall.  She does continue on sublingual B12, is unsure what dose she is taking but is compliant with this daily.  She did not follow-up with endocrinology as requested in regards to her osteopenia and further management      Objective   Vital Signs:   /85   Pulse 103   Temp 98.2 °F (36.8 °C) (Temporal)   Resp 18   Ht 165.1 cm (65\")   Wt 104 kg (229 lb 12.8 oz)   SpO2 96%   BMI 38.24 kg/m²     Physical Exam  Constitutional:       Appearance: She is well-developed.   HENT:      Left Ear: Tympanic membrane normal.   Eyes:      Conjunctiva/sclera: Conjunctivae normal.   Neck:      Thyroid: No thyromegaly.   Cardiovascular:      Rate and Rhythm: Normal rate and regular rhythm.      Heart sounds: No murmur heard.     No friction rub. No gallop.   Pulmonary:      Effort: No respiratory distress.      Breath sounds: Normal breath sounds.      Comments: Status post right reduction mammoplasty, no masses or abnormalities palpated in the right breast.  Status post left mastectomy with TRAM reconstruction.  There are some areas of probable fat necrosis palpated in the superior medial aspects of the " reconstructed breast.  No concerning abnormalities identified.  Abdominal:      General: Bowel sounds are normal. There is no distension.      Palpations: Abdomen is soft.      Tenderness: There is no abdominal tenderness.   Lymphadenopathy:      Head:      Right side of head: No submandibular adenopathy.      Cervical: No cervical adenopathy.      Upper Body:      Right upper body: No supraclavicular adenopathy.      Left upper body: No supraclavicular adenopathy.   Skin:     General: Skin is warm and dry.      Findings: No rash.   Neurological:      Mental Status: She is alert and oriented to person, place, and time.      Cranial Nerves: No cranial nerve deficit.      Motor: No abnormal muscle tone.      Deep Tendon Reflexes: Reflexes normal.   Psychiatric:         Behavior: Behavior normal.     Patient was examined today, unchanged from above       Result Review : Reviewed CBC, CMP, iron panel, ferritin from today.  Reviewed mammogram 10/25/2024       Assessment and Plan    *Stage IIA ([id number]) left breast cancer:     Status post left mastectomy with left axillary lymph node dissection 03/12/2013.  Lesion #1 invasive ductal carcinoma grade 2, 1.5 cm ER positive (90%), NV positive (1-4%), HER-2/jodi FISH negative (ratio 1.4),  Ki-67 score of 8%.  Lesion #2 invasive ductal carcinoma, grade 2, 1.7 cm,  ER positive (70%), NV negative (%),HER-2/jodi FISH positive (ratio of 3.0).  3 of 15 lymph nodes positive (2 macrometastases, 1 micrometastasis, largest lesion 2.5 cm replacing lymph node).  Lymph node evaluated and was ER positive (90%), NV positive (30%), HER-2/jodi FISH negative (ratio of 1.4).   She received adjuvant dose-dense chemotherapy with AC x4 cycles followed by weekly Taxol x12 doses completed on 08/27/2013. She did receive Herceptin in conjunction with the Taxol and then completed a total of a year with last dose of Herceptin received on 05/21/2014 (3-week dose).   She did initiate adjuvant tamoxifen on  09/25/2013 and we anticipate likely a 10-year course of treatment.   BRCA-1 and 2 testing negative at time of diagnosis.  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  Due to continued menorrhagia she underwent KATE/BSO on 6/5/18 with benign findings.    DEXA scan 10/12/18 with evidence of osteopenia with maximal T score -1.4 the lumbar spine.    Since the patient was rendered surgically postmenopausal with only mild osteopenia, she was transitioned from tamoxifen to adjuvant Arimidex on 11/21/18 with intent to complete 10 years of total adjuvant endocrine therapy (from time of initiation of tamoxifen 9/25/13).  The patient was able to undergo reconstructive therapy in late 2020.  Dr. Montgomery performed a left TRAM reconstruction on 8/4/2020, pathology from specimen was negative for malignancy.  She had difficulty with necrosis and underwent revision procedure on 8/24/2020, again with pathology negative for malignancy but showing evidence of inflammation and fat necrosis.  She underwent subsequent right reduction mammoplasty for symmetry on 11/24/2020.  Pathology from the right breast was negative for malignancy.  Additional plastic surgery procedure 2/25/2021 to excise excess tissue left lateral chest wall/axilla  Patient discontinued adjuvant Arimidex in late September 2023 after completion of 10 years adjuvant endocrine therapy.  Patient returns today for a 6-month interval follow-up visit.  She has remained off of adjuvant Arimidex since late September when she completed 10 years of treatment.  She is doing well at this time, no clinical evidence of recurrent disease.  Her exam today was negative.  She underwent mammogram on 10/25/2023 which was negative, BI-RADS 1.  Will schedule her next mammogram in October 2024.  She would like to maintain every 6-month interval follow-up in our office for now.  We did discuss option to transition to 1 year interval follow-up visit in the  future.    *Depression/anxiety with prior psychotic episode:   The patient required hospitalization in the behavioral health unit in October 2015.   She has had difficulties with anxiety and depression in the past, however, given her breast cancer diagnosis as well as stressors from her job and family, it appears that these precipitated the event.    She has stabilized and continues on Lexapro at 20 mg per day.     *Rheumatoid arthritis:   The patient was experiencing diffuse arthralgias as well as pain in her wrists right greater than left.    We did perform an evaluation 4/28/17 with negative MARYSE panel however rheumatoid factor was positive at 16.4.    She was referred to rheumatology Dr. Lopez in May 2017.   The patient was felt to have early autoimmune disorder/rheumatoid arthritis.    She was started on Plaquenil.    Symptoms have stabilized and improved over time.    Patient discontinued Plaquenil on her own sometime in 2021  Patient has not experienced any recurrence of her arthralgias remains off of Plaquenil.  She has not followed up with rheumatology in quite some time.    *Vitamin D deficiency:   The patient has been on and off of vitamin D supplementation in the past.    Borderline low 25 hydroxy vitamin D level on 5/14/2019 at 28.3 and was taking vitamin D 400 units over-the-counter daily, subsequently increased dose to 800 units daily.    On 8/6/2019, 25 hydroxy vitamin D level had improved to 34.8.    *Anemia with history of iron deficiency and B12 deficiency in the setting of gastric bypass:   The patient developed significant anemia with a hemoglobin down to 10.8 on 3/30/16.    Evaluation revealed iron deficiency with a ferritin of 12.5 iron percent saturation of 5 and a TIBC 535.    She started oral iron sulfate 325 mg twice a day and was tolerating this very well.  Her hemoglobin improved into the 12 range.    Her iron studies showed a significant improvement previously in October 2016 with  ferritin up to 54, iron saturation 16%.    She was asked to continue on oral iron twice a day however she did not do so.    The patient also was found to have B12 deficiency with a level of 187 on 3/30/16.  She started B12 replacement and received weekly dosing ×4 followed bimonthly injections.  As it was difficult for her to come in to the office for B12 injections, she was asked to begin oral B12 1000 µg daily however she did not do this.    On 4/28/17, she was mildly anemic with a hemoglobin of 11.4.  She had a thrombocytosis as well, related to iron deficiency.  Repeat iron studies 4/28/17 showed a ferritin that declined to 42, iron saturation low at 7%, elevated TIBC at 491. She was asked to resume oral iron twice daily.  B12 level 4/28/17 had remained in the low normal range at 370 and was monitored.    On 7/14/17, B12 level was again low normal at 299 if she was asked to resume oral B12 however she did not do so.  She was intolerant of oral iron with diarrhea and abdominal cramping.    Labs on 11/8/17 showed a declining iron saturation 9% and a slight decline in ferritin down to 39 with a TIBC at the 501.  Her hemoglobin was up to 12.8.  Oral iron was discontinued 11/8/17 and she proceeded on with Feraheme on 11/15/17 and 11/22/17.  She also resumed oral B12 daily.    The patient had recurrent menorrhagia but underwent KATE/BSO on 6/5/18.    B12 level on 5/14/2019 was 1053.    On 1/17/2020, hemoglobin declined slightly to 11.7.  We did repeat her iron studies showing ferritin of 116, iron 27, iron saturation 8%, TIBC 325.  Her anemia may be related to recent gastric bypass procedure.   On 1/8/2021, hemoglobin was 12.9 with iron 51, ferritin 37.8, iron saturation 11%, TIBC 469, B12 799.  There had been gradual decline in her ferritin level and with development of low iron saturation.  She has been intolerant of oral iron and would likely not absorb the iron at this point due to her gastric bypass.  Therefore  received Injectafer 750 mg IV on 1/15 and 1/22/2021.  Patient does continue on multivitamin that contains small amounts of iron and B12  Patient initiated B12 1000 mcg sublingual on 9/6/2023  Labs today with hemoglobin that remains normal at 13.1.  Iron studies show slight decline with iron 38, ferritin 92.7, iron saturation 10%, TIBC 396.  She has undergone previous gastric bypass and we will need to continue monitoring her iron status with potential she may require further IV iron in the future if studies declined significantly.  She does continue on B12 supplementation 1000 mcg sublingual daily with B12 level that is improved significantly up to greater than 2000 today.    *Elevated transaminases with evidence of hepatic steatosis:   The patient has had intermittent mild elevation in transaminases.    Viral hepatitis panel -5/14/2019.    Hepatic ultrasound 5/28/2019 with borderline hepatomegaly with probable mild fatty infiltration with increased echotexture.    LFTs today are normal    *Left upper extremity lymphedema:   The patient does wear her sleeve intermittently, is not wearing it today.    Lymphedema is stable to improved.    *Osteopenia:   Previous DEXA scan 3/5/14 osteopenia with maximal T score of -1.1 in the lumbar spine.    Surgically postmenopausal.    Repeat DEXA scan in 10/12/18 with maximal T score -1.4 lumbar spine showing worsening since 2014.    Aromatase inhibitor therapy initiated with Arimidex 1 mg daily on 11/20/18.    One year follow-up DEXA scan 10/18/2019 showed a 5% improvement in lumbar spine with T score 0.1, 23% decrease right femoral neck with T score -2.1, 18% improvement in left femoral neck with T score -0.3.  With decline in bone density in the right femoral neck approaching osteoporosis, recommended that we pursue treatment at this point as she continues on aromatase inhibitor therapy.  She is not a good candidate for oral Fosamax given her difficulties with gastritis and  ulcer as well as gastric bypass.  Therefore initiated Prolia on 10/31/2019.    1 year interval follow-up DEXA scan on 12/31/2020 showed evidence of osteopenia with T score lumbar spine -0.5, left hip -0.6, and right hip -1.5.  The maximal T score in the right hip improved significantly compared to value from 2019 at which time it was -2.1.  There was a slight decline in normal bone density in the lumbar spine and left hip.  DEXA scan 1/16/2023 with stable findings, evidence of osteopenia with T score lumbar spine 0.6, left hip -0.9, right hip -1.3.  Patient referred to endocrinology, was seen on 3/10/2023.  Due to improvement in the patient's bone density was recommended for her to discontinue further Prolia  Patient was to have followed up with endocrinology on 9/12/2023 in regards to her osteopenia however she had to cancel the visit and is never rescheduled.  We will refer her back to endocrinology with request that they manage her osteopenia at this point since she is now off of aromatase inhibitor therapy.    *Hypercalcemia secondary to hyperparathyroidism:   The patient had hypercalcemia on 5/14/2019 with calcium 10.8, albumin 4.6.  Intact PTH was suppressed at 12.8.  Suspected to be related to exogenous calcium supplementation.   On 8/10/2022, calcium was 10.4, intact PTH was elevated at 91.6 consistent with hyperparathyroidism as well as exogenous calcium supplementation  Calcium today normal at 9.3    *Obesity:   The patient is status post gastric bypass at [facility] in [place] on 11/13/2019.    The patient lost significant weight down from 296 down to plateau around 200 pounds.  Weight did increase over time however has plateaued, currently 229 pounds.    *Reported gastritis/gastric ulcer:   EGD performed prior to gastric bypass showed evidence of ulcer and gastritis.    She was treated with Carafate and omeprazole with follow-up EGD on 10/14/2019 that showed improvement but did also show a hiatal  hernia.     *Right knee osteoarthritis  Patient with aggravation of her right knee osteoarthritis, was seen by orthopedics on 7/14/2023 and underwent steroid injection with improvement in symptoms.     PLAN:     Patient continues on a course of observation/surveillance in regards to her history of left breast cancer with no clinical evidence of recurrent disease.  She completed 10 years adjuvant endocrine therapy with anastrozole in late September 2023.  Continue multivitamin daily which contains 500 mcg B12 as well as minimal amount of iron  Continue OTC SUBLINGUAL B12 supplement 1000 mcg daily  Referral back to endocrinology for further ongoing management of osteopenia  Schedule annual right mammogram in October 2024  MD visit in 6 months with CBC, CMP, B12 level, stat iron panel/ferritin

## 2024-04-24 ENCOUNTER — OFFICE VISIT (OUTPATIENT)
Dept: ONCOLOGY | Facility: CLINIC | Age: 49
End: 2024-04-24
Payer: COMMERCIAL

## 2024-04-24 ENCOUNTER — LAB (OUTPATIENT)
Dept: LAB | Facility: HOSPITAL | Age: 49
End: 2024-04-24
Payer: COMMERCIAL

## 2024-04-24 VITALS
RESPIRATION RATE: 18 BRPM | OXYGEN SATURATION: 96 % | BODY MASS INDEX: 38.29 KG/M2 | TEMPERATURE: 98.2 F | DIASTOLIC BLOOD PRESSURE: 85 MMHG | WEIGHT: 229.8 LBS | HEIGHT: 65 IN | HEART RATE: 103 BPM | SYSTOLIC BLOOD PRESSURE: 125 MMHG

## 2024-04-24 DIAGNOSIS — Z17.0 MALIGNANT NEOPLASM OF OVERLAPPING SITES OF LEFT BREAST IN FEMALE, ESTROGEN RECEPTOR POSITIVE: ICD-10-CM

## 2024-04-24 DIAGNOSIS — C50.812 MALIGNANT NEOPLASM OF OVERLAPPING SITES OF LEFT BREAST IN FEMALE, ESTROGEN RECEPTOR POSITIVE: ICD-10-CM

## 2024-04-24 DIAGNOSIS — Z17.0 MALIGNANT NEOPLASM OF OVERLAPPING SITES OF LEFT BREAST IN FEMALE, ESTROGEN RECEPTOR POSITIVE: Primary | ICD-10-CM

## 2024-04-24 DIAGNOSIS — M85.89 OSTEOPENIA OF MULTIPLE SITES: ICD-10-CM

## 2024-04-24 DIAGNOSIS — D51.8 OTHER VITAMIN B12 DEFICIENCY ANEMIA: ICD-10-CM

## 2024-04-24 DIAGNOSIS — C50.812 MALIGNANT NEOPLASM OF OVERLAPPING SITES OF LEFT BREAST IN FEMALE, ESTROGEN RECEPTOR POSITIVE: Primary | ICD-10-CM

## 2024-04-24 DIAGNOSIS — D50.8 OTHER IRON DEFICIENCY ANEMIA: ICD-10-CM

## 2024-04-24 LAB
ALBUMIN SERPL-MCNC: 4.3 G/DL (ref 3.5–5.2)
ALBUMIN/GLOB SERPL: 1.7 G/DL
ALP SERPL-CCNC: 171 U/L (ref 39–117)
ALT SERPL W P-5'-P-CCNC: 13 U/L (ref 1–33)
ANION GAP SERPL CALCULATED.3IONS-SCNC: 12.2 MMOL/L (ref 5–15)
AST SERPL-CCNC: 21 U/L (ref 1–32)
BASOPHILS # BLD AUTO: 0.07 10*3/MM3 (ref 0–0.2)
BASOPHILS NFR BLD AUTO: 0.9 % (ref 0–1.5)
BILIRUB SERPL-MCNC: <0.2 MG/DL (ref 0–1.2)
BUN SERPL-MCNC: 17 MG/DL (ref 6–20)
BUN/CREAT SERPL: 25.8 (ref 7–25)
CALCIUM SPEC-SCNC: 9.3 MG/DL (ref 8.6–10.5)
CHLORIDE SERPL-SCNC: 104 MMOL/L (ref 98–107)
CO2 SERPL-SCNC: 23.8 MMOL/L (ref 22–29)
CREAT SERPL-MCNC: 0.66 MG/DL (ref 0.57–1)
DEPRECATED RDW RBC AUTO: 41.9 FL (ref 37–54)
EGFRCR SERPLBLD CKD-EPI 2021: 107 ML/MIN/1.73
EOSINOPHIL # BLD AUTO: 0.21 10*3/MM3 (ref 0–0.4)
EOSINOPHIL NFR BLD AUTO: 2.6 % (ref 0.3–6.2)
ERYTHROCYTE [DISTWIDTH] IN BLOOD BY AUTOMATED COUNT: 12.6 % (ref 12.3–15.4)
FERRITIN SERPL-MCNC: 92.7 NG/ML (ref 13–150)
GLOBULIN UR ELPH-MCNC: 2.6 GM/DL
GLUCOSE SERPL-MCNC: 83 MG/DL (ref 65–99)
HCT VFR BLD AUTO: 40.1 % (ref 34–46.6)
HGB BLD-MCNC: 13.1 G/DL (ref 12–15.9)
IMM GRANULOCYTES # BLD AUTO: 0.02 10*3/MM3 (ref 0–0.05)
IMM GRANULOCYTES NFR BLD AUTO: 0.2 % (ref 0–0.5)
IRON 24H UR-MRATE: 38 MCG/DL (ref 37–145)
IRON SATN MFR SERPL: 10 % (ref 20–50)
LYMPHOCYTES # BLD AUTO: 2.42 10*3/MM3 (ref 0.7–3.1)
LYMPHOCYTES NFR BLD AUTO: 30 % (ref 19.6–45.3)
MCH RBC QN AUTO: 29.9 PG (ref 26.6–33)
MCHC RBC AUTO-ENTMCNC: 32.7 G/DL (ref 31.5–35.7)
MCV RBC AUTO: 91.6 FL (ref 79–97)
MONOCYTES # BLD AUTO: 0.51 10*3/MM3 (ref 0.1–0.9)
MONOCYTES NFR BLD AUTO: 6.3 % (ref 5–12)
NEUTROPHILS NFR BLD AUTO: 4.85 10*3/MM3 (ref 1.7–7)
NEUTROPHILS NFR BLD AUTO: 60 % (ref 42.7–76)
NRBC BLD AUTO-RTO: 0 /100 WBC (ref 0–0.2)
PLATELET # BLD AUTO: 330 10*3/MM3 (ref 140–450)
PMV BLD AUTO: 10.5 FL (ref 6–12)
POTASSIUM SERPL-SCNC: 4 MMOL/L (ref 3.5–5.2)
PROT SERPL-MCNC: 6.9 G/DL (ref 6–8.5)
RBC # BLD AUTO: 4.38 10*6/MM3 (ref 3.77–5.28)
SODIUM SERPL-SCNC: 140 MMOL/L (ref 136–145)
TIBC SERPL-MCNC: 396 MCG/DL (ref 298–536)
TRANSFERRIN SERPL-MCNC: 266 MG/DL (ref 200–360)
VIT B12 BLD-MCNC: >2000 PG/ML (ref 211–946)
WBC NRBC COR # BLD AUTO: 8.08 10*3/MM3 (ref 3.4–10.8)

## 2024-04-24 PROCEDURE — 80053 COMPREHEN METABOLIC PANEL: CPT

## 2024-04-24 PROCEDURE — 82607 VITAMIN B-12: CPT | Performed by: INTERNAL MEDICINE

## 2024-04-24 PROCEDURE — 99214 OFFICE O/P EST MOD 30 MIN: CPT | Performed by: INTERNAL MEDICINE

## 2024-04-24 PROCEDURE — 82728 ASSAY OF FERRITIN: CPT

## 2024-04-24 PROCEDURE — 84466 ASSAY OF TRANSFERRIN: CPT

## 2024-04-24 PROCEDURE — 85025 COMPLETE CBC W/AUTO DIFF WBC: CPT

## 2024-04-24 PROCEDURE — 36415 COLL VENOUS BLD VENIPUNCTURE: CPT

## 2024-04-24 PROCEDURE — 83540 ASSAY OF IRON: CPT

## 2024-07-24 ENCOUNTER — OFFICE VISIT (OUTPATIENT)
Dept: ENDOCRINOLOGY | Age: 49
End: 2024-07-24
Payer: COMMERCIAL

## 2024-07-24 VITALS
SYSTOLIC BLOOD PRESSURE: 126 MMHG | OXYGEN SATURATION: 96 % | HEIGHT: 65 IN | HEART RATE: 60 BPM | DIASTOLIC BLOOD PRESSURE: 86 MMHG | WEIGHT: 233.4 LBS | BODY MASS INDEX: 38.89 KG/M2

## 2024-07-24 DIAGNOSIS — M85.80 OSTEOPENIA, UNSPECIFIED LOCATION: Primary | ICD-10-CM

## 2024-07-24 PROCEDURE — 99213 OFFICE O/P EST LOW 20 MIN: CPT | Performed by: INTERNAL MEDICINE

## 2024-07-24 NOTE — PROGRESS NOTES
Chief complaint/Reason for consult: osteopenia    HPI:   - 49 year old female here for osteopenia  - Last seen in 3/2023  - No changes since last visit  - Prior fragility fractures: none but she did have a traumatic fracture of her tibia over 20 years  - Prior/current treatments for osteoporosis:  Prolia for 2 years with last injection in 2/2023  - Risk factors for osteoporosis/bone loss:  Breast cancer on Arimidex  - Menopausal status:  10 years ago  - Family history of osteoporosis:  mother  - Vitamin D and calcium intake:  Vitamin D and calcium  - Physical activity:  Minimal due to ankle problems  - Last DXA:  8/2022 showing a most severe T-score of -1.3 in the right femoral neck    The following portions of the patient's history were reviewed and updated as appropriate: allergies, current medications, past family history, past medical history, past social history, past surgical history, and problem list.      Objective     Vitals:    07/24/24 1543   BP: 126/86   Pulse: 60   SpO2: 96%        Physical Exam  Vitals reviewed.   Constitutional:       Appearance: Normal appearance.   HENT:      Head: Normocephalic and atraumatic.   Eyes:      General: No scleral icterus.  Pulmonary:      Effort: Pulmonary effort is normal. No respiratory distress.   Neurological:      Mental Status: She is alert.      Gait: Gait normal.   Psychiatric:         Mood and Affect: Mood normal.         Behavior: Behavior normal.         Thought Content: Thought content normal.         Judgment: Judgment normal.         Assessment & Plan   Osteopenia  - Her FRAX is below the threshold for treatment so would not recommend a repeat dose of Prolia in 8/2023 when she would be do for one  - Recommend 1000 IU vitamin D3 daily, 1200 mg of dietary/supplementary calcium, weight bearing exercise as tolerated  - Ordered DXA for 1/2025    - Return to clinic in 1 year

## 2024-07-26 ENCOUNTER — PATIENT ROUNDING (BHMG ONLY) (OUTPATIENT)
Dept: ENDOCRINOLOGY | Age: 49
End: 2024-07-26
Payer: COMMERCIAL

## 2024-10-22 NOTE — PROGRESS NOTES
"Chief Complaint  Stage IIA ([id number]) left breast cancer ER/DE positive, HER-2/jodi negative.  Recurrent iron deficiency, osteopenia, hepatic steatosis, left upper extremity lymphedema    Subjective        History of Present Illness  Patient returns today in follow-up continuing course of observation/surveillance after completion of 10 years adjuvant endocrine therapy with anastrozole in late September 2023.  Patient has requested to maintain 6-month interval follow-up visits for now.  She does continue on B12 replacement with sublingual B12 in addition to a multivitamin that contains additional B12 and iron.  She tolerates this well.  She is scheduled for her annual mammogram on 10/28/2024.  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  She did see the dermatologist recently last week for some small scattered erythematous areas and forgot to inquire about a questionable lesion around the lateral aspect of her left reconstructed breast.  Patient denies any musculoskeletal pain currently.  She does need a new primary care provider as her provider in [place] has retired..      Objective   Vital Signs:   /84   Pulse 86   Temp 99 °F (37.2 °C) (Oral)   Resp 16   Ht 165.1 cm (65\")   Wt 103 kg (226 lb 11.2 oz)   SpO2 97%   BMI 37.72 kg/m²     Physical Exam  Constitutional:       Appearance: She is well-developed.   HENT:      Left Ear: Tympanic membrane normal.   Eyes:      Conjunctiva/sclera: Conjunctivae normal.   Neck:      Thyroid: No thyromegaly.   Cardiovascular:      Rate and Rhythm: Normal rate and regular rhythm.      Heart sounds: No murmur heard.     No friction rub. No gallop.   Pulmonary:      Effort: No respiratory distress.      Breath sounds: Normal breath sounds.      Comments: Status post right reduction mammoplasty, no masses or abnormalities palpated in the right breast.  Status post left mastectomy with TRAM reconstruction.  There are " some areas of probable fat necrosis palpated in the superior medial aspects of the reconstructed breast that have diminished over time.  No concerning abnormalities identified.  Abdominal:      General: Bowel sounds are normal. There is no distension.      Palpations: Abdomen is soft.      Tenderness: There is no abdominal tenderness.   Lymphadenopathy:      Head:      Right side of head: No submandibular adenopathy.      Cervical: No cervical adenopathy.      Upper Body:      Right upper body: No supraclavicular adenopathy.      Left upper body: No supraclavicular adenopathy.   Skin:     General: Skin is warm and dry.      Findings: No rash.      Comments: There is a small area of concern in the left lateral reconstructed breast with a less than 0.5 cm diameter lesion that is slightly rough and has some irregular pigmentation.  Patient with scattered small erythematous macular areas on the extremities   Neurological:      Mental Status: She is alert and oriented to person, place, and time.      Cranial Nerves: No cranial nerve deficit.      Motor: No abnormal muscle tone.      Deep Tendon Reflexes: Reflexes normal.   Psychiatric:         Behavior: Behavior normal.            Result Review : Reviewed CBC, CMP, iron panel, ferritin, B12 level from today.       Assessment and Plan    *Stage IIA ([id number]) left breast cancer:     Status post left mastectomy with left axillary lymph node dissection 03/12/2013.  Lesion #1 invasive ductal carcinoma grade 2, 1.5 cm ER positive (90%), MN positive (1-4%), HER-2/jodi FISH negative (ratio 1.4),  Ki-67 score of 8%.  Lesion #2 invasive ductal carcinoma, grade 2, 1.7 cm,  ER positive (70%), MN negative (%),HER-2/jodi FISH positive (ratio of 3.0).  3 of 15 lymph nodes positive (2 macrometastases, 1 micrometastasis, largest lesion 2.5 cm replacing lymph node).  Lymph node evaluated and was ER positive (90%), MN positive (30%), HER-2/jodi FISH negative (ratio of 1.4).   She received  adjuvant dose-dense chemotherapy with AC x4 cycles followed by weekly Taxol x12 doses completed on 08/27/2013. She did receive Herceptin in conjunction with the Taxol and then completed a total of a year with last dose of Herceptin received on 05/21/2014 (3-week dose).   She did initiate adjuvant tamoxifen on 09/25/2013 and we anticipate likely a 10-year course of treatment.   BRCA-1 and 2 testing negative at time of diagnosis.  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  Due to continued menorrhagia she underwent KATE/BSO on 6/5/18 with benign findings.    DEXA scan 10/12/18 with evidence of osteopenia with maximal T score -1.4 the lumbar spine.    Since the patient was rendered surgically postmenopausal with only mild osteopenia, she was transitioned from tamoxifen to adjuvant Arimidex on 11/21/18 with intent to complete 10 years of total adjuvant endocrine therapy (from time of initiation of tamoxifen 9/25/13).  The patient was able to undergo reconstructive therapy in late 2020.  Dr. Montgomery performed a left TRAM reconstruction on 8/4/2020, pathology from specimen was negative for malignancy.  She had difficulty with necrosis and underwent revision procedure on 8/24/2020, again with pathology negative for malignancy but showing evidence of inflammation and fat necrosis.  She underwent subsequent right reduction mammoplasty for symmetry on 11/24/2020.  Pathology from the right breast was negative for malignancy.  Additional plastic surgery procedure 2/25/2021 to excise excess tissue left lateral chest wall/axilla  Patient discontinued adjuvant Arimidex in late September 2023 after completion of 10 years adjuvant endocrine therapy.  Patient returns today for a 6-month interval follow-up visit.  She has remained off of adjuvant Arimidex since late September when she completed 10 years of treatment.  The patient continues to do well with no clinical evidence of recurrent disease.  Her exam today is  negative.  She is scheduled for her annual mammogram on 10/28/2024.  We did discuss potential transition to annual follow-up however she would like to maintain 6-month visits for now.    *Depression/anxiety with prior psychotic episode:   The patient required hospitalization in the behavioral health unit in October 2015.   She has had difficulties with anxiety and depression in the past, however, given her breast cancer diagnosis as well as stressors from her job and family, it appears that these precipitated the event.    Patient continues on Lexapro at 20 mg per day.     *Rheumatoid arthritis:   The patient was experiencing diffuse arthralgias as well as pain in her wrists right greater than left.    We did perform an evaluation 4/28/17 with negative MARYSE panel however rheumatoid factor was positive at 16.4.    She was referred to rheumatology Dr. Lopez in May 2017.   The patient was felt to have early autoimmune disorder/rheumatoid arthritis.    She was started on Plaquenil.    Symptoms have stabilized and improved over time.    Patient discontinued Plaquenil on her own sometime in 2021  Patient has not experienced any recurrence of her arthralgias remains off of Plaquenil.  She has not followed up with rheumatology in quite some time.    *Vitamin D deficiency:   The patient has been on and off of vitamin D supplementation in the past.    Borderline low 25 hydroxy vitamin D level on 5/14/2019 at 28.3 and was taking vitamin D 400 units over-the-counter daily, subsequently increased dose to 800 units daily.    On 8/6/2019, 25 hydroxy vitamin D level had improved to 34.8.    *Anemia with history of iron deficiency and B12 deficiency in the setting of gastric bypass:   The patient developed significant anemia with a hemoglobin down to 10.8 on 3/30/16.    Evaluation revealed iron deficiency with a ferritin of 12.5 iron percent saturation of 5 and a TIBC 535.    She started oral iron sulfate 325 mg twice a day and was  tolerating this very well.  Her hemoglobin improved into the 12 range.    Her iron studies showed a significant improvement previously in October 2016 with ferritin up to 54, iron saturation 16%.    She was asked to continue on oral iron twice a day however she did not do so.    The patient also was found to have B12 deficiency with a level of 187 on 3/30/16.  She started B12 replacement and received weekly dosing ×4 followed bimonthly injections.  As it was difficult for her to come in to the office for B12 injections, she was asked to begin oral B12 1000 µg daily however she did not do this.    On 4/28/17, she was mildly anemic with a hemoglobin of 11.4.  She had a thrombocytosis as well, related to iron deficiency.  Repeat iron studies 4/28/17 showed a ferritin that declined to 42, iron saturation low at 7%, elevated TIBC at 491. She was asked to resume oral iron twice daily.  B12 level 4/28/17 had remained in the low normal range at 370 and was monitored.    On 7/14/17, B12 level was again low normal at 299 if she was asked to resume oral B12 however she did not do so.  She was intolerant of oral iron with diarrhea and abdominal cramping.    Labs on 11/8/17 showed a declining iron saturation 9% and a slight decline in ferritin down to 39 with a TIBC at the 501.  Her hemoglobin was up to 12.8.  Oral iron was discontinued 11/8/17 and she proceeded on with Feraheme on 11/15/17 and 11/22/17.  She also resumed oral B12 daily.    The patient had recurrent menorrhagia but underwent KATE/BSO on 6/5/18.    B12 level on 5/14/2019 was 1053.    On 1/17/2020, hemoglobin declined slightly to 11.7.  We did repeat her iron studies showing ferritin of 116, iron 27, iron saturation 8%, TIBC 325.  Her anemia may be related to recent gastric bypass procedure.   On 1/8/2021, hemoglobin was 12.9 with iron 51, ferritin 37.8, iron saturation 11%, TIBC 469, B12 799.  There had been gradual decline in her ferritin level and with  development of low iron saturation.  She has been intolerant of oral iron and would likely not absorb the iron at this point due to her gastric bypass.  Therefore received Injectafer 750 mg IV on 1/15 and 1/22/2021.  Patient does continue on multivitamin that contains small amounts of iron and B12  Patient initiated B12 1000 mcg sublingual on 9/6/2023  Labs today with hemoglobin that remains normal at 12.9 with iron studies that are fairly stable with iron 36, ferritin 101, iron saturation 9%, TIBC 390.  She has undergone previous gastric bypass and we will need to continue monitoring her iron status with potential she may require further IV iron in the future if studies declined significantly.  She does continue on a multivitamin daily that does contain some iron.  She does continue on B12 supplementation 1000 mcg sublingual daily B12 level today that remains greater than 2000    *Elevated transaminases with evidence of hepatic steatosis:   The patient has had intermittent mild elevation in transaminases.    Viral hepatitis panel -5/14/2019.    Hepatic ultrasound 5/28/2019 with borderline hepatomegaly with probable mild fatty infiltration with increased echotexture.    Patient with normal transaminases.  She has had an intermittent borderline elevated alkaline phosphatase however this has been more significantly elevated recently up to 171 on 4/20/2024 and 156 today.  We will add alkaline phosphatase isoenzymes.  If there is a predominant tissue origin consider whether we should pursue any imaging studies and we will notify her if this is the case.    *Left upper extremity lymphedema:   The patient does wear her sleeve intermittently, is not wearing it today.    Lymphedema is stable to improved.    *Osteopenia:   Previous DEXA scan 3/5/14 osteopenia with maximal T score of -1.1 in the lumbar spine.    Surgically postmenopausal.    Repeat DEXA scan in 10/12/18 with maximal T score -1.4 lumbar spine showing worsening  since 2014.    Aromatase inhibitor therapy initiated with Arimidex 1 mg daily on 11/20/18.    One year follow-up DEXA scan 10/18/2019 showed a 5% improvement in lumbar spine with T score 0.1, 23% decrease right femoral neck with T score -2.1, 18% improvement in left femoral neck with T score -0.3.  With decline in bone density in the right femoral neck approaching osteoporosis, recommended that we pursue treatment at this point as she continues on aromatase inhibitor therapy.  She is not a good candidate for oral Fosamax given her difficulties with gastritis and ulcer as well as gastric bypass.  Therefore initiated Prolia on 10/31/2019.    1 year interval follow-up DEXA scan on 12/31/2020 showed evidence of osteopenia with T score lumbar spine -0.5, left hip -0.6, and right hip -1.5.  The maximal T score in the right hip improved significantly compared to value from 2019 at which time it was -2.1.  There was a slight decline in normal bone density in the lumbar spine and left hip.  DEXA scan 1/16/2023 with stable findings, evidence of osteopenia with T score lumbar spine 0.6, left hip -0.9, right hip -1.3.  Patient referred to endocrinology, was seen on 3/10/2023.  Due to improvement in the patient's bone density was recommended for her to discontinue further Prolia  Patient continues follow-up with endocrinology, last seen on 7/25/2024 with plans for repeat DEXA scan in January 2025    *Hypercalcemia secondary to hyperparathyroidism:   The patient had hypercalcemia on 5/14/2019 with calcium 10.8, albumin 4.6.  Intact PTH was suppressed at 12.8.  Suspected to be related to exogenous calcium supplementation.   On 8/10/2022, calcium was 10.4, intact PTH was elevated at 91.6 consistent with hyperparathyroidism as well as exogenous calcium supplementation  Calcium today normal at 9.0    *Obesity:   The patient is status post gastric bypass at [facility] in [place] on 11/13/2019.    The patient lost significant  weight down from 296 down to plateau around 200 pounds.  Weight did increase over time however plateaued and now has declined slightly at 226 pounds    *Reported gastritis/gastric ulcer:   EGD performed prior to gastric bypass showed evidence of ulcer and gastritis.    She was treated with Carafate and omeprazole with follow-up EGD on 10/14/2019 that showed improvement but did also show a hiatal hernia.     *Right knee osteoarthritis  Patient with aggravation of her right knee osteoarthritis, was seen by orthopedics on 7/14/2023 and underwent steroid injection with improvement in symptoms.    *Skin rash and left breast skin lesion  Patient was just in to see dermatology last week, has scattered erythematous macular lesions on her extremities.  She was unsure of the diagnosis and we will obtain records.  The areas are nonpruritic.  She also noted an abnormality in the lateral aspect of her reconstructed left breast.  There is a very small less than 0.5 cm diameter lesion that is somewhat rough in texture and appears to have irregular pigmentation versus a small scabbed lesion from skin irritation.  The patient will have her dermatologist evaluate this further.     PLAN:     Patient continues on a course of observation/surveillance in regards to her history of left breast cancer with no clinical evidence of recurrent disease.  She completed 10 years adjuvant endocrine therapy with anastrozole in late September 2023.  Additional labs today with alkaline phosphatase isoenzymes.  Pending results, if any additional evaluation is warranted I will notify her  Continue multivitamin daily which contains 500 mcg B12 as well as minimal amount of iron  Continue OTC sublingual B12 supplement 1000 mcg daily  Patient continues follow-up with endocrinology regarding osteopenia, plans for DEXA scan in January 2025  Patient will follow-up with dermatology regarding the cutaneous abnormality in the lateral left reconstructed breast  We  will obtain records from recent dermatology visit last week.  Patient's PCP has retired and we will refer her at her request to a new PCP in the [facility] system  Patient is scheduled for annual mammogram on 10/28/2024  MD visit in 6 months with CBC, CMP, B12 level, stat iron panel/ferritin

## 2024-10-23 ENCOUNTER — LAB (OUTPATIENT)
Dept: LAB | Facility: HOSPITAL | Age: 49
End: 2024-10-23
Payer: COMMERCIAL

## 2024-10-23 ENCOUNTER — OFFICE VISIT (OUTPATIENT)
Dept: ONCOLOGY | Facility: CLINIC | Age: 49
End: 2024-10-23
Payer: COMMERCIAL

## 2024-10-23 VITALS
DIASTOLIC BLOOD PRESSURE: 84 MMHG | BODY MASS INDEX: 37.77 KG/M2 | OXYGEN SATURATION: 97 % | WEIGHT: 226.7 LBS | HEIGHT: 65 IN | RESPIRATION RATE: 16 BRPM | SYSTOLIC BLOOD PRESSURE: 129 MMHG | HEART RATE: 86 BPM | TEMPERATURE: 99 F

## 2024-10-23 DIAGNOSIS — Z76.89 ENCOUNTER TO ESTABLISH CARE: ICD-10-CM

## 2024-10-23 DIAGNOSIS — C50.812 MALIGNANT NEOPLASM OF OVERLAPPING SITES OF LEFT BREAST IN FEMALE, ESTROGEN RECEPTOR POSITIVE: ICD-10-CM

## 2024-10-23 DIAGNOSIS — Z17.0 MALIGNANT NEOPLASM OF OVERLAPPING SITES OF LEFT BREAST IN FEMALE, ESTROGEN RECEPTOR POSITIVE: Primary | ICD-10-CM

## 2024-10-23 DIAGNOSIS — C50.812 MALIGNANT NEOPLASM OF OVERLAPPING SITES OF LEFT BREAST IN FEMALE, ESTROGEN RECEPTOR POSITIVE: Primary | ICD-10-CM

## 2024-10-23 DIAGNOSIS — Z17.0 MALIGNANT NEOPLASM OF OVERLAPPING SITES OF LEFT BREAST IN FEMALE, ESTROGEN RECEPTOR POSITIVE: ICD-10-CM

## 2024-10-23 DIAGNOSIS — M85.89 OSTEOPENIA OF MULTIPLE SITES: ICD-10-CM

## 2024-10-23 DIAGNOSIS — D50.9 IRON DEFICIENCY ANEMIA, UNSPECIFIED IRON DEFICIENCY ANEMIA TYPE: ICD-10-CM

## 2024-10-23 LAB
ALBUMIN SERPL-MCNC: 4.2 G/DL (ref 3.5–5.2)
ALBUMIN/GLOB SERPL: 1.4 G/DL
ALP SERPL-CCNC: 156 U/L (ref 39–117)
ALT SERPL W P-5'-P-CCNC: 27 U/L (ref 1–33)
ANION GAP SERPL CALCULATED.3IONS-SCNC: 13.8 MMOL/L (ref 5–15)
AST SERPL-CCNC: 23 U/L (ref 1–32)
BASOPHILS # BLD AUTO: 0.06 10*3/MM3 (ref 0–0.2)
BASOPHILS NFR BLD AUTO: 0.6 % (ref 0–1.5)
BILIRUB SERPL-MCNC: <0.2 MG/DL (ref 0–1.2)
BUN SERPL-MCNC: 16 MG/DL (ref 6–20)
BUN/CREAT SERPL: 25 (ref 7–25)
CALCIUM SPEC-SCNC: 9 MG/DL (ref 8.6–10.5)
CHLORIDE SERPL-SCNC: 101 MMOL/L (ref 98–107)
CO2 SERPL-SCNC: 24.2 MMOL/L (ref 22–29)
CREAT SERPL-MCNC: 0.64 MG/DL (ref 0.57–1)
DEPRECATED RDW RBC AUTO: 43.8 FL (ref 37–54)
EGFRCR SERPLBLD CKD-EPI 2021: 107.1 ML/MIN/1.73
EOSINOPHIL # BLD AUTO: 0.19 10*3/MM3 (ref 0–0.4)
EOSINOPHIL NFR BLD AUTO: 2 % (ref 0.3–6.2)
ERYTHROCYTE [DISTWIDTH] IN BLOOD BY AUTOMATED COUNT: 13 % (ref 12.3–15.4)
FERRITIN SERPL-MCNC: 101 NG/ML (ref 13–150)
GLOBULIN UR ELPH-MCNC: 2.9 GM/DL
GLUCOSE SERPL-MCNC: 209 MG/DL (ref 65–99)
HCT VFR BLD AUTO: 40.9 % (ref 34–46.6)
HGB BLD-MCNC: 12.9 G/DL (ref 12–15.9)
IMM GRANULOCYTES # BLD AUTO: 0.03 10*3/MM3 (ref 0–0.05)
IMM GRANULOCYTES NFR BLD AUTO: 0.3 % (ref 0–0.5)
IRON 24H UR-MRATE: 36 MCG/DL (ref 37–145)
IRON SATN MFR SERPL: 9 % (ref 20–50)
LYMPHOCYTES # BLD AUTO: 2.89 10*3/MM3 (ref 0.7–3.1)
LYMPHOCYTES NFR BLD AUTO: 30.6 % (ref 19.6–45.3)
MCH RBC QN AUTO: 29.1 PG (ref 26.6–33)
MCHC RBC AUTO-ENTMCNC: 31.5 G/DL (ref 31.5–35.7)
MCV RBC AUTO: 92.3 FL (ref 79–97)
MONOCYTES # BLD AUTO: 0.37 10*3/MM3 (ref 0.1–0.9)
MONOCYTES NFR BLD AUTO: 3.9 % (ref 5–12)
NEUTROPHILS NFR BLD AUTO: 5.89 10*3/MM3 (ref 1.7–7)
NEUTROPHILS NFR BLD AUTO: 62.6 % (ref 42.7–76)
NRBC BLD AUTO-RTO: 0 /100 WBC (ref 0–0.2)
PLATELET # BLD AUTO: 355 10*3/MM3 (ref 140–450)
PMV BLD AUTO: 10.3 FL (ref 6–12)
POTASSIUM SERPL-SCNC: 3.7 MMOL/L (ref 3.5–5.2)
PROT SERPL-MCNC: 7.1 G/DL (ref 6–8.5)
RBC # BLD AUTO: 4.43 10*6/MM3 (ref 3.77–5.28)
SODIUM SERPL-SCNC: 139 MMOL/L (ref 136–145)
TIBC SERPL-MCNC: 390 MCG/DL (ref 298–536)
TRANSFERRIN SERPL-MCNC: 262 MG/DL (ref 200–360)
VIT B12 BLD-MCNC: >2000 PG/ML (ref 211–946)
WBC NRBC COR # BLD AUTO: 9.43 10*3/MM3 (ref 3.4–10.8)

## 2024-10-23 PROCEDURE — 36415 COLL VENOUS BLD VENIPUNCTURE: CPT

## 2024-10-23 PROCEDURE — 85025 COMPLETE CBC W/AUTO DIFF WBC: CPT

## 2024-10-23 PROCEDURE — 84466 ASSAY OF TRANSFERRIN: CPT

## 2024-10-23 PROCEDURE — 80053 COMPREHEN METABOLIC PANEL: CPT

## 2024-10-23 PROCEDURE — 83540 ASSAY OF IRON: CPT

## 2024-10-23 PROCEDURE — 82607 VITAMIN B-12: CPT | Performed by: INTERNAL MEDICINE

## 2024-10-23 PROCEDURE — 99214 OFFICE O/P EST MOD 30 MIN: CPT | Performed by: INTERNAL MEDICINE

## 2024-10-23 PROCEDURE — 82728 ASSAY OF FERRITIN: CPT

## 2024-10-25 LAB
ALP BONE CFR SERPL: 50 % (ref 14–68)
ALP INTEST CFR SERPL: 0 % (ref 0–18)
ALP LIVER CFR SERPL: 50 % (ref 18–85)
ALP SERPL-CCNC: 164 IU/L (ref 44–121)

## 2024-10-28 ENCOUNTER — APPOINTMENT (OUTPATIENT)
Dept: WOMENS IMAGING | Facility: HOSPITAL | Age: 49
End: 2024-10-28
Payer: COMMERCIAL

## 2024-10-28 PROCEDURE — 77067 SCR MAMMO BI INCL CAD: CPT | Performed by: RADIOLOGY

## 2024-10-28 PROCEDURE — 77063 BREAST TOMOSYNTHESIS BI: CPT | Performed by: RADIOLOGY

## 2024-10-29 ENCOUNTER — TELEPHONE (OUTPATIENT)
Dept: PEDIATRICS | Facility: OTHER | Age: 49
End: 2024-10-29
Payer: COMMERCIAL

## 2024-10-29 NOTE — TELEPHONE ENCOUNTER
Caller: Yu Thakur    Relationship: Self    Best call back number: [phone]     Who are you requesting to speak with (clinical staff, provider,  specific staff member): scheduling for Dr Lee Ann Alonso    What was the call regarding: Patient has been referred to Dr. Lee Ann Alonso by Dr Glenn Lyon in Oncology. Advised patient that  is not currently accepting new patients. She has asked that a note be sent to Dr Alonso' office to ask if she would be willing to accept her as a new patient being that she is an oncology patient and has a referral. She is requesting a call to confirm whether or not she can establish care with Dr Alonso. Please advise.

## 2025-01-17 ENCOUNTER — HOSPITAL ENCOUNTER (OUTPATIENT)
Dept: BONE DENSITY | Facility: HOSPITAL | Age: 50
Discharge: HOME | End: 2025-01-17
Admitting: INTERNAL MEDICINE
Payer: COMMERCIAL

## 2025-01-17 DIAGNOSIS — M85.80 OSTEOPENIA, UNSPECIFIED LOCATION: ICD-10-CM

## 2025-01-17 PROCEDURE — 77080 DXA BONE DENSITY AXIAL: CPT

## 2025-01-20 ENCOUNTER — OFFICE VISIT (OUTPATIENT)
Dept: INTERNAL MEDICINE | Facility: CLINIC | Age: 50
End: 2025-01-20
Payer: COMMERCIAL

## 2025-01-20 VITALS
DIASTOLIC BLOOD PRESSURE: 76 MMHG | HEART RATE: 86 BPM | WEIGHT: 210 LBS | SYSTOLIC BLOOD PRESSURE: 128 MMHG | BODY MASS INDEX: 34.99 KG/M2 | HEIGHT: 65 IN

## 2025-01-20 DIAGNOSIS — F32.89 OTHER DEPRESSION: Chronic | ICD-10-CM

## 2025-01-20 DIAGNOSIS — C50.812 MALIGNANT NEOPLASM OF OVERLAPPING SITES OF LEFT BREAST IN FEMALE, ESTROGEN RECEPTOR POSITIVE: ICD-10-CM

## 2025-01-20 DIAGNOSIS — R03.0 ELEVATED BP WITHOUT DIAGNOSIS OF HYPERTENSION: ICD-10-CM

## 2025-01-20 DIAGNOSIS — E55.9 VITAMIN D DEFICIENCY: Primary | ICD-10-CM

## 2025-01-20 DIAGNOSIS — N30.01 ACUTE CYSTITIS WITH HEMATURIA: ICD-10-CM

## 2025-01-20 DIAGNOSIS — Z17.0 MALIGNANT NEOPLASM OF OVERLAPPING SITES OF LEFT BREAST IN FEMALE, ESTROGEN RECEPTOR POSITIVE: ICD-10-CM

## 2025-01-20 DIAGNOSIS — Z00.00 PHYSICAL EXAM, ANNUAL: ICD-10-CM

## 2025-01-20 PROBLEM — Z85.3 HISTORY OF BREAST CANCER: Status: RESOLVED | Noted: 2020-08-04 | Resolved: 2025-01-20

## 2025-01-20 PROBLEM — R52 PAIN: Status: RESOLVED | Noted: 2023-03-02 | Resolved: 2025-01-20

## 2025-01-20 PROBLEM — R74.8 ABNORMAL TRANSAMINASES: Status: RESOLVED | Noted: 2019-05-14 | Resolved: 2025-01-20

## 2025-01-20 LAB
BILIRUB BLD-MCNC: NEGATIVE MG/DL
CLARITY, POC: CLEAR
COLOR UR: YELLOW
EXPIRATION DATE: ABNORMAL
GLUCOSE UR STRIP-MCNC: NEGATIVE MG/DL
KETONES UR QL: NEGATIVE
LEUKOCYTE EST, POC: ABNORMAL
Lab: ABNORMAL
NITRITE UR-MCNC: NEGATIVE MG/ML
PH UR: 6 [PH] (ref 5–8)
PROT UR STRIP-MCNC: NEGATIVE MG/DL
RBC # UR STRIP: ABNORMAL /UL
SP GR UR: 1.02 (ref 1–1.03)
UROBILINOGEN UR QL: NORMAL

## 2025-01-20 PROCEDURE — 81003 URINALYSIS AUTO W/O SCOPE: CPT | Performed by: INTERNAL MEDICINE

## 2025-01-20 PROCEDURE — 99204 OFFICE O/P NEW MOD 45 MIN: CPT | Performed by: INTERNAL MEDICINE

## 2025-01-20 NOTE — PROGRESS NOTES
"Chief Complaint  Urinary Tract Infection    Subjective        Yu Thakur presents to [facility]  History of Present Illness  Here to establish- referred by Dr. Lyon.   Was seen by gyn in December- told she had a UTI- looked back and told she had one most years she goes in - very little sx- hysterectomy/BSO heavy menses- 2018- she had already had breast cancer in 2013- tx chemo and XRT.  Actually had 2 different breast cancers.   Had gastric bypass in 2019- weight was over 300- Dr. Morales at [facility]. Had a tram flap afterward.   Took Prolia for 3-4 years as a result of the anastrazole- endocrinology has held due to improvement off the anastrozole.   Teaches middle school language arts.   Does [employer]- [employer]. Walks for exercise.  Has been seeing Dr. Camp after a depression after her cancer, etc. Takes wellwith Lexapro and lorazepam- just a night mainly.   Was on BP and medications for diabetes - has been able to stop these since gastric bypass.     Objective   Vital Signs:  /76   Pulse 86   Ht 164.5 cm (64.75\")   Wt 95.3 kg (210 lb)   BMI 35.22 kg/m²   Estimated body mass index is 35.22 kg/m² as calculated from the following:    Height as of this encounter: 164.5 cm (64.75\").    Weight as of this encounter: 95.3 kg (210 lb).    Class 2 Severe Obesity (BMI >=35 and <=39.9). Obesity-related health conditions include the following: none. Obesity is improving with treatment. BMI is is above average; BMI management plan is completed. We discussed portion control and increasing exercise.      Physical Exam  Constitutional:       Appearance: Normal appearance.   Cardiovascular:      Rate and Rhythm: Normal rate.   Pulmonary:      Effort: Pulmonary effort is normal.   Musculoskeletal:      Right lower leg: No edema.      Left lower leg: No edema.        Result Review :  The following data was reviewed by: Lee Ann Alonso MD on 01/20/2025:    Data reviewed : " previous chart- consultants notes.            Assessment and Plan   Diagnoses and all orders for this visit:    1. Vitamin D deficiency (Primary)  -     Vitamin D,25-Hydroxy; Future    2. Physical exam, annual  Comments:  for f/u appt  Orders:  -     Comprehensive Metabolic Panel; Future  -     Lipid Panel With / Chol / HDL Ratio; Future  -     TSH; Future    3. Acute cystitis with hematuria  Comments:  check urine culture- hold urology referral for now  Orders:  -     POCT urinalysis dipstick, automated  -     Urine Culture - Urine, Urine, Clean Catch    4. Other depression  Comments:  feels well controlled with Dr. Hidalgo    5. Malignant neoplasm of overlapping sites of left breast in female, estrogen receptor positive  Comments:  follows with Dr. foley- understand off anastrazole, also off Prolia now- discussed exercise to  help maintain bones.    6. Elevated BP without diagnosis of hypertension  Comments:  much improved with wt loss- cont to monitor           I spent 30 minutes caring for Yu on this date of service. This time includes time spent by me in the following activities:preparing for the visit, reviewing tests, and counseling and educating the patient/family/caregiver  Follow Up   Return in about 3 months (around 4/20/2025) for Annual physical, Lab Before FUP.  Patient was given instructions and counseling regarding her condition or for health maintenance advice. Please see specific information pulled into the AVS if appropriate.

## 2025-01-24 DIAGNOSIS — N30.01 ACUTE CYSTITIS WITH HEMATURIA: Primary | ICD-10-CM

## 2025-01-24 LAB
BACTERIA UR CULT: ABNORMAL
OTHER ANTIBIOTIC SUSC ISLT: ABNORMAL

## 2025-01-24 RX ORDER — NITROFURANTOIN 25; 75 MG/1; MG/1
100 CAPSULE ORAL 2 TIMES DAILY
Qty: 10 CAPSULE | Refills: 0 | Status: SHIPPED | OUTPATIENT
Start: 2025-01-24 | End: 2025-01-29

## 2025-01-29 ENCOUNTER — TELEPHONE (OUTPATIENT)
Dept: INTERNAL MEDICINE | Facility: CLINIC | Age: 50
End: 2025-01-29
Payer: COMMERCIAL

## 2025-01-30 NOTE — TELEPHONE ENCOUNTER
Name: Yu Thakur      Relationship: Self          HUB PROVIDED THE RELAY MESSAGE FROM THE OFFICE      PATIENT: VOICED UNDERSTANDING AND HAS NO FURTHER QUESTIONS AT THIS TIME

## 2025-02-10 ENCOUNTER — TELEPHONE (OUTPATIENT)
Dept: INTERNAL MEDICINE | Facility: CLINIC | Age: 50
End: 2025-02-10
Payer: COMMERCIAL

## 2025-02-10 NOTE — TELEPHONE ENCOUNTER
She could have a kidney stone with the back pain and blood- can see her tomorrow at 11:30 or go to [facility] tonight.

## 2025-02-10 NOTE — TELEPHONE ENCOUNTER
Provider: DR TYLER    Caller: Yu Thakur    Relationship to Patient: Self    Pharmacy:        [facility] [id number] - [facility] [place] - [address] [facility] - [phone] [facility] [phone]  [phone]       Phone Number:     [phone]       Reason for Call: PATIENT IS CALLING TO STATE SHE WAS PRESCRIBED AN ANTIBIOTIC WHEN SHE SAW DR TYLER LAST WEEK.  SHE STATES SHE HAS FINISHED THE ANTIBIOTICS AND FEELS WORSE THAN SHE DID LAST WEEK.  SHE STATES SHE IS HAVING SOME BACK PAIN AN BLOOD IN HER URINE.  SHE IS WANTING TO KNOW IF DR TYLER WOULD RECOMMEND THAT SHE TAKE ANOTHER ROUND OF ANTIBIOTICS OR WHAT SHE RECOMMENDS.    PLEASE ADVISE.

## 2025-04-02 ENCOUNTER — OFFICE VISIT (OUTPATIENT)
Dept: INTERNAL MEDICINE | Facility: CLINIC | Age: 50
End: 2025-04-02
Payer: COMMERCIAL

## 2025-04-02 VITALS
WEIGHT: 210 LBS | HEART RATE: 68 BPM | SYSTOLIC BLOOD PRESSURE: 124 MMHG | HEIGHT: 64 IN | DIASTOLIC BLOOD PRESSURE: 78 MMHG | BODY MASS INDEX: 35.85 KG/M2

## 2025-04-02 DIAGNOSIS — N30.00 ACUTE CYSTITIS WITHOUT HEMATURIA: ICD-10-CM

## 2025-04-02 DIAGNOSIS — F32.89 OTHER DEPRESSION: Chronic | ICD-10-CM

## 2025-04-02 DIAGNOSIS — Z00.00 PHYSICAL EXAM, ANNUAL: Primary | ICD-10-CM

## 2025-04-02 DIAGNOSIS — E55.9 VITAMIN D DEFICIENCY: Chronic | ICD-10-CM

## 2025-04-02 DIAGNOSIS — R03.0 ELEVATED BP WITHOUT DIAGNOSIS OF HYPERTENSION: Chronic | ICD-10-CM

## 2025-04-02 PROBLEM — M25.50 ARTHRALGIA OF MULTIPLE SITES: Status: RESOLVED | Noted: 2017-04-28 | Resolved: 2025-04-02

## 2025-04-02 PROBLEM — M25.871: Status: RESOLVED | Noted: 2023-02-09 | Resolved: 2025-04-02

## 2025-04-02 PROBLEM — M19.079 ARTHRITIS OF FOOT: Status: RESOLVED | Noted: 2023-02-09 | Resolved: 2025-04-02

## 2025-04-02 LAB
BILIRUB BLD-MCNC: NEGATIVE MG/DL
CLARITY, POC: CLEAR
COLOR UR: YELLOW
EXPIRATION DATE: ABNORMAL
GLUCOSE UR STRIP-MCNC: NEGATIVE MG/DL
KETONES UR QL: NEGATIVE
LEUKOCYTE EST, POC: ABNORMAL
Lab: ABNORMAL
NITRITE UR-MCNC: NEGATIVE MG/ML
PH UR: 6 [PH] (ref 5–8)
PROT UR STRIP-MCNC: NEGATIVE MG/DL
RBC # UR STRIP: NEGATIVE /UL
SP GR UR: 1.01 (ref 1–1.03)
UROBILINOGEN UR QL: NORMAL

## 2025-04-02 PROCEDURE — 99396 PREV VISIT EST AGE 40-64: CPT | Performed by: INTERNAL MEDICINE

## 2025-04-02 PROCEDURE — 81003 URINALYSIS AUTO W/O SCOPE: CPT | Performed by: INTERNAL MEDICINE

## 2025-04-02 NOTE — PROGRESS NOTES
"Chief Complaint  CPE    Subjective        Yu Thakur presents to [facility]  History of Present Illness here for reg f/u- she is a member of [employer] which helps her exercise- lots of walking dogs, walking with teaching.   She has no complaints- sees Dr. Hidalgo regularly- meds unchanged.     Objective   Vital Signs:  /78   Pulse 68   Ht 161.9 cm (63.75\")   Wt 95.3 kg (210 lb)   BMI 36.33 kg/m²   Estimated body mass index is 36.33 kg/m² as calculated from the following:    Height as of this encounter: 161.9 cm (63.75\").    Weight as of this encounter: 95.3 kg (210 lb).            Physical Exam  Constitutional:       General: She is not in acute distress.     Appearance: She is obese.   Eyes:      Conjunctiva/sclera: Conjunctivae normal.   Neck:      Thyroid: No thyromegaly.   Cardiovascular:      Rate and Rhythm: Normal rate and regular rhythm.      Heart sounds: Normal heart sounds.   Pulmonary:      Effort: Pulmonary effort is normal.      Breath sounds: Normal breath sounds.   Abdominal:      General: Bowel sounds are normal.      Palpations: Abdomen is soft.   Musculoskeletal:      Right lower leg: No edema.      Left lower leg: No edema.   Lymphadenopathy:      Cervical: No cervical adenopathy.   Skin:     General: Skin is warm and dry.   Neurological:      Mental Status: She is alert and oriented to person, place, and time.   Psychiatric:         Behavior: Behavior normal.         Thought Content: Thought content normal.         Judgment: Judgment normal.        Result Review :  The following data was reviewed by: Lee Ann Alonso MD on 04/02/2025:  CMP          4/24/2024    16:32 10/23/2024    16:16 3/31/2025    10:45   CMP   Glucose 83  209  101    BUN 17  16  12    Creatinine 0.66  0.64  0.68    EGFR 107.0  107.1  105    Sodium 140  139  141    Potassium 4.0  3.7  4.0    Chloride 104  101  107    Calcium 9.3  9.0  9.7    Total Protein 6.9  7.1  7.3  "   Albumin 4.3  4.2  4.5    Globulin 2.6  2.9  2.8    Total Bilirubin <0.2  <0.2  0.3    Alkaline Phosphatase 171  156     164  167    AST (SGOT) 21  23  18    ALT (SGPT) 13  27  17    Albumin/Globulin Ratio 1.7  1.4     BUN/Creatinine Ratio 25.8  25.0  18    Anion Gap 12.2  13.8       CBC          4/24/2024    16:32 10/23/2024    16:16   CBC   WBC 8.08  9.43    RBC 4.38  4.43    Hemoglobin 13.1  12.9    Hematocrit 40.1  40.9    MCV 91.6  92.3    MCH 29.9  29.1    MCHC 32.7  31.5    RDW 12.6  13.0    Platelets 330  355      Lipid Panel          3/31/2025    10:45   Lipid Panel   Total Cholesterol 168    Triglycerides 114    HDL Cholesterol 47    VLDL Cholesterol 21    LDL Cholesterol  100      TSH          3/31/2025    10:45   TSH   TSH 1.700      Vitamin D 24              Assessment and Plan   Diagnoses and all orders for this visit:    1. Physical exam, annual (Primary)  Comments:  wt and exercise regimen addressed- goal to get < 200 lbs. Vaccines reviewed- to get Shingrix on a weekend. Other labs remain favorable.    2. Vitamin D deficiency  Comments:  resume daily supplement [id number] IU per day    3. Other depression  Comments:  stable    4. Elevated BP without diagnosis of hypertension  Comments:  new today- but better on later check- would recommend checking on occ at home.             Follow Up   Return in about 1 year (around 4/2/2026) for Annual physical, Lab Before FUP.  Patient was given instructions and counseling regarding her condition or for health maintenance advice. Please see specific information pulled into the AVS if appropriate.

## 2025-04-05 LAB
BACTERIA UR CULT: ABNORMAL
BACTERIA UR CULT: ABNORMAL
OTHER ANTIBIOTIC SUSC ISLT: ABNORMAL

## 2025-05-20 NOTE — PROGRESS NOTES
"Chief Complaint  Stage IIA ([id number]) left breast cancer ER/VT positive, HER-2/jodi negative.  Recurrent iron deficiency, osteopenia, hepatic steatosis, left upper extremity lymphedema    Subjective        History of Present Illness  Patient returns today in follow-up continuing course of observation/surveillance after completion of 10 years adjuvant endocrine therapy with anastrozole in late September 2023.  Patient has requested to maintain 6-month interval follow-up visits for now.  Patient reports that she has done quite well since her last visit here.  She has been off of B12 supplementation for unclear reasons, did not know that she should remain on this.  She has just recently started back on vitamin D supplementation with low level checked by her PCP on 3/31/2025.  She is concerned about her cousin who has been diagnosed with breast cancer and had a number of questions about this today.      Objective   Vital Signs:   /75   Pulse 116   Temp 98 °F (36.7 °C) (Oral)   Resp 17   Ht 161.9 cm (63.74\")   Wt 95.3 kg (210 lb)   SpO2 97%   BMI 36.34 kg/m²     Physical Exam  Constitutional:       Appearance: She is well-developed.   HENT:      Left Ear: Tympanic membrane normal.   Eyes:      Conjunctiva/sclera: Conjunctivae normal.   Neck:      Thyroid: No thyromegaly.   Cardiovascular:      Rate and Rhythm: Normal rate and regular rhythm.      Heart sounds: No murmur heard.     No friction rub. No gallop.   Pulmonary:      Effort: No respiratory distress.      Breath sounds: Normal breath sounds.      Comments: Status post right reduction mammoplasty, no masses or abnormalities palpated in the right breast.  Status post left mastectomy with TRAM reconstruction.  There are some areas of probable fat necrosis palpated in the superior medial aspects of the reconstructed breast that have diminished over time.  No concerning abnormalities identified.  Abdominal:      General: Bowel sounds are normal. There " is no distension.      Palpations: Abdomen is soft.      Tenderness: There is no abdominal tenderness.   Lymphadenopathy:      Head:      Right side of head: No submandibular adenopathy.      Cervical: No cervical adenopathy.      Upper Body:      Right upper body: No supraclavicular adenopathy.      Left upper body: No supraclavicular adenopathy.   Skin:     General: Skin is warm and dry.      Findings: No rash.      Comments: Patient with scattered small erythematous macular areas on the extremities, unchanged   Neurological:      Mental Status: She is alert and oriented to person, place, and time.      Cranial Nerves: No cranial nerve deficit.      Motor: No abnormal muscle tone.      Deep Tendon Reflexes: Reflexes normal.   Psychiatric:         Behavior: Behavior normal.            Result Review : Reviewed CBC, CMP, iron panel, ferritin, B12 level from today.  Reviewed DEXA scan from 1/17/2025       Assessment and Plan    *Stage IIA ([id number]) left breast cancer:     Status post left mastectomy with left axillary lymph node dissection 03/12/2013.  Lesion #1 invasive ductal carcinoma grade 2, 1.5 cm ER positive (90%), WY positive (1-4%), HER-2/jodi FISH negative (ratio 1.4),  Ki-67 score of 8%.  Lesion #2 invasive ductal carcinoma, grade 2, 1.7 cm,  ER positive (70%), WY negative (%),HER-2/jodi FISH positive (ratio of 3.0).  3 of 15 lymph nodes positive (2 macrometastases, 1 micrometastasis, largest lesion 2.5 cm replacing lymph node).  Lymph node evaluated and was ER positive (90%), WY positive (30%), HER-2/jodi FISH negative (ratio of 1.4).   She received adjuvant dose-dense chemotherapy with AC x4 cycles followed by weekly Taxol x12 doses completed on 08/27/2013. She did receive Herceptin in conjunction with the Taxol and then completed a total of a year with last dose of Herceptin received on 05/21/2014 (3-week dose).   She did initiate adjuvant tamoxifen on 09/25/2013 and we anticipate likely a 10-year  course of treatment.   BRCA-1 and 2 testing negative at time of diagnosis.  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  Due to continued menorrhagia she underwent KATE/BSO on 6/5/18 with benign findings.    DEXA scan 10/12/18 with evidence of osteopenia with maximal T score -1.4 the lumbar spine.    Since the patient was rendered surgically postmenopausal with only mild osteopenia, she was transitioned from tamoxifen to adjuvant Arimidex on 11/21/18 with intent to complete 10 years of total adjuvant endocrine therapy (from time of initiation of tamoxifen 9/25/13).  The patient was able to undergo reconstructive therapy in late 2020.  Dr. Montgomery performed a left TRAM reconstruction on 8/4/2020, pathology from specimen was negative for malignancy.  She had difficulty with necrosis and underwent revision procedure on 8/24/2020, again with pathology negative for malignancy but showing evidence of inflammation and fat necrosis.  She underwent subsequent right reduction mammoplasty for symmetry on 11/24/2020.  Pathology from the right breast was negative for malignancy.  Additional plastic surgery procedure 2/25/2021 to excise excess tissue left lateral chest wall/axilla  Patient discontinued adjuvant Arimidex in late September 2023 after completion of 10 years adjuvant endocrine therapy.  Patient returns today for a 6-month interval follow-up visit.  She has remained off of adjuvant Arimidex since late September when she completed 10 years of treatment.  Patient continues today with no clinical evidence of recurrent disease.  Her exam today is negative.  Will schedule annual mammogram in October 2025.  She does request to maintain every 6-month follow-up for now.    *Depression/anxiety with prior psychotic episode:   The patient required hospitalization in the behavioral health unit in October 2015.   She has had difficulties with anxiety and depression in the past, however, given her breast cancer  diagnosis as well as stressors from her job and family, it appears that these precipitated the event.    Patient continues on Lexapro at 20 mg per day.     *Rheumatoid arthritis:   The patient was experiencing diffuse arthralgias as well as pain in her wrists right greater than left.    We did perform an evaluation 4/28/17 with negative MARYSE panel however rheumatoid factor was positive at 16.4.    She was referred to rheumatology Dr. Lopez in May 2017.   The patient was felt to have early autoimmune disorder/rheumatoid arthritis.    She was started on Plaquenil.    Symptoms have stabilized and improved over time.    Patient discontinued Plaquenil on her own sometime in 2021  Patient has not experienced any recurrence of her arthralgias remains off of Plaquenil.  She has not followed up with rheumatology in quite some time.    *Vitamin D deficiency:   The patient has been on and off of vitamin D supplementation in the past.    Borderline low 25 hydroxy vitamin D level on 5/14/2019 at 28.3 and was taking vitamin D 400 units over-the-counter daily, subsequently increased dose to 800 units daily.    On 8/6/2019, 25 hydroxy vitamin D level had improved to 34.8.  Recent labs from 3/31/2025 showed 25-hydroxy vitamin D level of 24.  Patient did resume OTC vitamin D supplementation.    *Anemia with history of iron deficiency and B12 deficiency in the setting of gastric bypass:   The patient developed significant anemia with a hemoglobin down to 10.8 on 3/30/16.    Evaluation revealed iron deficiency with a ferritin of 12.5 iron percent saturation of 5 and a TIBC 535.    She started oral iron sulfate 325 mg twice a day and was tolerating this very well.  Her hemoglobin improved into the 12 range.    Her iron studies showed a significant improvement previously in October 2016 with ferritin up to 54, iron saturation 16%.    She was asked to continue on oral iron twice a day however she did not do so.    The patient also was  found to have B12 deficiency with a level of 187 on 3/30/16.  She started B12 replacement and received weekly dosing ×4 followed bimonthly injections.  As it was difficult for her to come in to the office for B12 injections, she was asked to begin oral B12 1000 µg daily however she did not do this.    On 4/28/17, she was mildly anemic with a hemoglobin of 11.4.  She had a thrombocytosis as well, related to iron deficiency.  Repeat iron studies 4/28/17 showed a ferritin that declined to 42, iron saturation low at 7%, elevated TIBC at 491. She was asked to resume oral iron twice daily.  B12 level 4/28/17 had remained in the low normal range at 370 and was monitored.    On 7/14/17, B12 level was again low normal at 299 if she was asked to resume oral B12 however she did not do so.  She was intolerant of oral iron with diarrhea and abdominal cramping.    Labs on 11/8/17 showed a declining iron saturation 9% and a slight decline in ferritin down to 39 with a TIBC at the 501.  Her hemoglobin was up to 12.8.  Oral iron was discontinued 11/8/17 and she proceeded on with Feraheme on 11/15/17 and 11/22/17.  She also resumed oral B12 daily.    The patient had recurrent menorrhagia but underwent KATE/BSO on 6/5/18.    B12 level on 5/14/2019 was 1053.    On 1/17/2020, hemoglobin declined slightly to 11.7.  We did repeat her iron studies showing ferritin of 116, iron 27, iron saturation 8%, TIBC 325.  Her anemia may be related to recent gastric bypass procedure.   On 1/8/2021, hemoglobin was 12.9 with iron 51, ferritin 37.8, iron saturation 11%, TIBC 469, B12 799.  There had been gradual decline in her ferritin level and with development of low iron saturation.  She has been intolerant of oral iron and would likely not absorb the iron at this point due to her gastric bypass.  Therefore received Injectafer 750 mg IV on 1/15 and 1/22/2021.  Patient does continue on multivitamin that contains small amounts of iron and B12  Patient  initiated B12 1000 mcg sublingual on 9/6/2023  Labs today with hemoglobin that remains normal at 12.9 with iron studies that are stable to improved with iron 59, ferritin 79.8, iron saturation 15%, TIBC 381.  She has undergone previous gastric bypass and we will need to continue monitoring her iron status with potential she may require further IV iron in the future if studies declined significantly.  She does continue on a multivitamin daily that does contain some iron.  Patient had been receiving B12 supplementation however reports that she discontinued this at some point.  B12 level today is normal at 8 at return visit in 6 months 71.  We will recheck at return visit in 6 months.    *Elevated transaminases with evidence of hepatic steatosis:   The patient has had intermittent mild elevation in transaminases.    Viral hepatitis panel -5/14/2019.    Hepatic ultrasound 5/28/2019 with borderline hepatomegaly with probable mild fatty infiltration with increased echotexture.    Patient with normal transaminases.  She had an intermittent borderline elevated alkaline phosphatase however this was more significantly elevated, up to 171 on 4/20/2024 and 156 on 10/23/2024.  Alkaline phosphatase isoenzymes on 10/23/2024 with bone fraction 50%, liver fraction 50%  Labs today with alkaline phosphatase that is decreased to 143.  Transaminases normal.    *Left upper extremity lymphedema:   The patient does wear her sleeve intermittently, is not wearing it today.    Lymphedema is stable to improved.    *Osteopenia:   Previous DEXA scan 3/5/14 osteopenia with maximal T score of -1.1 in the lumbar spine.    Surgically postmenopausal.    Repeat DEXA scan in 10/12/18 with maximal T score -1.4 lumbar spine showing worsening since 2014.    Aromatase inhibitor therapy initiated with Arimidex 1 mg daily on 11/20/18.    One year follow-up DEXA scan 10/18/2019 showed a 5% improvement in lumbar spine with T score 0.1, 23% decrease right  femoral neck with T score -2.1, 18% improvement in left femoral neck with T score -0.3.  With decline in bone density in the right femoral neck approaching osteoporosis, recommended that we pursue treatment at this point as she continues on aromatase inhibitor therapy.  She is not a good candidate for oral Fosamax given her difficulties with gastritis and ulcer as well as gastric bypass.  Therefore initiated Prolia on 10/31/2019.    1 year interval follow-up DEXA scan on 12/31/2020 showed evidence of osteopenia with T score lumbar spine -0.5, left hip -0.6, and right hip -1.5.  The maximal T score in the right hip improved significantly compared to value from 2019 at which time it was -2.1.  There was a slight decline in normal bone density in the lumbar spine and left hip.  DEXA scan 1/16/2023 with stable findings, evidence of osteopenia with T score lumbar spine 0.6, left hip -0.9, right hip -1.3.  Patient referred to endocrinology, was seen on 3/10/2023.  Due to improvement in the patient's bone density was recommended for her to discontinue further Prolia  Most recent DEXA scan on 1/17/2025 showed lumbar spine T-score -1.0, left hip -1.0, right hip -1.6.  Patient will follow up with endocrinology regarding further recommendations for management    *Hypercalcemia secondary to hyperparathyroidism:   The patient had hypercalcemia on 5/14/2019 with calcium 10.8, albumin 4.6.  Intact PTH was suppressed at 12.8.  Suspected to be related to exogenous calcium supplementation.   On 8/10/2022, calcium was 10.4, intact PTH was elevated at 91.6 consistent with hyperparathyroidism as well as exogenous calcium supplementation  Calcium today normal at 8.7    *Obesity:   The patient is status post gastric bypass at [facility] in [place] on 11/13/2019.    The patient lost significant weight down from 296 down to plateau around 200 pounds.  Weight did increase over time however plateaued.  Weight today has declined to 210  robert    *Reported gastritis/gastric ulcer:   EGD performed prior to gastric bypass showed evidence of ulcer and gastritis.    She was treated with Carafate and omeprazole with follow-up EGD on 10/14/2019 that showed improvement but did also show a hiatal hernia.     *Right knee osteoarthritis  Patient with aggravation of her right knee osteoarthritis, was seen by orthopedics on 7/14/2023 and underwent steroid injection with improvement in symptoms.    *Skin rash   Patient with stable scattered erythematous macular areas of erythema involving her extremities, is being followed by dermatology.     PLAN:     Patient continues on a course of observation/surveillance in regards to her history of left breast cancer with no clinical evidence of recurrent disease.  She completed 10 years adjuvant endocrine therapy with anastrozole in late September 2023.  Patient remains off of B12 supplementation currently  Schedule annual right mammogram in October 2025.  MD visit in 6 months with CBC, CMP, B12 level, stat iron panel/ferritin     I did spend 50 minutes total time caring for the patient today, time spent in review of records, face-to-face consultation, coordination of care, placement of orders, completion of documentation.  The patient had a number of questions that were answered to her satisfaction today.

## 2025-05-21 ENCOUNTER — OFFICE VISIT (OUTPATIENT)
Dept: ONCOLOGY | Facility: CLINIC | Age: 50
End: 2025-05-21
Payer: COMMERCIAL

## 2025-05-21 ENCOUNTER — LAB (OUTPATIENT)
Dept: LAB | Facility: HOSPITAL | Age: 50
End: 2025-05-21
Payer: COMMERCIAL

## 2025-05-21 VITALS
RESPIRATION RATE: 17 BRPM | WEIGHT: 210 LBS | TEMPERATURE: 98 F | SYSTOLIC BLOOD PRESSURE: 119 MMHG | BODY MASS INDEX: 35.85 KG/M2 | HEART RATE: 116 BPM | OXYGEN SATURATION: 97 % | DIASTOLIC BLOOD PRESSURE: 75 MMHG | HEIGHT: 64 IN

## 2025-05-21 DIAGNOSIS — C50.812 MALIGNANT NEOPLASM OF OVERLAPPING SITES OF LEFT BREAST IN FEMALE, ESTROGEN RECEPTOR POSITIVE: Primary | ICD-10-CM

## 2025-05-21 DIAGNOSIS — D50.9 IRON DEFICIENCY ANEMIA, UNSPECIFIED IRON DEFICIENCY ANEMIA TYPE: ICD-10-CM

## 2025-05-21 DIAGNOSIS — D51.8 OTHER VITAMIN B12 DEFICIENCY ANEMIA: ICD-10-CM

## 2025-05-21 DIAGNOSIS — C50.812 MALIGNANT NEOPLASM OF OVERLAPPING SITES OF LEFT BREAST IN FEMALE, ESTROGEN RECEPTOR POSITIVE: ICD-10-CM

## 2025-05-21 DIAGNOSIS — Z17.0 MALIGNANT NEOPLASM OF OVERLAPPING SITES OF LEFT BREAST IN FEMALE, ESTROGEN RECEPTOR POSITIVE: ICD-10-CM

## 2025-05-21 DIAGNOSIS — Z17.0 MALIGNANT NEOPLASM OF OVERLAPPING SITES OF LEFT BREAST IN FEMALE, ESTROGEN RECEPTOR POSITIVE: Primary | ICD-10-CM

## 2025-05-21 LAB
ALBUMIN SERPL-MCNC: 4.3 G/DL (ref 3.5–5.2)
ALBUMIN/GLOB SERPL: 1.8 G/DL
ALP SERPL-CCNC: 143 U/L (ref 39–117)
ALT SERPL W P-5'-P-CCNC: 18 U/L (ref 1–33)
ANION GAP SERPL CALCULATED.3IONS-SCNC: 15.7 MMOL/L (ref 5–15)
AST SERPL-CCNC: 20 U/L (ref 1–32)
BASOPHILS # BLD AUTO: 0.06 10*3/MM3 (ref 0–0.2)
BASOPHILS NFR BLD AUTO: 0.7 % (ref 0–1.5)
BILIRUB SERPL-MCNC: <0.2 MG/DL (ref 0–1.2)
BUN SERPL-MCNC: 14 MG/DL (ref 6–20)
BUN/CREAT SERPL: 20.3 (ref 7–25)
CALCIUM SPEC-SCNC: 8.7 MG/DL (ref 8.6–10.5)
CHLORIDE SERPL-SCNC: 107 MMOL/L (ref 98–107)
CO2 SERPL-SCNC: 19.3 MMOL/L (ref 22–29)
CREAT SERPL-MCNC: 0.69 MG/DL (ref 0.57–1)
DEPRECATED RDW RBC AUTO: 43.7 FL (ref 37–54)
EGFRCR SERPLBLD CKD-EPI 2021: 105.2 ML/MIN/1.73
EOSINOPHIL # BLD AUTO: 0.26 10*3/MM3 (ref 0–0.4)
EOSINOPHIL NFR BLD AUTO: 2.8 % (ref 0.3–6.2)
ERYTHROCYTE [DISTWIDTH] IN BLOOD BY AUTOMATED COUNT: 13 % (ref 12.3–15.4)
FERRITIN SERPL-MCNC: 79.8 NG/ML (ref 13–150)
GLOBULIN UR ELPH-MCNC: 2.4 GM/DL
GLUCOSE SERPL-MCNC: 187 MG/DL (ref 65–99)
HCT VFR BLD AUTO: 40 % (ref 34–46.6)
HGB BLD-MCNC: 12.9 G/DL (ref 12–15.9)
IMM GRANULOCYTES # BLD AUTO: 0.04 10*3/MM3 (ref 0–0.05)
IMM GRANULOCYTES NFR BLD AUTO: 0.4 % (ref 0–0.5)
IRON 24H UR-MRATE: 59 MCG/DL (ref 37–145)
IRON SATN MFR SERPL: 15 % (ref 20–50)
LYMPHOCYTES # BLD AUTO: 2.54 10*3/MM3 (ref 0.7–3.1)
LYMPHOCYTES NFR BLD AUTO: 27.8 % (ref 19.6–45.3)
MCH RBC QN AUTO: 29.9 PG (ref 26.6–33)
MCHC RBC AUTO-ENTMCNC: 32.3 G/DL (ref 31.5–35.7)
MCV RBC AUTO: 92.6 FL (ref 79–97)
MONOCYTES # BLD AUTO: 0.43 10*3/MM3 (ref 0.1–0.9)
MONOCYTES NFR BLD AUTO: 4.7 % (ref 5–12)
NEUTROPHILS NFR BLD AUTO: 5.81 10*3/MM3 (ref 1.7–7)
NEUTROPHILS NFR BLD AUTO: 63.6 % (ref 42.7–76)
NRBC BLD AUTO-RTO: 0 /100 WBC (ref 0–0.2)
PLATELET # BLD AUTO: 330 10*3/MM3 (ref 140–450)
PMV BLD AUTO: 10.5 FL (ref 6–12)
POTASSIUM SERPL-SCNC: 3.7 MMOL/L (ref 3.5–5.2)
PROT SERPL-MCNC: 6.7 G/DL (ref 6–8.5)
RBC # BLD AUTO: 4.32 10*6/MM3 (ref 3.77–5.28)
SODIUM SERPL-SCNC: 142 MMOL/L (ref 136–145)
TIBC SERPL-MCNC: 381 MCG/DL (ref 298–536)
TRANSFERRIN SERPL-MCNC: 256 MG/DL (ref 200–360)
VIT B12 BLD-MCNC: 871 PG/ML (ref 211–946)
WBC NRBC COR # BLD AUTO: 9.14 10*3/MM3 (ref 3.4–10.8)

## 2025-05-21 PROCEDURE — 82607 VITAMIN B-12: CPT | Performed by: INTERNAL MEDICINE

## 2025-05-21 PROCEDURE — 80053 COMPREHEN METABOLIC PANEL: CPT

## 2025-05-21 PROCEDURE — 36415 COLL VENOUS BLD VENIPUNCTURE: CPT

## 2025-05-21 PROCEDURE — 85025 COMPLETE CBC W/AUTO DIFF WBC: CPT

## 2025-05-21 PROCEDURE — 84466 ASSAY OF TRANSFERRIN: CPT

## 2025-05-21 PROCEDURE — 83540 ASSAY OF IRON: CPT

## 2025-05-21 PROCEDURE — 82728 ASSAY OF FERRITIN: CPT

## 2025-06-02 DIAGNOSIS — N30.01 ACUTE CYSTITIS WITH HEMATURIA: ICD-10-CM

## 2025-06-02 RX ORDER — NITROFURANTOIN 25; 75 MG/1; MG/1
100 CAPSULE ORAL 2 TIMES DAILY
Qty: 10 CAPSULE | Refills: 0 | Status: SHIPPED | OUTPATIENT
Start: 2025-06-02 | End: 2025-06-07

## 2025-07-25 ENCOUNTER — OFFICE VISIT (OUTPATIENT)
Dept: ENDOCRINOLOGY | Age: 50
End: 2025-07-25
Payer: COMMERCIAL

## 2025-07-25 VITALS
OXYGEN SATURATION: 97 % | HEIGHT: 64 IN | WEIGHT: 228.2 LBS | HEART RATE: 111 BPM | SYSTOLIC BLOOD PRESSURE: 132 MMHG | DIASTOLIC BLOOD PRESSURE: 80 MMHG | BODY MASS INDEX: 38.96 KG/M2

## 2025-07-25 DIAGNOSIS — M85.80 OSTEOPENIA, UNSPECIFIED LOCATION: Primary | ICD-10-CM

## 2025-07-25 PROCEDURE — 99213 OFFICE O/P EST LOW 20 MIN: CPT | Performed by: INTERNAL MEDICINE

## 2025-07-25 NOTE — PROGRESS NOTES
Chief complaint/Reason for consult: osteopenia    HPI:   - 50 year old female here for osteopenia  - Last seen in 7/2024  - No changes since last visit  - Prior fragility fractures: none but she did have a traumatic fracture of her tibia over 20 years  - Prior/current treatments for osteoporosis:  Prolia for 2 years with last injection in 2/2023  - Risk factors for osteoporosis/bone loss:  Breast cancer on Arimidex  - Menopausal status:  10 years ago  - Family history of osteoporosis:  mother  - Vitamin D and calcium intake:  Vitamin D and calcium  - Physical activity:  Minimal due to ankle problems  - Last DXA:  1/2025 showing a most severe T-score of -1.6 in the right femoral neck    The following portions of the patient's history were reviewed and updated as appropriate: allergies, current medications, past family history, past medical history, past social history, past surgical history, and problem list.      Objective     Vitals:    07/25/25 1353   BP: 132/80   Pulse: 111   SpO2: 97%        Physical Exam  Vitals reviewed.   Constitutional:       Appearance: Normal appearance.   HENT:      Head: Normocephalic and atraumatic.   Eyes:      General: No scleral icterus.  Pulmonary:      Effort: Pulmonary effort is normal. No respiratory distress.   Neurological:      Mental Status: She is alert.      Gait: Gait normal.   Psychiatric:         Mood and Affect: Mood normal.         Behavior: Behavior normal.         Thought Content: Thought content normal.         Judgment: Judgment normal.         Assessment & Plan   Osteopenia  - Her FRAX is below the threshold for treatment so would not recommend treatment at this time  - Recommend 1000 IU vitamin D3 daily, 1200 mg of dietary/supplementary calcium, weight bearing exercise as tolerated  - Ordered DXA for 1/2027     - Return to clinic in 2 years

## (undated) DEVICE — INTENDED FOR TISSUE SEPARATION, AND OTHER PROCEDURES THAT REQUIRE A SHARP SURGICAL BLADE TO PUNCTURE OR CUT.: Brand: BARD-PARKER ® CARBON RIB-BACK BLADES

## (undated) DEVICE — STPLR SKIN VISISTAT WD 35CT

## (undated) DEVICE — SUT VIC 0 CT1 36IN J946H

## (undated) DEVICE — SOL ISO/ALC RUB 70PCT 4OZ

## (undated) DEVICE — DRN WND AXIOM W CLOT STOP 10F

## (undated) DEVICE — JACKSON-PRATT 100CC BULB RESERVOIR: Brand: CARDINAL HEALTH

## (undated) DEVICE — TUBING, SUCTION, 1/4" X 10', STRAIGHT: Brand: MEDLINE

## (undated) DEVICE — APPL CHLORAPREP HI/LITE 26ML ORNG

## (undated) DEVICE — GOWN,NON-REINFORCED,SIRUS,SET IN SLV,XL: Brand: MEDLINE

## (undated) DEVICE — ADHS SKIN DERMABOND TOP ADVANCED

## (undated) DEVICE — SUT ETHLN 3/0 PS1 18IN 1663H

## (undated) DEVICE — CONTAINER,SPECIMEN,OR STERILE,4OZ: Brand: MEDLINE

## (undated) DEVICE — PAD,ABDOMINAL,8"X10",ST,LF: Brand: MEDLINE

## (undated) DEVICE — GOWN ,SIRUS,NONREINFORCED SMALL: Brand: MEDLINE

## (undated) DEVICE — BANDAGE,GAUZE,BULKEE II,4.5"X4.1YD,STRL: Brand: MEDLINE

## (undated) DEVICE — DRSNG GZ PETROLTM XEROFORM CURAD 1X8IN STRL

## (undated) DEVICE — SPNG LAP 18X18IN LF STRL PK/5

## (undated) DEVICE — PK CHST BRST 40

## (undated) DEVICE — GLV SURG BIOGEL LTX PF 6 1/2

## (undated) DEVICE — LOU LAVH: Brand: MEDLINE INDUSTRIES, INC.

## (undated) DEVICE — IRRIGATOR BULB ASEPTO 60CC STRL

## (undated) DEVICE — SUT VIC 5/0 PS2 18IN J495H

## (undated) DEVICE — MEDICINE CUP, GRADUATED, STER: Brand: MEDLINE

## (undated) DEVICE — PK UNIV COMPL 40

## (undated) DEVICE — ADHS LIQ MASTISOL 2/3ML

## (undated) DEVICE — DRSNG SURESITE WNDW 4X4.5

## (undated) DEVICE — UNDYED MONOFILAMENT (POLYDIOXANONE), ABSORBABLE SYNTHETIC SURGICAL SUTURE: Brand: PDS

## (undated) DEVICE — ENDOPATH PNEUMONEEDLE INSUFFLATION NEEDLES WITH LUER LOCK CONNECTORS 120MM: Brand: ENDOPATH

## (undated) DEVICE — KT ORCA ORCAPOD DISP STRL

## (undated) DEVICE — GLV SURG PREMIERPRO ORTHO LTX PF SZ8.5 BRN

## (undated) DEVICE — BIFURCATED DRAIN EXTENSION FOR CLOSED WOUND DRAIN: Brand: AXIOM®

## (undated) DEVICE — CATH FOL SIMPLYLATEX SILELAST 16F 17IN

## (undated) DEVICE — ANTIBACTERIAL UNDYED BRAIDED (POLYGLACTIN 910), SYNTHETIC ABSORBABLE SUTURE: Brand: COATED VICRYL

## (undated) DEVICE — ELECTRD BLD EDGE/INSUL1P 2.4X5.1MM STRL

## (undated) DEVICE — SPNG GZ WOVN 4X4IN 12PLY 10/BX STRL

## (undated) DEVICE — PENCL E/S HNDSWTCH SMOKEEVAC HOLSTR 10FT

## (undated) DEVICE — 3M™ STERI-STRIP™ REINFORCED ADHESIVE SKIN CLOSURES, R1546, 1/4 IN X 4 IN (6 MM X 100 MM), 10 STRIPS/ENVELOPE: Brand: 3M™ STERI-STRIP™

## (undated) DEVICE — LAPAROSCOPIC GAS CONDITIONING DEVICE.: Brand: INSUFLOW

## (undated) DEVICE — ELECTRD BLD EZ CLN MOD XLNG 2.75IN

## (undated) DEVICE — TOTAL TRAY, 16FR 10ML SIL FOLEY, URN: Brand: MEDLINE

## (undated) DEVICE — Device

## (undated) DEVICE — LAPAROSCOPIC SMOKE ELIMINATION DEVICE: Brand: PNEUVIEW XE

## (undated) DEVICE — 1010 S-DRAPE TOWEL DRAPE 10/BX: Brand: STERI-DRAPE™

## (undated) DEVICE — DRSNG SURESITE123 4X10IN

## (undated) DEVICE — SENSR O2 OXIMAX FNGR A/ 18IN NONSTR

## (undated) DEVICE — Device: Brand: FABCO

## (undated) DEVICE — SOL NACL 0.9PCT 1000ML

## (undated) DEVICE — CATH FOL SIMPLYLATEX SILELAST 18F 17IN

## (undated) DEVICE — TOWEL,OR,DSP,ST,BLUE,STD,4/PK,20PK/CS: Brand: MEDLINE

## (undated) DEVICE — SUT ETHLN 0 LP XLH 72IN D5854

## (undated) DEVICE — CANN O2 ETCO2 FITS ALL CONN CO2 SMPL A/ 7IN DISP LF

## (undated) DEVICE — SUT VIC 3/0 CTI 36IN J944H

## (undated) DEVICE — ST IRR CYSTO W/SPK 77IN LF

## (undated) DEVICE — KTTNER ENDO BLNT DISSCT

## (undated) DEVICE — SUT MNCRYL 2/0 CT1 36IN UD MCP945H

## (undated) DEVICE — PANTY KNIT WASHABLE LG/XL BRN/GRN LF

## (undated) DEVICE — ENDOPATH XCEL WITH OPTIVIEW TECHNOLOGY BLADELESS TROCARS WITH STABILITY SLEEVES: Brand: ENDOPATH XCEL OPTIVIEW

## (undated) DEVICE — MARKR SKIN W/RULR AND LBL

## (undated) DEVICE — SUT PROLN 4/0 P3 18IN 8699G

## (undated) DEVICE — 3M™ STERI-STRIP™ ANTIMICROBIAL SKIN CLOSURES 1/2 INCH X 4 INCHES 50/CARTON 4 CARTONS/CASE A1847: Brand: 3M™ STERI-STRIP™

## (undated) DEVICE — SYS ENSING FLUID M/ ADD MAC1001

## (undated) DEVICE — NEEDLE, QUINCKE, 20GX3.5": Brand: MEDLINE

## (undated) DEVICE — ADAPT CLN BIOGUARD AIR/H2O DISP

## (undated) DEVICE — UNDYED BRAIDED (POLYGLACTIN 910), SYNTHETIC ABSORBABLE SUTURE: Brand: COATED VICRYL

## (undated) DEVICE — BIOPATCH™ ANTIMICROBIAL DRESSING WITH CHLORHEXIDINE GLUCONATE IS A HYDROPHILLIC POLYURETHANE ABSORPTIVE FOAM WITH CHLORHEXIDINE GLUCONATE (CHG) WHICH INHIBITS BACTERIAL GROWTH UNDER THE DRESSING. THE DRESSING IS INTENDED TO BE USED TO ABSORB EXUDATE, COVER A WOUND CAUSED BY VASCULAR AND NONVASCULAR PERCUTANEOUS MEDICAL DEVICES DURING SURGERY, AS WELL AS REDUCE LOCAL INFECTION AND COLONIZATION OF MICROORGANISMS.: Brand: BIOPATCH

## (undated) DEVICE — 3M™ STERI-STRIP™ REINFORCED ADHESIVE SKIN CLOSURES, R1547, 1/2 IN X 4 IN (12 MM X 100 MM), 6 STRIPS/ENVELOPE: Brand: 3M™ STERI-STRIP™

## (undated) DEVICE — HARMONIC ACE +7 LAPAROSCOPIC SHEARS ADVANCED HEMOSTASIS 5MM DIAMETER 36CM SHAFT LENGTH  FOR USE WITH GRAY HAND PIECE ONLY: Brand: HARMONIC ACE

## (undated) DEVICE — SUT MNCRYL 3/0 PS2 18IN MCP497G

## (undated) DEVICE — BNDR ABD 4PANEL 12IN 46 TO 62IN

## (undated) DEVICE — SUT MNCRYL 3/0 PS2 18IN Y497G

## (undated) DEVICE — PAD SANI MAXI W/ADHS SNG WRP 11IN

## (undated) DEVICE — DRP SLUSH WARMR MACH CIR 44X44IN